# Patient Record
Sex: FEMALE | Race: OTHER | NOT HISPANIC OR LATINO | Employment: OTHER | URBAN - METROPOLITAN AREA
[De-identification: names, ages, dates, MRNs, and addresses within clinical notes are randomized per-mention and may not be internally consistent; named-entity substitution may affect disease eponyms.]

---

## 2017-01-05 ENCOUNTER — CLINICAL SUPPORT (OUTPATIENT)
Dept: REHABILITATION | Facility: HOSPITAL | Age: 67
End: 2017-01-05
Attending: ORTHOPAEDIC SURGERY
Payer: MEDICARE

## 2017-01-05 DIAGNOSIS — M54.2 NECK PAIN ON LEFT SIDE: ICD-10-CM

## 2017-01-05 PROCEDURE — 97112 NEUROMUSCULAR REEDUCATION: CPT | Mod: PO

## 2017-01-05 PROCEDURE — 97140 MANUAL THERAPY 1/> REGIONS: CPT | Mod: PO

## 2017-01-05 NOTE — PROGRESS NOTES
Name: Reuben Almanza  Clinic Number: [id number]  01/05/2017.     Diagnosis: neck pain   Physician: Keaton Harris MD  Treatment Orders: PT Eval and Treat    Past Medical History   Diagnosis Date    Multiple thyroid nodules     Osteoporosis     Reflux     Senile nuclear sclerosis - Both Eyes 10/1/2013     Current Outpatient Prescriptions   Medication Sig    alendronate (FOSAMAX) 70 MG tablet TAKE 1 TABLET BY MOUTH EVERY 7 DAYS    multivitamin (THERAGRAN) per tablet Take 1 tablet by mouth Daily.    ranitidine (ZANTAC) 150 MG tablet Bid prn     No current facility-administered medications for this visit.      Review of patient's allergies indicates:   Allergen Reactions    Ciprofloxacin      palpitations    Flagyl [metronidazole hcl]      Cause urinary frequency     Precautions: standard      Subjective:    Previous PT: none  Work history: clerical, mostly sitting, working at a computer, full time   Recreational activities/exercise program: gardening, walking 30'/day     Reuben reports doing better but has sxs with neck movements.     Pain is rated on a 0-10 scale with 0 being no pain and 10 being pain requiring emergency room visit.    Diagnostic Tests: Radiographs    Patient Goals for PT: reduce pain, get understanding of dx.       Objective:      Visit # 6  Time In: 1400  Time Out: 1445  Treatment time: 30  Date of eval/re-eval: 12/2/2016       Upper c/s rotation deficits R/L       [1] Manual therapy x  15'   C/s distraction   L uni PA with rotation       [1] Neuromuscular re-ed x 15'  Self L uni PA with rotation     Wall slides with reach/serratus activation 3x8          Written HEP Provided: no  Patient education: HEP; avoid sway back and scap DR with ADLs  Reuben demo good understanding of the education provided. Patient demo good return demo of skill of exercises.    Problem List: muscle length impairments, TP, pain, decreased c/s and UE ROM.       Assessment:    Physical therapy diagnosis: scapular  insufficient UR, c/s rotation syndrome  Rehab potential: good  Reuben tolerated tx without increase in sxs. Lower c/s rotation deficts noted. Able to improve with scap repositioning and c/s manual therapy to fix closing restriction on    Reuben will benefit from skilled PT services to address these impairments and progress towards the below listed functional goals.  Reuben is in agreement with the goals that will be addressed in the treatment plan.Reuben demonstrates no additional cultural, spiritual or educational needs and currently has no barriers to learning.      Medical necessity: see Problem List      Functional Goals  Time frame     1.   The pt will demonstrate full c/s rotation without increase in sxs for improved functional mobility.   4-6 weeks     2.   The pt will demonstrate full t/s rotation for improved functional mobility.   4-6 weeks     3.   The pt will neutral alignment of b scapular in normal standing.   4-6 weeks     4.   The pt will be independent in performance and progression of HEP.     2-3 visits            Plan:      Proceed/Continue with POC.     Pt will be treated by physical therapy 1-2x/week during the certification period. Treatment will consist of therapeutic exercise, manual therapy, neuromuscular re-education, heat and cold modalities, electrical stimulation for pain relief and/or muscle activation, and any other types of treatment hat may be deemed medically necessary. Reuben may at times be seen by a PTA as part of the Rehab Team.       Physical Therapist: ZANE Keys, PT, DPT, CSCS    I certify the need for these services furnished under this plan of treatment and while under my care.       ___________________________________  Physician/Referring Practitioner        _________________  Date of Signature

## 2017-01-12 ENCOUNTER — CLINICAL SUPPORT (OUTPATIENT)
Dept: REHABILITATION | Facility: HOSPITAL | Age: 67
End: 2017-01-12
Attending: ORTHOPAEDIC SURGERY
Payer: MEDICARE

## 2017-01-12 DIAGNOSIS — M54.2 NECK PAIN ON LEFT SIDE: ICD-10-CM

## 2017-01-12 PROCEDURE — 97112 NEUROMUSCULAR REEDUCATION: CPT | Mod: PO

## 2017-01-12 PROCEDURE — G8978 MOBILITY CURRENT STATUS: HCPCS | Mod: CJ,PO

## 2017-01-12 PROCEDURE — G8979 MOBILITY GOAL STATUS: HCPCS | Mod: CJ,PO

## 2017-01-12 PROCEDURE — 97140 MANUAL THERAPY 1/> REGIONS: CPT | Mod: PO

## 2017-01-12 NOTE — PROGRESS NOTES
Name: Reuben Almanza  Clinic Number: [id number]  01/12/2017.     Diagnosis: neck pain   Physician: Keaton Harris MD  Treatment Orders: PT Eval and Treat    Past Medical History   Diagnosis Date    Multiple thyroid nodules     Osteoporosis     Reflux     Senile nuclear sclerosis - Both Eyes 10/1/2013     Current Outpatient Prescriptions   Medication Sig    alendronate (FOSAMAX) 70 MG tablet TAKE 1 TABLET BY MOUTH EVERY 7 DAYS    multivitamin (THERAGRAN) per tablet Take 1 tablet by mouth Daily.    ranitidine (ZANTAC) 150 MG tablet Bid prn     No current facility-administered medications for this visit.      Review of patient's allergies indicates:   Allergen Reactions    Ciprofloxacin      palpitations    Flagyl [metronidazole hcl]      Cause urinary frequency     Precautions: standard      Subjective:    Previous PT: none  Work history: clerical, mostly sitting, working at a computer, full time   Recreational activities/exercise program: gardening, walking 30'/day     Reuebn rates self at 90%. All pain sxs have disappears, only complaints are stiffness on L side with R rotation.     Pain is rated on a 0-10 scale with 0 being no pain and 10 being pain requiring emergency room visit.    Diagnostic Tests: Radiographs    Patient Goals for PT: reduce pain, get understanding of dx.       Objective:      Visit # 7  Time In: 1400  Time Out: 1445  Treatment time: 45  Date of eval/re-eval: 1/12/2017       AROM rotation R/L 50E / 60E; post tx R rot 60E  R closing restrictions upper and lower c/s       [2] Manual therapy x  30'  C/s distraction   Suboccipital release   Contract/relax R rotation upper c/s   Capital flexion/distraction   R upper c/s down glides      [1] Neuromuscular re-ed x 15'  Supine chin tucks on exhale 15x   Standing W's with chin tucks multiple sets of 5 with mirror for feedback to decrease R SCM activation     Wall slides with reach/serratus activation 3x8          Written HEP Provided: no  Patient  education: HEP; stop self snag; standing W's with chin tucks 2-3x/day, 10-15 reps  Reuben demo good understanding of the education provided. Patient demo good return demo of skill of exercises.    Problem List: muscle length impairments, TP, pain, decreased c/s and UE ROM.       Assessment:    Physical therapy diagnosis: scapular insufficient UR, c/s rotation syndrome  Rehab potential: good  Reuben tolerated tx without increase in sxs. Doing well overall. Improved c/s rotation with manual therapy techniques. Restricts seem to be reducing, just needs c/s and scap strengthening for improved postural control.  has Reuben will benefit from skilled PT services to address these impairments and progress towards the below listed functional goals.  Reuben is in agreement with the goals that will be addressed in the treatment plan.Reuben demonstrates no additional cultural, spiritual or educational needs and currently has no barriers to learning.      Medical necessity: see Problem List      Functional Goals  Time frame     1.   The pt will demonstrate full c/s rotation without increase in sxs for improved functional mobility. Ongoing; dec pain, stiffness present.   4-6 weeks     2.   The pt will demonstrate full t/s rotation for improved functional mobility. met  4-6 weeks     3.   The pt will neutral alignment of b scapular in normal standing. Deleted   4-6 weeks     4.   The pt will be independent in performance and progression of HEP. Ongoing - new exercise given    2-3 visits            Plan:      Proceed/Continue with POC.     Pt will be treated by physical therapy 1-2x/week during the certification period. Treatment will consist of therapeutic exercise, manual therapy, neuromuscular re-education, heat and cold modalities, electrical stimulation for pain relief and/or muscle activation, and any other types of treatment hat may be deemed medically necessary. Reuben may at times be seen by a PTA as part of the Rehab Team.        Physical Therapist: ZANE Keys, PT, DPT, CSCS    I certify the need for these services furnished under this plan of treatment and while under my care.       ___________________________________  Physician/Referring Practitioner        _________________  Date of Signature

## 2017-01-19 ENCOUNTER — CLINICAL SUPPORT (OUTPATIENT)
Dept: REHABILITATION | Facility: HOSPITAL | Age: 67
End: 2017-01-19
Attending: ORTHOPAEDIC SURGERY
Payer: MEDICARE

## 2017-01-19 DIAGNOSIS — M54.2 NECK PAIN ON LEFT SIDE: ICD-10-CM

## 2017-01-19 PROCEDURE — G8980 MOBILITY D/C STATUS: HCPCS | Mod: CJ,PO

## 2017-01-19 PROCEDURE — 97110 THERAPEUTIC EXERCISES: CPT | Mod: PO

## 2017-01-19 PROCEDURE — G8979 MOBILITY GOAL STATUS: HCPCS | Mod: CJ,PO

## 2017-01-19 PROCEDURE — 97140 MANUAL THERAPY 1/> REGIONS: CPT | Mod: PO

## 2017-01-19 NOTE — PROGRESS NOTES
Name: Reuben Almanza  Clinic Number: [id number]  01/19/2017.     Diagnosis: neck pain   Physician: Keaton Harris MD  Treatment Orders: PT Eval and Treat    Past Medical History   Diagnosis Date    Multiple thyroid nodules     Osteoporosis     Reflux     Senile nuclear sclerosis - Both Eyes 10/1/2013     Current Outpatient Prescriptions   Medication Sig    alendronate (FOSAMAX) 70 MG tablet TAKE 1 TABLET BY MOUTH EVERY 7 DAYS    multivitamin (THERAGRAN) per tablet Take 1 tablet by mouth Daily.    ranitidine (ZANTAC) 150 MG tablet Bid prn     No current facility-administered medications for this visit.      Review of patient's allergies indicates:   Allergen Reactions    Ciprofloxacin      palpitations    Flagyl [metronidazole hcl]      Cause urinary frequency     Precautions: standard      Subjective:    Previous PT: none  Work history: clerical, mostly sitting, working at a computer, full time   Recreational activities/exercise program: gardening, walking 30'/day     Reuben rates self at 90%. All pain sxs have disappears, only complaints are stiffness on L side with R rotation.     Pain is rated on a 0-10 scale with 0 being no pain and 10 being pain requiring emergency room visit.    Diagnostic Tests: Radiographs    Patient Goals for PT: reduce pain, get understanding of dx.       Objective:      Visit # 7  Time In: 1400  Time Out: 1445  Treatment time: 45  Date of eval/re-eval: 1/12/2017       AROM rotation R/L 50E / 60E; post tx R rot 60E  R closing restrictions upper and lower c/s       [1] Manual therapy x  15'  C/s distraction   Upper c/s flexion 4 x 6   Upper c/s flexion hold with R SB     [1] Neuromuscular re-ed x 15'  Supine chin tucks on exhale 10x  Seated chin tucks with R sidebend 10x      Wall slides with reach/serratus activation 3x8          Written HEP Provided: no  Patient education: HEP; stop self snag; standing W's with chin tucks 2-3x/day, 10-15 reps  Reuben torres good understanding of the  education provided. Patient demo good return demo of skill of exercises.    Problem List: muscle length impairments, TP, pain, decreased c/s and UE ROM.       Assessment:    Physical therapy diagnosis: scapular insufficient UR, c/s rotation syndrome  Rehab potential: good  Reuben tolerated tx without increase in sxs. Continue to work on L side opening restriction. Gave HEP to work on for 2 weeks.Has met functional goals.   Reuben will benefit from skilled PT services to address these impairments and progress towards the below listed functional goals.  Reuben is in agreement with the goals that will be addressed in the treatment plan.Reuben demonstrates no additional cultural, spiritual or educational needs and currently has no barriers to learning.      Medical necessity: see Problem List      Functional Goals  Time frame     1.   The pt will demonstrate full c/s rotation without increase in sxs for improved functional mobility. MET; dec pain, stiffness present.   4-6 weeks     2.   The pt will demonstrate full t/s rotation for improved functional mobility. met  4-6 weeks     3.   The pt will neutral alignment of b scapular in normal standing. Deleted   4-6 weeks     4.   The pt will be independent in performance and progression of HEP. Ongoing - new exercise given    2-3 visits            Plan:      DC; f/u in 2 weeks if necessary.     Pt will be treated by physical therapy 1-2x/week during the certification period. Treatment will consist of therapeutic exercise, manual therapy, neuromuscular re-education, heat and cold modalities, electrical stimulation for pain relief and/or muscle activation, and any other types of treatment hat may be deemed medically necessary. Reuben may at times be seen by a PTA as part of the Rehab Team.       Physical Therapist: ZANE Keys, PT, DPT, CSCS    I certify the need for these services furnished under this plan of treatment and while under my care.        ___________________________________  Physician/Referring Practitioner        _________________  Date of Signature

## 2017-01-20 ENCOUNTER — TELEPHONE (OUTPATIENT)
Dept: ORTHOPEDICS | Facility: CLINIC | Age: 67
End: 2017-01-20

## 2017-01-20 DIAGNOSIS — R52 PAIN: Primary | ICD-10-CM

## 2017-01-25 ENCOUNTER — TELEPHONE (OUTPATIENT)
Dept: ORTHOPEDICS | Facility: CLINIC | Age: 67
End: 2017-01-25

## 2017-01-26 ENCOUNTER — OFFICE VISIT (OUTPATIENT)
Dept: ORTHOPEDICS | Facility: CLINIC | Age: 67
End: 2017-01-26
Payer: MEDICARE

## 2017-01-26 ENCOUNTER — HOSPITAL ENCOUNTER (OUTPATIENT)
Dept: RADIOLOGY | Facility: OTHER | Age: 67
Discharge: HOME | End: 2017-01-26
Attending: ORTHOPAEDIC SURGERY
Payer: MEDICARE

## 2017-01-26 VITALS
HEIGHT: 64 IN | DIASTOLIC BLOOD PRESSURE: 68 MMHG | HEART RATE: 72 BPM | SYSTOLIC BLOOD PRESSURE: 104 MMHG | WEIGHT: 101.44 LBS | BODY MASS INDEX: 17.32 KG/M2 | RESPIRATION RATE: 18 BRPM

## 2017-01-26 DIAGNOSIS — R52 PAIN: ICD-10-CM

## 2017-01-26 DIAGNOSIS — M79.643 PAIN OF HAND, UNSPECIFIED LATERALITY: Primary | ICD-10-CM

## 2017-01-26 DIAGNOSIS — R52 PAIN: Primary | ICD-10-CM

## 2017-01-26 PROCEDURE — 99999 PR PBB SHADOW E&M-EST. PATIENT-LVL III: CPT | Mod: PBBFAC,,, | Performed by: ORTHOPAEDIC SURGERY

## 2017-01-26 PROCEDURE — 1159F MED LIST DOCD IN RCRD: CPT | Mod: S$GLB,,, | Performed by: ORTHOPAEDIC SURGERY

## 2017-01-26 PROCEDURE — 1126F AMNT PAIN NOTED NONE PRSNT: CPT | Mod: S$GLB,,, | Performed by: ORTHOPAEDIC SURGERY

## 2017-01-26 PROCEDURE — 99204 OFFICE O/P NEW MOD 45 MIN: CPT | Mod: S$GLB,,, | Performed by: ORTHOPAEDIC SURGERY

## 2017-01-26 PROCEDURE — 1157F ADVNC CARE PLAN IN RCRD: CPT | Mod: S$GLB,,, | Performed by: ORTHOPAEDIC SURGERY

## 2017-01-26 PROCEDURE — 73130 X-RAY EXAM OF HAND: CPT | Mod: 26,LT,, | Performed by: RADIOLOGY

## 2017-01-26 PROCEDURE — 1160F RVW MEDS BY RX/DR IN RCRD: CPT | Mod: S$GLB,,, | Performed by: ORTHOPAEDIC SURGERY

## 2017-01-26 NOTE — PROGRESS NOTES
Chief Complaint: left hand    History of Present Illness:  Marshall Almanza is a very pleasant 66 y.o. female who presents with left thumb pain and triggering x several; months.  She had a left thumb trigger finger injection in 10/2016 with good relief of her pain but not of her triggering sxs.  She also reports a 1 week hx of index finger numbness, worse at night, relieved by topical icy-hot gel.  REports minimal but occasional right hand ulnar 2 digits numbness, no weakness.    Review of Systems:    Noncontributory except for above      Past Medical History   Diagnosis Date    Multiple thyroid nodules     Osteoporosis     Reflux     Senile nuclear sclerosis - Both Eyes 10/1/2013       Past Surgical History:   No past surgical history on file.    Social History:  negative tobacco abuse    Allergies:  Review of patient's allergies indicates:   Allergen Reactions    Ciprofloxacin      palpitations    Flagyl [metronidazole hcl]      Cause urinary frequency       Medications:  Current Outpatient Prescriptions   Medication Sig Dispense Refill    alendronate (FOSAMAX) 70 MG tablet TAKE 1 TABLET BY MOUTH EVERY 7 DAYS 12 tablet 3    multivitamin (THERAGRAN) per tablet Take 1 tablet by mouth Daily.      ranitidine (ZANTAC) 150 MG tablet Bid prn 180 tablet 3     No current facility-administered medications for this visit.        Physical Exam:   General:  Well developed and well nourished age appropriate female in no acute distress  Cardiovascular: regular rate and rhythm, 2+ radial pulse BL  Respiratory:  no wheezing    Musculoskeletal: + ttp over thumb A1, painful passive ROM  + phalen's compression at wrist, - tinnel's  Median, AIN, Radial, PIN and ulnar intact with 5/5 motor BL  - tinnel's left  - tinnel's at left cubital tunnel   SILT BL hands throughout  Neuro: as above    Imaging:  Imaging revealed: no fracture, no dislocation    Diagnosis:  66 year old woman with LEFT  trigger thumb s/p 1 injection with no  relief also with signs and sxs of LEFT CTS.  Adamantly does not want surgery at this time.    Plan:   1. EMG BL UE  2. OT   3. F/u after for possible CTR and left trigger thumb release

## 2017-01-26 NOTE — LETTER
January 30, 2017      Michelle Bravo MD  [address] [facility]  [address]  [place] [address]           [facility]  [address]  [address]  [place] [address]  Phone: [phone]          Patient: Marshall Almanza   MR Number: [id number]   YOB: 1950   Date of Visit: 1/26/2017       Dear Dr. Michelle Bravo:    Thank you for referring Marshall Almanza to me for evaluation. Attached you will find relevant portions of my assessment and plan of care.    If you have questions, please do not hesitate to call me. I look forward to following Marshall Almanza along with you.    Sincerely,    Ladonna Cespedes MD    Enclosure  CC:  No Recipients    If you would like to receive this communication electronically, please contact [email or web address] or [phone] to request more information on [employer] Link access.    For providers and/or their staff who would like to refer a patient to [facility], please contact us through our one-stop-shop provider referral line, Clinic , at [phone].    If you feel you have received this communication in error or would no longer like to receive these types of communications, please e-mail [email or web address]

## 2017-01-30 NOTE — PROGRESS NOTES
I have personally taken the history and examined this patient. I agree with the resident's note as stated above. Plan for EMG/ NCS. When pt f/u will discuss again trigger finger surgery.

## 2017-02-15 ENCOUNTER — CLINICAL SUPPORT (OUTPATIENT)
Dept: REHABILITATION | Facility: HOSPITAL | Age: 67
End: 2017-02-15
Attending: ORTHOPAEDIC SURGERY
Payer: MEDICARE

## 2017-02-15 DIAGNOSIS — M79.645 FINGER PAIN, LEFT: ICD-10-CM

## 2017-02-15 PROCEDURE — 97165 OT EVAL LOW COMPLEX 30 MIN: CPT | Mod: PO

## 2017-02-15 PROCEDURE — 97018 PARAFFIN BATH THERAPY: CPT | Mod: PO

## 2017-02-15 PROCEDURE — G8990 OTHER PT/OT CURRENT STATUS: HCPCS | Mod: CJ,PO

## 2017-02-15 PROCEDURE — G8991 OTHER PT/OT GOAL STATUS: HCPCS | Mod: CI,PO

## 2017-02-15 NOTE — PLAN OF CARE
"Occupational Therapy Hand/ Wrist  Evaluation    Patient: Marshall Almanza  Date of Evaluation: 2/15/2017  MRN: [id number]    Diagnosis:   Encounter Diagnosis   Name Primary?    Finger pain, left      Physician: Tim Barajas MD  Treatment Orders: Eval and treat     Past Medical History   Diagnosis Date    Multiple thyroid nodules     Osteoporosis     Reflux     Senile nuclear sclerosis - Both Eyes 10/1/2013     Current Outpatient Prescriptions   Medication Sig    alendronate (FOSAMAX) 70 MG tablet TAKE 1 TABLET BY MOUTH EVERY 7 DAYS    multivitamin (THERAGRAN) per tablet Take 1 tablet by mouth Daily.    ranitidine (ZANTAC) 150 MG tablet Bid prn     No current facility-administered medications for this visit.      Review of patient's allergies indicates:   Allergen Reactions    Ciprofloxacin      palpitations    Flagyl [metronidazole hcl]      Cause urinary frequency     Precautions: N/A         Age: 66 y.o.  Sex: female  Hand dominance: Right      Occupation:  Administration  Working presently:  yes       Date of onset: trigger thumb since 2016; CTS- about a month   Involved area:  Left hand   Mechanism of Injury:   insidious onset    Environmental Concerns/Fall Risk: None  Language: None  Cultural/Spiritual: None  Barriers to Learning: None   Abuse/Neglect/Nutritional: None  Medications:  See med card   Allergies:  See allergy card   X-Rays/Tests:  See Epic    Subjective:  Pt reports , " I have numbness at times. It happens randomly..     Pain :  At rest: 0/10  With work/ Activity: 5/10  Sleepin/10  Location of Pain : volar thumb     Patients goals for therapy are:   To  be able to use hand again     Objective:     Sensation:  Numbness in index finger  Scar / Wound: N/A  Edema:  N/A        Range of Motion: AROM extension/flexion                    MP: wnl  PIP: wnl  DIP:wnl                                    AROM Thumb exten/flex  MP:41  IP:45      AROM:  Thumb opposition: thumb to index finger   " "                                                         Strength: (TRINI Dynamometer in lbs.), Position II  (R): 15  (L): 8     Pinch Strength: did not test as she is triggering in clinic with thumb IP flexion measurements        Treatment included OT evaluation, the following exercises (HEP) were instructed and pt was able to demonstrate them prior to the end of the session. HEP are as follows: fabricated orthosis for trigger thumb , Patient received paraffin bath to left hand  hand(s) for 8 minutes to increase blood flow and reduce stiffness and tlked about purchasing for home use. Supplied dycem for jars,   Gave info about paraffin machine , supplied CTS nerve glides for left hand and for right hand yellow putty with exercises.           Assessment:   Problem List :       Decreased ROM,  Scar formation  Decreased , opening jars  Decreased muscle strength,   Decreased functional hand use,  Increased pain,   Edema      History Examination Decision Making Complexity Score   Occupational Profile:   Pt's full occupational profile reviewed and provided, including OD(surgical notes), including report of previous therapies.    Medical and Therapy History:     Please see "Plan" section for reference of therapy goals.    Pt with Hx/olow    Brief/expanded review:  low             Performance Deficits     Physical    Please see under "problem list" and the assessment portion of this eval note    (Biomechanical)  Body systems  -musculoskeletal: ROM,strength,FM and GM deficits,        .    Rating: low Treatment to include :  paraffin, fluidotherapy, manual therapy/joint mobilizations,scar massage,   modalities for pain management, iontophoresis with dexamethasone, US 3mhz, therapeutic exercises/activities,        strengthening,       Clinical decision  Making of low complexity, low modifications for required to complete eval      Rating:low low in combination of the 3 categories          G CODE TOOL: FOTO    Category: " Self Care      Current Score  =  40 %  impaired  Goal at Discharge Score =  20% impaired    Score interpretation is as follows:     TEST SCORE  Modifier  Impairment Limitation Restriction    0%  CH  0 % impaired, limited or restricted    1-19%  CI  @ least 1% but less than 20% impaired, limited or restricted    20-39%  CJ  @ least 20%<40% impaired, limited or restricted    40-59%  CK  @ least 40%<60% impaired, limited or restricted    60-79%  CL  @ least 60% <80% impaired, limited or restricted    80-99%  CM  @ least 80%<100% impaired limited or restricted    100%  CN  100% impaired, limited or restricted         Goals to be met :  1) Patient to be IND with HEP and modalities for pain management by 6-8 weeks.  2)  Pt. will increase ROM 3-5 degrees to increase ,maipulating small objectsfor ADLs by 6-8 weeks.  3)   Pt. will increase  strength 3-5 lbs. to open jars by 6-8 weeks.  4) Pt will score FOTO score of 20% less than current score and obtain goal score.       Plan:   Patient to be treated by Occupational Therapy    1-2    times per week for  60 days   during the certification period from   2-15-17  to 4-15-17   to achieve the established goals.  Treatment to include :  paraffin, fluidotherapy, manual therapy/joint mobilizations,  modalities for pain management, iontophoresis with dexamethasone, US 3mhz, therapeutic exercises/activities,   strengthening,  as well as any other treatments deemed necessary based on the patient's needs or progress.                 I certify the need for these services furnished under this plan of treatment and while under my care    ____________________________________                         __________________  Physician/Referring Practitioner                                               Date of Signature

## 2017-02-15 NOTE — PROGRESS NOTES
"Occupational Therapy Hand/ Wrist  Evaluation    Patient: Marshall Almanza  Date of Evaluation: 2/15/2017  MRN: [id number]    Diagnosis:   Encounter Diagnosis   Name Primary?    Finger pain, left      Physician: Tim Barajas MD  Treatment Orders: Eval and treat     Past Medical History   Diagnosis Date    Multiple thyroid nodules     Osteoporosis     Reflux     Senile nuclear sclerosis - Both Eyes 10/1/2013     Current Outpatient Prescriptions   Medication Sig    alendronate (FOSAMAX) 70 MG tablet TAKE 1 TABLET BY MOUTH EVERY 7 DAYS    multivitamin (THERAGRAN) per tablet Take 1 tablet by mouth Daily.    ranitidine (ZANTAC) 150 MG tablet Bid prn     No current facility-administered medications for this visit.      Review of patient's allergies indicates:   Allergen Reactions    Ciprofloxacin      palpitations    Flagyl [metronidazole hcl]      Cause urinary frequency     Precautions: N/A         Age: 66 y.o.  Sex: female  Hand dominance: Right      Occupation:  Administration  Working presently:  yes       Date of onset: trigger thumb since 2016; CTS- about a month   Involved area:  Left hand   Mechanism of Injury:   insidious onset    Environmental Concerns/Fall Risk: None  Language: None  Cultural/Spiritual: None  Barriers to Learning: None   Abuse/Neglect/Nutritional: None  Medications:  See med card   Allergies:  See allergy card   X-Rays/Tests:  See Epic    Subjective:  Pt reports , " I have numbness at times. It happens randomly..     Pain :  At rest: 0/10  With work/ Activity: 5/10  Sleepin/10  Location of Pain : volar thumb     Patients goals for therapy are:   To  be able to use hand again     Objective:     Sensation:  Numbness in index finger  Scar / Wound: N/A  Edema:  N/A        Range of Motion: AROM extension/flexion                    MP: wnl  PIP: wnl  DIP:wnl                                    AROM Thumb exten/flex  MP:41  IP:45      AROM:  Thumb opposition: thumb to index finger   " "                                                         Strength: (TRINI Dynamometer in lbs.), Position II  (R): 15  (L): 8     Pinch Strength: did not test as she is triggering in clinic with thumb IP flexion measurements        Treatment included OT evaluation, the following exercises (HEP) were instructed and pt was able to demonstrate them prior to the end of the session. HEP are as follows: fabricated orthosis for trigger thumb , Patient received paraffin bath to left hand  hand(s) for 8 minutes to increase blood flow and reduce stiffness and tlked about purchasing for home use. Supplied dycem for jars,   Gave info about paraffin machine , supplied CTS nerve glides for left hand and for right hand yellow putty with exercises.           Assessment:   Problem List :       Decreased ROM,  Scar formation  Decreased , opening jars  Decreased muscle strength,   Decreased functional hand use,  Increased pain,   Edema      History Examination Decision Making Complexity Score   Occupational Profile:   Pt's full occupational profile reviewed and provided, including OD(surgical notes), including report of previous therapies.    Medical and Therapy History:     Please see "Plan" section for reference of therapy goals.    Pt with Hx/olow    Brief/expanded review:  low             Performance Deficits     Physical    Please see under "problem list" and the assessment portion of this eval note    (Biomechanical)  Body systems  -musculoskeletal: ROM,strength,FM and GM deficits,        .    Rating: low Treatment to include :  paraffin, fluidotherapy, manual therapy/joint mobilizations,scar massage,   modalities for pain management, iontophoresis with dexamethasone, US 3mhz, therapeutic exercises/activities,        strengthening,       Clinical decision  Making of low complexity, low modifications for required to complete eval      Rating:low low in combination of the 3 categories          G CODE TOOL: FOTO    Category: " Self Care      Current Score  =  40 %  impaired  Goal at Discharge Score =  30% impaired    Score interpretation is as follows:     TEST SCORE  Modifier  Impairment Limitation Restriction    0%  CH  0 % impaired, limited or restricted    1-19%  CI  @ least 1% but less than 20% impaired, limited or restricted    20-39%  CJ  @ least 20%<40% impaired, limited or restricted    40-59%  CK  @ least 40%<60% impaired, limited or restricted    60-79%  CL  @ least 60% <80% impaired, limited or restricted    80-99%  CM  @ least 80%<100% impaired limited or restricted    100%  CN  100% impaired, limited or restricted         Goals to be met :  1) Patient to be IND with HEP and modalities for pain management by 6-8 weeks.  2)  Pt. will increase ROM 3-5 degrees to increase ,maipulating small objectsfor ADLs by 6-8 weeks.  3)   Pt. will increase  strength 3-5 lbs. to open jars by 6-8 weeks.  4) Pt will score FOTO score of 20% less than current score and obtain goal score.       Plan:   Patient to be treated by Occupational Therapy    1-2    times per week for  60 days   during the certification period from   2-15-17  to 4-15-17   to achieve the established goals.  Treatment to include :  paraffin, fluidotherapy, manual therapy/joint mobilizations,  modalities for pain management, iontophoresis with dexamethasone, US 3mhz, therapeutic exercises/activities,   strengthening,  as well as any other treatments deemed necessary based on the patient's needs or progress.                 I certify the need for these services furnished under this plan of treatment and while under my care    ____________________________________                         __________________  Physician/Referring Practitioner                                               Date of Signature

## 2017-03-01 ENCOUNTER — CLINICAL SUPPORT (OUTPATIENT)
Dept: REHABILITATION | Facility: HOSPITAL | Age: 67
End: 2017-03-01
Attending: ORTHOPAEDIC SURGERY
Payer: MEDICARE

## 2017-03-01 DIAGNOSIS — M79.645 FINGER PAIN, LEFT: ICD-10-CM

## 2017-03-01 PROCEDURE — 97022 WHIRLPOOL THERAPY: CPT | Mod: PO,59

## 2017-03-01 PROCEDURE — 97035 APP MDLTY 1+ULTRASOUND EA 15: CPT | Mod: PO

## 2017-03-01 PROCEDURE — 97018 PARAFFIN BATH THERAPY: CPT | Mod: PO

## 2017-03-01 NOTE — PROGRESS NOTES
"Occupational Therapy Progress Note    Patient: Marshall Almanza  OT Diagnosis: Trigger Finger  And OA of fingers  Date of treatment: 03/01/2017    Total Treatment time: 60  Group Time: 0    Time in:1  Time out:2    Subjective: Pt reported, " The exercises have been doing good for my hand."     Pain upon arrival:  0/10  Pain post session: 0/10    Objective:   Pt participated in OT on this date for  Patient received paraffin bath to leftt hand(s) for 8 minutes to increase blood flow, circulation, pain management and for tissue elasticity prior to therex. Patient received ultrasound to  Left hand area to increase blood flow, circulation, tissue elasticity, pain management and for wound/scar management for 8 minutes @ 3.3 Mhz, Intensity 0.5 w/cm2 at 20% duty cycle. Patient received fluidotherapy to 20 hand(s) for left hand to increase blood flow for gripping x 2 minutes, thumb extension x 1 minutes, yellow power web x 2 minutes in extension only, renee ABD of thumb x 30 reps, red clothespin x 2 minutes for 3 jaw,  Then with right hand for fludiotherapy gripping , then power web x 3 minutes        Treatment:  Therex, paraffin, fludiotherapy, US      Assessment:     The patient demonstrated proper understanding  of each exercise.Pt required verbal and tactile cues for all exercises today.  Pt  will continue to benefit from skilled OT intervention. Pt continues to be limited in functional and leisurely pursuits. Pain limits patients participation in ADL's. Pt is not able to carryout necessary vocational tasks. Patient continues to requires cues and skilled supervision to complete HEP       New/Revised Goals:  continue with POC  Plan:  Continue 1-2x week x 6-8  weeks during the certification period from 2-15-17  to 4-15-17  in pursuit of OT goals.      "

## 2017-04-03 ENCOUNTER — PROCEDURE VISIT (OUTPATIENT)
Dept: NEUROLOGY | Facility: CLINIC | Age: 67
End: 2017-04-03
Payer: MEDICARE

## 2017-04-03 DIAGNOSIS — M79.643 PAIN OF HAND, UNSPECIFIED LATERALITY: ICD-10-CM

## 2017-04-03 PROCEDURE — 95886 MUSC TEST DONE W/N TEST COMP: CPT | Mod: S$GLB,,, | Performed by: PSYCHIATRY & NEUROLOGY

## 2017-04-03 PROCEDURE — 95910 NRV CNDJ TEST 7-8 STUDIES: CPT | Mod: S$GLB,,, | Performed by: PSYCHIATRY & NEUROLOGY

## 2017-04-10 ENCOUNTER — TELEPHONE (OUTPATIENT)
Dept: ORTHOPEDICS | Facility: CLINIC | Age: 67
End: 2017-04-10

## 2017-04-10 ENCOUNTER — HOSPITAL ENCOUNTER (OUTPATIENT)
Dept: RADIOLOGY | Facility: HOSPITAL | Age: 67
Discharge: HOME | End: 2017-04-10
Attending: ORTHOPAEDIC SURGERY
Payer: MEDICARE

## 2017-04-10 DIAGNOSIS — R52 PAIN: ICD-10-CM

## 2017-04-10 PROCEDURE — 73130 X-RAY EXAM OF HAND: CPT | Mod: TC,PO,LT

## 2017-04-10 PROCEDURE — 73130 X-RAY EXAM OF HAND: CPT | Mod: 26,LT,, | Performed by: RADIOLOGY

## 2017-04-10 NOTE — TELEPHONE ENCOUNTER
Marshall Almanza reminded of appointment on 4/11/17 with Dr. MI Cespedes w/time and location. Notified of need for xray before OV w/date, time, and location of appts.  Xray scheduled per pt request today at [facility].

## 2017-04-18 ENCOUNTER — OFFICE VISIT (OUTPATIENT)
Dept: ORTHOPEDICS | Facility: CLINIC | Age: 67
End: 2017-04-18
Payer: MEDICARE

## 2017-04-18 VITALS
HEART RATE: 76 BPM | WEIGHT: 101.44 LBS | BODY MASS INDEX: 17.32 KG/M2 | DIASTOLIC BLOOD PRESSURE: 67 MMHG | SYSTOLIC BLOOD PRESSURE: 100 MMHG | HEIGHT: 64 IN

## 2017-04-18 DIAGNOSIS — G56.01 CARPAL TUNNEL SYNDROME OF RIGHT WRIST: Primary | ICD-10-CM

## 2017-04-18 DIAGNOSIS — M19.049 HAND ARTHRITIS: ICD-10-CM

## 2017-04-18 PROCEDURE — 99213 OFFICE O/P EST LOW 20 MIN: CPT | Mod: S$GLB,,, | Performed by: ORTHOPAEDIC SURGERY

## 2017-04-18 PROCEDURE — 1126F AMNT PAIN NOTED NONE PRSNT: CPT | Mod: S$GLB,,, | Performed by: ORTHOPAEDIC SURGERY

## 2017-04-18 PROCEDURE — 99999 PR PBB SHADOW E&M-EST. PATIENT-LVL III: CPT | Mod: PBBFAC,,, | Performed by: ORTHOPAEDIC SURGERY

## 2017-04-18 PROCEDURE — 1159F MED LIST DOCD IN RCRD: CPT | Mod: S$GLB,,, | Performed by: ORTHOPAEDIC SURGERY

## 2017-04-18 PROCEDURE — 1160F RVW MEDS BY RX/DR IN RCRD: CPT | Mod: S$GLB,,, | Performed by: ORTHOPAEDIC SURGERY

## 2017-04-18 NOTE — MR AVS SNAPSHOT
"    [facility]  [address], [address]  [facility] [address]  Phone: [phone]                  Marshall Almanza   2017 8:00 AM   Office Visit    Description:  Female : 1950   Provider:  Ladonna Cespedes MD   Department:  [facility]           Reason for Visit     Left Hand - Numbness           Diagnoses this Visit        Comments    Carpal tunnel syndrome of right wrist    -  Primary     Hand arthritis                To Do List           Goals (5 Years of Data)     None      [facility]     [facility] Nurse Care Line -  Assistance  Unless otherwise directed by your provider, please contact [facility], our nurse care line that is available for  assistance.     Registered nurses in the [facility] Center provide: appointment scheduling, clinical advisement, health education, and other advisory services.  Call: [phone] (toll free)               Medications           Message regarding Medications     Verify the changes and/or additions to your medication regime listed below are the same as discussed with your clinician today.  If any of these changes or additions are incorrect, please notify your healthcare provider.             Verify that the below list of medications is an accurate representation of the medications you are currently taking.  If none reported, the list may be blank. If incorrect, please contact your healthcare provider. Carry this list with you in case of emergency.           Current Medications     alendronate (FOSAMAX) 70 MG tablet TAKE 1 TABLET BY MOUTH EVERY 7 DAYS    multivitamin (THERAGRAN) per tablet Take 1 tablet by mouth Daily.    ranitidine (ZANTAC) 150 MG tablet Bid prn           Clinical Reference Information           Your Vitals Were     BP Pulse Height Weight BMI    100/67 (BP Location: Left arm) 76 5' 4" (1.626 m) 46 kg (101 lb 6.6 oz) 17.41 kg/m2      Blood Pressure          Most Recent Value    BP  " 100/67      Allergies as of 4/18/2017     Ciprofloxacin    Flagyl [Metronidazole Hcl]      Immunizations Administered on Date of Encounter - 4/18/2017     None      Orders Placed During Today's Visit      Normal Orders This Visit    Ambulatory Referral to Physical/Occupational Therapy       Language Assistance Services     ATTENTION: Language assistance services are available, free of charge. Please call [phone].      ATENCIÓN: Si habla español, tiene a bill disposición servicios gratuitos de asistencia lingüística. Llame al [phone].     KASSIDY Ý: N?u b?n nói Ti?ng Vi?t, có các d?ch v? h? tr? ngôn ng? mi?n phí dành cho b?n. G?i s? [phone].         [facility] complies with applicable Federal civil rights laws and does not discriminate on the basis of race, color, national origin, age, disability, or sex.

## 2017-05-09 ENCOUNTER — CLINICAL SUPPORT (OUTPATIENT)
Dept: REHABILITATION | Facility: HOSPITAL | Age: 67
End: 2017-05-09
Attending: ORTHOPAEDIC SURGERY
Payer: MEDICARE

## 2017-05-09 DIAGNOSIS — M79.645 FINGER PAIN, LEFT: ICD-10-CM

## 2017-05-09 DIAGNOSIS — G56.01 CARPAL TUNNEL SYNDROME OF RIGHT WRIST: ICD-10-CM

## 2017-05-09 PROBLEM — G56.00 CARPAL TUNNEL SYNDROME: Status: ACTIVE | Noted: 2017-05-09

## 2017-05-09 PROCEDURE — G8990 OTHER PT/OT CURRENT STATUS: HCPCS | Mod: CJ,PO

## 2017-05-09 PROCEDURE — 97165 OT EVAL LOW COMPLEX 30 MIN: CPT | Mod: PO

## 2017-05-09 PROCEDURE — G8991 OTHER PT/OT GOAL STATUS: HCPCS | Mod: CI,PO

## 2017-05-09 PROCEDURE — 97018 PARAFFIN BATH THERAPY: CPT | Mod: PO

## 2017-05-09 NOTE — PLAN OF CARE
Occupational Therapy Hand/ Wrist  Evaluation    Patient: Marshall Almanza  Date of Evaluation: 5/9/2017  MRN: [id number]    Diagnosis:   Encounter Diagnoses   Name Primary?    Finger pain, left     Carpal tunnel syndrome of right wrist      Physician: Ladonna Cespedes, *  Treatment Orders: Eval and treat     Past Medical History:   Diagnosis Date    Multiple thyroid nodules     Osteoporosis     Reflux     Senile nuclear sclerosis - Both Eyes 10/1/2013     Current Outpatient Prescriptions   Medication Sig    alendronate (FOSAMAX) 70 MG tablet TAKE 1 TABLET BY MOUTH EVERY 7 DAYS    multivitamin (THERAGRAN) per tablet Take 1 tablet by mouth Daily.    ranitidine (ZANTAC) 150 MG tablet Bid prn     No current facility-administered medications for this visit.      Review of patient's allergies indicates:   Allergen Reactions    Ciprofloxacin      palpitations    Flagyl [metronidazole hcl]      Cause urinary frequency     Precautions: Osteoporosis--fracture risk with falls     Age: 67 y.o.  Sex: female  Hand dominance: Right    See Eval    Subjective:  Pt reports   Pt is wearing braces at night.    Pain :  At rest:  0/10  With work/ Activity: 3/10  Sleeping: 3/10  Location of Pain  No pain     Patients goals for therapy are:   To  be able to use hand again     Objective:     Sensation:  No numbness or tingling; [place] ronni is 2.83  Scar / Wound: N/A   Edema:  N/A         Range of Motion: AROM extension/flexion                    MP: wnl  PIP: wnl  DIP:wnl                                     Strength: (TRINI Dynamometer in lbs.), Position II  (R): 45  (L): 54     Pinch Strength: (Pinch Gauge in psis),       (R) /  (L)  LAT:       9 / 5  3PT:      4 / 4    Treatment included OT evaluation, the following exercises (HEP) were instructed and pt was able to demonstrate them prior to the end of the session. HEP are as follows: median nerve glides 15 cycles 4x/day; MP/PIP/DIP isolated blocking flexion 15  "reps ea for 4x/day; fisted flex/ext 15 reps 4x/day; tendon glides 15 reps 4x/day; contrast baths as needed; wear wrist immobilizer during sleep; can wear wrist immobilizer during strenuous activities.  Patient received paraffin bath to bilateral hand(s) for 8 minutes to increase blood flow, circulation, pain management and for tissue elasticity prior to therex. Patient received paraffin bath to left  hand(s) for 8 minutes to increase blood flow, circulation, pain management and for tissue elasticity prior to therex.     Assessment:   Problem List :   Decreased  and pinch strength,   Decreased muscle strength,   Decreased functional hand use    History Examination Decision Making Complexity Score   Occupational Profile:   Pt's full occupational profile reviewed and provided, including OD(surgical notes), including report of previous therapies.    Medical and Therapy History:     Please see "Plan" section for reference of therapy goals.    Pt with Hx/o multiple thyroid nodules, osteoporosis, reflux, senile nuclear sclerosis bilateral, left cmc pain/arthritis, right carpal tunnel syndrome    Brief review:   right carpal tunnel syndrome           Performance Deficits     Physical    Please see under "problem list" and the assessment portion of this eval note    (Biomechanical)  Body systems  -musculoskeletal: ROM,strength,FM and GM deficits,    Integumentary: intact  .    Rating: low Treatment to include :  paraffin, fluidotherapy, manual therapy/joint mobilizations,scar massage,   modalities for pain management, iontophoresis with dexamethasone, US 3mhz, therapeutic exercises/activities,        strengthening,       Clinical decision  Making of low complexity, no modifications for required to complete eval      Rating:low Low in combination of the 3 categories      G CODE TOOL: FOTO    Category: Self Care      Current Score  =  31%  impaired  Goal at Discharge Score =  15% impaired    Score interpretation is as " follows:   TEST SCORE  Modifier  Impairment Limitation Restriction    0%  CH  0 % impaired, limited or restricted    1-19%  CI  @ least 1% but less than 20% impaired, limited or restricted    20-39%  CJ  @ least 20%<40% impaired, limited or restricted    40-59%  CK  @ least 40%<60% impaired, limited or restricted    60-79%  CL  @ least 60% <80% impaired, limited or restricted    80-99%  CM  @ least 80%<100% impaired limited or restricted    100%  CN  100% impaired, limited or restricted         Goals to be met :  1) Patient to be IND with HEP and modalities for pain management by 6-8 weeks.  2)   Pt. will increase  strength 3-5 lbs. to grasp cup by 6-8 weeks.  3)   Pt. will increase pinch 1-3 psis for buttoning by 6-8 weeks.   4) Pt will score FOTO score of 16% less than current score and obtain goal score.     Plan:   Patient to be treated by Occupational Therapy    1-2    times per week for  60 days   during the certification period from   5-09-17  to 7-09-17     to achieve the established goals.  Treatment to include :  paraffin, fluidotherapy, manual therapy/joint mobilizations,  modalities for pain management, iontophoresis with dexamethasone, US 3mhz, therapeutic exercises/activities,        strengthening,    as well as any other treatments deemed necessary based on the patient's needs or progress.                   I certify the need for these services furnished under this plan of treatment and while under my care    ____________________________________                         __________________  Physician/Referring Practitioner                                               Date of Signature

## 2017-05-09 NOTE — PROGRESS NOTES
Occupational Therapy Hand/ Wrist  Evaluation    Patient: Marshall Almanza  Date of Evaluation: 5/9/2017  MRN: [id number]    Diagnosis:   Encounter Diagnoses   Name Primary?    Finger pain, left     Carpal tunnel syndrome of right wrist      Physician: Ladonna Cespedes, *  Treatment Orders: Eval and treat     Past Medical History:   Diagnosis Date    Multiple thyroid nodules     Osteoporosis     Reflux     Senile nuclear sclerosis - Both Eyes 10/1/2013     Current Outpatient Prescriptions   Medication Sig    alendronate (FOSAMAX) 70 MG tablet TAKE 1 TABLET BY MOUTH EVERY 7 DAYS    multivitamin (THERAGRAN) per tablet Take 1 tablet by mouth Daily.    ranitidine (ZANTAC) 150 MG tablet Bid prn     No current facility-administered medications for this visit.      Review of patient's allergies indicates:   Allergen Reactions    Ciprofloxacin      palpitations    Flagyl [metronidazole hcl]      Cause urinary frequency     Precautions: Osteoporosis--fracture risk with falls     Age: 67 y.o.  Sex: female  Hand dominance: Right    See Eval    Subjective:  Pt reports   Pt is wearing braces at night.    Pain :  At rest:  0/10  With work/ Activity: 3/10  Sleeping: 3/10  Location of Pain  No pain     Patients goals for therapy are:   To  be able to use hand again     Objective:     Sensation:  No numbness or tingling; [place] ronni is 2.83  Scar / Wound: N/A   Edema:  N/A         Range of Motion: AROM extension/flexion                    MP: wnl  PIP: wnl  DIP:wnl                                     Strength: (TRINI Dynamometer in lbs.), Position II  (R): 45  (L): 54     Pinch Strength: (Pinch Gauge in psis),       (R) /  (L)  LAT:       9 / 5  3PT:      4 / 4    Treatment included OT evaluation, the following exercises (HEP) were instructed and pt was able to demonstrate them prior to the end of the session. HEP are as follows: median nerve glides 15 cycles 4x/day; MP/PIP/DIP isolated blocking flexion 15  "reps ea for 4x/day; fisted flex/ext 15 reps 4x/day; tendon glides 15 reps 4x/day; contrast baths as needed; wear wrist immobilizer during sleep; can wear wrist immobilizer during strenuous activities.  Patient received paraffin bath to bilateral hand(s) for 8 minutes to increase blood flow, circulation, pain management and for tissue elasticity prior to therex.     Assessment:   Problem List :   Decreased  and pinch strength,   Decreased muscle strength,   Decreased functional hand use    History Examination Decision Making Complexity Score   Occupational Profile:   Pt's full occupational profile reviewed and provided, including OD(surgical notes), including report of previous therapies.    Medical and Therapy History:     Please see "Plan" section for reference of therapy goals.    Pt with Hx/o multiple thyroid nodules, osteoporosis, reflux, senile nuclear sclerosis bilateral, left cmc pain/arthritis, right carpal tunnel syndrome    Brief review:   right carpal tunnel syndrome           Performance Deficits     Physical    Please see under "problem list" and the assessment portion of this eval note    (Biomechanical)  Body systems  -musculoskeletal: ROM,strength,FM and GM deficits,    Integumentary: intact  .    Rating: low Treatment to include :  paraffin, fluidotherapy, manual therapy/joint mobilizations,scar massage,   modalities for pain management, iontophoresis with dexamethasone, US 3mhz, therapeutic exercises/activities,        strengthening,       Clinical decision  Making of low complexity, no modifications for required to complete eval      Rating:low Low in combination of the 3 categories      G CODE TOOL: FOTO    Category: Self Care      Current Score  =  31%  impaired  Goal at Discharge Score =  15% impaired    Score interpretation is as follows:   TEST SCORE  Modifier  Impairment Limitation Restriction    0%  CH  0 % impaired, limited or restricted    1-19%  CI  @ least 1% but less than 20% " impaired, limited or restricted    20-39%  CJ  @ least 20%<40% impaired, limited or restricted    40-59%  CK  @ least 40%<60% impaired, limited or restricted    60-79%  CL  @ least 60% <80% impaired, limited or restricted    80-99%  CM  @ least 80%<100% impaired limited or restricted    100%  CN  100% impaired, limited or restricted         Goals to be met :  1) Patient to be IND with HEP and modalities for pain management by 6-8 weeks.  2)   Pt. will increase  strength 3-5 lbs. to grasp cup by 6-8 weeks.  3)   Pt. will increase pinch 1-3 psis for buttoning by 6-8 weeks.   4) Pt will score FOTO score of 16% less than current score and obtain goal score.     Plan:   Patient to be treated by Occupational Therapy    1-2    times per week for  60 days   during the certification period from   5-09-17  to 7-09-17     to achieve the established goals.  Treatment to include :  paraffin, fluidotherapy, manual therapy/joint mobilizations,  modalities for pain management, iontophoresis with dexamethasone, US 3mhz, therapeutic exercises/activities,        strengthening,    as well as any other treatments deemed necessary based on the patient's needs or progress.                   I certify the need for these services furnished under this plan of treatment and while under my care    ____________________________________                         __________________  Physician/Referring Practitioner                                               Date of Signature

## 2017-05-22 ENCOUNTER — OFFICE VISIT (OUTPATIENT)
Dept: FAMILY MEDICINE | Facility: CLINIC | Age: 67
End: 2017-05-22
Payer: MEDICARE

## 2017-05-22 VITALS
WEIGHT: 97.88 LBS | HEIGHT: 64 IN | BODY MASS INDEX: 16.71 KG/M2 | SYSTOLIC BLOOD PRESSURE: 119 MMHG | DIASTOLIC BLOOD PRESSURE: 70 MMHG | HEART RATE: 84 BPM | TEMPERATURE: 98 F

## 2017-05-22 DIAGNOSIS — J04.0 LARYNGITIS: Primary | ICD-10-CM

## 2017-05-22 DIAGNOSIS — E04.2 MULTIPLE THYROID NODULES: ICD-10-CM

## 2017-05-22 PROCEDURE — 99999 PR PBB SHADOW E&M-EST. PATIENT-LVL III: CPT | Mod: PBBFAC,,, | Performed by: FAMILY MEDICINE

## 2017-05-22 PROCEDURE — 99213 OFFICE O/P EST LOW 20 MIN: CPT | Mod: S$GLB,,, | Performed by: FAMILY MEDICINE

## 2017-05-22 PROCEDURE — 1125F AMNT PAIN NOTED PAIN PRSNT: CPT | Mod: S$GLB,,, | Performed by: FAMILY MEDICINE

## 2017-05-22 PROCEDURE — 1159F MED LIST DOCD IN RCRD: CPT | Mod: S$GLB,,, | Performed by: FAMILY MEDICINE

## 2017-05-22 PROCEDURE — 1160F RVW MEDS BY RX/DR IN RCRD: CPT | Mod: S$GLB,,, | Performed by: FAMILY MEDICINE

## 2017-05-22 PROCEDURE — 1157F ADVNC CARE PLAN IN RCRD: CPT | Mod: S$GLB,,, | Performed by: FAMILY MEDICINE

## 2017-05-22 NOTE — PROGRESS NOTES
Subjective:       Patient ID: Marshall Almanza is a 67 y.o. female.    Chief Complaint: Hoarse; Sore Throat; Cough; Generalized Body Aches; Chest Congestion; and Eye Pain (LEFT)    HPI  Cold and aches for several days, then yesterday with lots of mucous that she cant spit out and she lost her voice yesterday, no fever, also coughing for a few days but it wont come up, no chest pain or SOB,   She is very tired. Her left eye is irritated,it was not draining at all. She tried tylenol cold,. She got nervous because she lost her voice.   Review of Systems  per HPI  Objective:      Physical Exam   Constitutional: She appears well-developed and well-nourished.   HENT:   Head: Normocephalic and atraumatic.   Right Ear: External ear normal.   Left Ear: External ear normal.   Mild erythema of pharynx, Turbinate edema and congestion   Eyes: Conjunctivae and EOM are normal. Pupils are equal, round, and reactive to light.   Neck: Normal range of motion. Neck supple. No thyromegaly present.   Cardiovascular: Normal rate, regular rhythm, normal heart sounds and intact distal pulses.    Pulmonary/Chest: Effort normal and breath sounds normal.   Lymphadenopathy:     She has no cervical adenopathy.   Psychiatric: She has a normal mood and affect. Her behavior is normal. Judgment and thought content normal.   Vitals reviewed.      Assessment:         Marshall was seen today for hoarse, sore throat, cough, generalized body aches, chest congestion and eye pain.    Diagnoses and all orders for this visit:    Laryngitis  Rest and fluids and grggle with warm salty water and tylenol and mucinex dm   And rtc if not better in a week.    Multiple thyroid nodules

## 2017-05-30 ENCOUNTER — CLINICAL SUPPORT (OUTPATIENT)
Dept: REHABILITATION | Facility: HOSPITAL | Age: 67
End: 2017-05-30
Attending: ORTHOPAEDIC SURGERY
Payer: MEDICARE

## 2017-05-30 DIAGNOSIS — G56.01 CARPAL TUNNEL SYNDROME OF RIGHT WRIST: Primary | ICD-10-CM

## 2017-05-30 PROCEDURE — 97018 PARAFFIN BATH THERAPY: CPT | Mod: PO

## 2017-05-30 PROCEDURE — 97035 APP MDLTY 1+ULTRASOUND EA 15: CPT | Mod: PO

## 2017-05-30 PROCEDURE — 97110 THERAPEUTIC EXERCISES: CPT | Mod: PO

## 2017-05-30 NOTE — PROGRESS NOTES
Occupational Therapy Progress Note    Patient: Marshall Almanza  OT Diagnosis: right hand pain  Date of treatment: 05/30/2017    Total Treatment time: 60  Group Time: 0    Time in:3  Time out:4    Subjective: Pt reported, right thumb pain increased the past few days. I have this big bump on the base of my thumb that just came up. Numbness and tingling persists in left index (known left C6-c7 radiculopathy). Pt also reported later in session that she did a lot of yard work the last few days as well.    Pain upon arrival:  3/10  Pain post session: 3/10    Objective:   Pt participated in OT on this date for: Patient received paraffin bath to bilateral hand(s) x 10 minutes to increase blood flow, circulation, pain management and for tissue elasticity prior to therex. Patient received ultrasound  for pain control and decreased inflammation @ 100 duty cycle, 3.3 Mhz, applied to right wrist dorsum and volar carpal tunnel, intensity = 0.6 w/cm2 for 8 minutes. Patient received fluidotherapy to right hand(s) for 20 minutes to increase blood flow, circulation, desensitization, sensory re-education and for pain and performed the following therex in fluido: thumb opposition slides, IP flex, radial abd, palmar abd, thumb ext, lateral pinch with soft sponge all x 2 min ea. Pt. Advised to purchase over-the-counter prefab neoprene CMC splint for joint protection during strenuous activity.    Treatment: para x 10 min, us x 8, therex/fluido x 20 min    Assessment:  Pt. Demonstrated increased pain in thumb IP/MP/CMC. Pt  will continue to benefit from skilled OT intervention. Pt continues to be limited in functional and leisurely pursuits. Pain limits patients participation in ADL's. Pt is not able to carryout necessary vocational tasks. Patient continues to requires cues and skilled supervision to complete HEP         Plan:  Continue 1-2x week x 6-8  weeks during the certification period from 05/09/2017  to 07/09/2017  in pursuit of OT  goals.

## 2017-06-06 ENCOUNTER — DOCUMENTATION ONLY (OUTPATIENT)
Dept: REHABILITATION | Facility: HOSPITAL | Age: 67
End: 2017-06-06

## 2017-06-06 ENCOUNTER — CLINICAL SUPPORT (OUTPATIENT)
Dept: REHABILITATION | Facility: HOSPITAL | Age: 67
End: 2017-06-06
Attending: ORTHOPAEDIC SURGERY
Payer: MEDICARE

## 2017-06-06 DIAGNOSIS — G56.01 CARPAL TUNNEL SYNDROME OF RIGHT WRIST: ICD-10-CM

## 2017-06-06 PROCEDURE — 97110 THERAPEUTIC EXERCISES: CPT | Mod: PO

## 2017-06-06 PROCEDURE — 97035 APP MDLTY 1+ULTRASOUND EA 15: CPT | Mod: PO

## 2017-06-06 NOTE — PROGRESS NOTES
Occupational Therapy Progress Note    Patient: Marshall Almanza  OT Diagnosis: right hand pain  Date of treatment: 06/06/2017    Total Treatment time: 60  Group Time: 0    Time in:3  Time out:4    Subjective:  This right thumb looks bigger to me      Pain upon arrival:  3/10  Pain post session: 3/10    Objective:   Pt participated in OT on this date for: Patient received paraffin bath to bilateral hand(s) x 10 minutes to increase blood flow, circulation, pain management and for tissue elasticity prior to therex. Patient received ultrasound  for pain control and decreased inflammation @ 100 duty cycle, 3.3 Mhz, applied to right wrist dorsum and volar carpal tunnel, intensity = 0.6 w/cm2 for 8 minutes. Patient received fluidotherapy to right hand(s) for 20 minutes to increase blood flow, circulation, desensitization, sensory re-education and for pain and performed the following therex in fluido: thumb opposition slides, IP flex, radial abd, palmar abd, thumb ext,with soft sponge all x 2 min ea. Supplied hand out with joint protection and handed her a magazine for assistive devices     Treatment: para x 10 min, us x 8, therex/fluido x 20 min    Assessment:   Pain is less  But she does sustained pinching with gardening and thi s may be causing some of her thumb pain. She said she will try to modify after we went over having rest periods and stop when having pain. Pt  will continue to benefit from skilled OT intervention. Pt continues to be limited in functional and leisurely pursuits. Pain limits patients participation in ADL's. Pt is not able to carryout necessary vocational tasks. Patient continues to requires cues and skilled supervision to complete HEP         Plan:  Continue 1-2x week x 6-8  weeks during the certification period from 05/09/2017  to 07/09/2017  in pursuit of OT goals.

## 2017-06-12 ENCOUNTER — OFFICE VISIT (OUTPATIENT)
Dept: FAMILY MEDICINE | Facility: CLINIC | Age: 67
End: 2017-06-12
Payer: MEDICARE

## 2017-06-12 ENCOUNTER — TELEPHONE (OUTPATIENT)
Dept: FAMILY MEDICINE | Facility: CLINIC | Age: 67
End: 2017-06-12

## 2017-06-12 ENCOUNTER — NURSE TRIAGE (OUTPATIENT)
Dept: ADMINISTRATIVE | Facility: CLINIC | Age: 67
End: 2017-06-12

## 2017-06-12 ENCOUNTER — HOSPITAL ENCOUNTER (OUTPATIENT)
Dept: RADIOLOGY | Facility: HOSPITAL | Age: 67
Discharge: HOME | End: 2017-06-12
Attending: FAMILY MEDICINE
Payer: MEDICARE

## 2017-06-12 VITALS
HEIGHT: 64 IN | SYSTOLIC BLOOD PRESSURE: 114 MMHG | WEIGHT: 99.88 LBS | TEMPERATURE: 98 F | DIASTOLIC BLOOD PRESSURE: 72 MMHG | BODY MASS INDEX: 17.05 KG/M2 | HEART RATE: 76 BPM

## 2017-06-12 DIAGNOSIS — T18.9XXA FOREIGN BODY ALIMENTARY TRACT, INITIAL ENCOUNTER: Primary | ICD-10-CM

## 2017-06-12 DIAGNOSIS — R60.9 SWELLING: ICD-10-CM

## 2017-06-12 DIAGNOSIS — M54.10 RADICULOPATHY OF ARM: ICD-10-CM

## 2017-06-12 DIAGNOSIS — T18.9XXA FOREIGN BODY ALIMENTARY TRACT, INITIAL ENCOUNTER: ICD-10-CM

## 2017-06-12 DIAGNOSIS — S27.818A ABRASION OF ESOPHAGUS, INITIAL ENCOUNTER: ICD-10-CM

## 2017-06-12 PROCEDURE — 99999 PR PBB SHADOW E&M-EST. PATIENT-LVL IV: CPT | Mod: PBBFAC,,, | Performed by: FAMILY MEDICINE

## 2017-06-12 PROCEDURE — 1159F MED LIST DOCD IN RCRD: CPT | Mod: S$GLB,,, | Performed by: FAMILY MEDICINE

## 2017-06-12 PROCEDURE — 99214 OFFICE O/P EST MOD 30 MIN: CPT | Mod: 25,S$GLB,, | Performed by: FAMILY MEDICINE

## 2017-06-12 PROCEDURE — 70360 X-RAY EXAM OF NECK: CPT | Mod: TC,PO

## 2017-06-12 PROCEDURE — 70360 X-RAY EXAM OF NECK: CPT | Mod: 26,,, | Performed by: RADIOLOGY

## 2017-06-12 PROCEDURE — 96372 THER/PROPH/DIAG INJ SC/IM: CPT | Mod: S$GLB,,, | Performed by: FAMILY MEDICINE

## 2017-06-12 PROCEDURE — 1125F AMNT PAIN NOTED PAIN PRSNT: CPT | Mod: S$GLB,,, | Performed by: FAMILY MEDICINE

## 2017-06-12 RX ORDER — SUCRALFATE 1 G/1
1 TABLET ORAL
Qty: 28 TABLET | Refills: 0 | Status: SHIPPED | OUTPATIENT
Start: 2017-06-12 | End: 2018-01-02

## 2017-06-12 RX ORDER — DEXAMETHASONE SODIUM PHOSPHATE 4 MG/ML
2 INJECTION, SOLUTION INTRA-ARTICULAR; INTRALESIONAL; INTRAMUSCULAR; INTRAVENOUS; SOFT TISSUE ONCE
Status: COMPLETED | OUTPATIENT
Start: 2017-06-12 | End: 2017-06-12

## 2017-06-12 RX ADMIN — DEXAMETHASONE SODIUM PHOSPHATE 2 MG: 4 INJECTION, SOLUTION INTRA-ARTICULAR; INTRALESIONAL; INTRAMUSCULAR; INTRAVENOUS; SOFT TISSUE at 04:06

## 2017-06-12 NOTE — TELEPHONE ENCOUNTER
Spoke with Dr. Rey concerning patient states ok for patient to be seen today per her but to advise patient that she would need further testing EGD to make sure that nothing is lodged in her throat. Attempted to contact patient voicemail left to return our call.

## 2017-06-12 NOTE — TELEPHONE ENCOUNTER
"    Reason for Disposition   [1] Symptoms of pill stuck in throat or esophagus (e.g., pain in throat or chest, FB sensation) AND [2] no relief after using CARE ADVICE    Answer Assessment - Initial Assessment Questions  1. SYMPTOM: "Are you having difficulty swallowing liquids, solids, or both?"      no  2. ONSET: "When did the swallowing problems begin?"       Saturday night  3. CAUSE: "What do you think is causing the problem?"       Swallowed a fishbone  4. CHRONIC/RECURRENT: "Is this a new problem for you?"  If no, ask: "How long have you had this problem?" (e.g., days, weeks, months)       -  5. OTHER SYMPTOMS: "Do you have any other symptoms?" (e.g., difficulty breathing, sore throat, swollen tongue, chest pain)      no  6. PREGNANCY: "Is there any chance you are pregnant?" "When was your last menstrual period?"      -  Patient states she may have swallowed a fishbone on Saturday night. She states she has an intermittent sensation that something is still in her throat.    Protocols used: ST SWALLOWING DIFFICULTY-A-      "

## 2017-06-12 NOTE — TELEPHONE ENCOUNTER
Spoke with patient states after eating fish, she started feeling like something was stuck in her throat.  No pain, or discomfort, just the sensation of something stuck in her throat when she swallows.

## 2017-06-12 NOTE — PROGRESS NOTES
Decadron 2mg/0.5ml given to LT upper outer quad qluteus, per MD orders, patient tolerated well. Advised patient to wait 15min after shot is given for any adverse reaction.

## 2017-06-12 NOTE — TELEPHONE ENCOUNTER
"Per Dr. Rey contact patient concerning scheduled appt today "possible swallowed a fishbone."  attempted to contact patient voicemail left to return our call concerning appt details.   "

## 2017-06-13 NOTE — PROGRESS NOTES
Subjective:       Patient ID: Marshall Almanza is a 67 y.o. female.    Chief Complaint: Swallowed Foreign Body (fish bone, throat pain) and Hoarse   patient is been suffering from laryngitis for over 3 weeks which is not completely cleared  Patient is been using Mucinex which is causing her mouth to be dry and not really helping the hoarseness.  Patient is in today because Friday night while eating fish she swallowed a bone which she was able to cough up,  But now she has a foreign body sensation in her throat and slight pain with eating and swallowing sometimes.  Patient is afraid that some piece of the bone may remain in her throat.  Patient was also told to see her primary care  octor for referral to physical therapy for C6-7 radiculopathy in the left neck area causing upper arm pain.  HPIReview of Systems   Constitutional: Negative.    HENT: Positive for sore throat and voice change.         Foreign body sensation in the throat irritation   Respiratory: Negative.    Cardiovascular: Negative.    Gastrointestinal: Negative.    Endocrine: Negative.    Genitourinary: Negative.    Musculoskeletal: Negative.    Skin: Negative.    Allergic/Immunologic: Negative.    Neurological: Negative.    Hematological: Negative.    Psychiatric/Behavioral: Negative.        Objective:      Physical Exam   Constitutional: She is oriented to person, place, and time. She appears well-developed and well-nourished. No distress.   HENT:   Head: Normocephalic and atraumatic.   Right Ear: Hearing, tympanic membrane, external ear and ear canal normal.   Left Ear: Hearing, tympanic membrane, external ear and ear canal normal.   Nose: Nose normal. Right sinus exhibits no maxillary sinus tenderness and no frontal sinus tenderness. Left sinus exhibits no maxillary sinus tenderness and no frontal sinus tenderness.   Mouth/Throat: Uvula is midline. Posterior oropharyngeal erythema present.   Eyes: Conjunctivae and EOM are normal. Pupils are equal,  round, and reactive to light.   Neck: Normal range of motion. Neck supple. No JVD present. No thyromegaly present.   Cardiovascular: Normal rate, regular rhythm, normal heart sounds and intact distal pulses.    Pulmonary/Chest: Effort normal and breath sounds normal. No respiratory distress.   Abdominal: Soft. Bowel sounds are normal. She exhibits no distension and no mass. There is no tenderness. There is no rebound and no guarding. No hernia.   Neurological: She is alert and oriented to person, place, and time. She has normal reflexes. She displays normal reflexes. No cranial nerve deficit. Coordination normal.   Skin: Skin is warm and dry. No rash noted. She is not diaphoretic. No erythema. No pallor.   Psychiatric: She has a normal mood and affect. Her behavior is normal. Judgment and thought content normal.       Assessment:       1. Foreign body alimentary tract, initial encounter    2. Swelling    3. Radiculopathy of arm    4. Abrasion of esophagus, initial encounter        Plan:      soft tissue x-ray of the neck was obtained to rule out foreign body.  Results were discussed with the radiologist who felt that there was no visible foreign body on the x-ray.    refer to the med card dated 6/12/2017  sucralfate (CARAFATE) 1 gram tablet 1 g, Before meals & nightly         ranitidine (ZANTAC) 150 MG tablet         multivitamin (THERAGRAN) per tablet 1 tablet, Daily        dexamethasone injection 2 mg (Completed) 2 mg, Once        alendronate (FOSAMAX) 70 MG tablet        Procedures Ordered This Visit    X-Ray Neck Soft Tissue         Ambulatory consult to Physical Therapy

## 2017-06-14 ENCOUNTER — CLINICAL SUPPORT (OUTPATIENT)
Dept: REHABILITATION | Facility: HOSPITAL | Age: 67
End: 2017-06-14
Attending: ORTHOPAEDIC SURGERY
Payer: MEDICARE

## 2017-06-14 DIAGNOSIS — G56.01 CARPAL TUNNEL SYNDROME OF RIGHT WRIST: ICD-10-CM

## 2017-06-14 PROCEDURE — 97022 WHIRLPOOL THERAPY: CPT | Mod: PO

## 2017-06-14 PROCEDURE — 97035 APP MDLTY 1+ULTRASOUND EA 15: CPT | Mod: PO

## 2017-06-14 PROCEDURE — 97110 THERAPEUTIC EXERCISES: CPT | Mod: PO

## 2017-06-14 NOTE — PROGRESS NOTES
Occupational Therapy Progress Note    Patient: Marshall Almanza  OT Diagnosis: right hand pain  Date of treatment: 06/14/2017    Total Treatment time: 60  Group Time: 30    Time in:3  Time out:4    Subjective:  I am doing so much better than last time. I think the bump on my thumb is gone. I still have numbness in 3rd digit. I have been doing my exercises. I purchased a thumb splint for working in the garden. The joint protection information has really helped me have less pain. I feel so much better.    Pain upon arrival:  1/10  Pain post session: 0/10    Objective:   Pt participated in OT on this date for: Patient received paraffin bath to right hand(s) x 10 minutes to increase blood flow, circulation, pain management and for tissue elasticity prior to therex. Patient received ultrasound  for pain control and decreased inflammation @ 100 duty cycle, 3.3 Mhz, applied to right wrist/thumb dorsum, intensity = 0.5 w/cm2 for 8 minutes. Patient received fluidotherapy to right hand(s) for 12 minutes to increase blood flow, circulation, desensitization, sensory re-education and for pain and performed the following therex in fluido: thumb opposition slides, IP flex, radial abd, palmar abd, thumb ext, radial abd prolonged stretch all for 2 min ea. Pt performed following therex: lateral pinch red clothes pin x 2 min, digiextensor x 2 min, glider with red and yellow band x 2 min, yellow digiflex x 2 min.     Treatment: para x 10 min, us x 8, fluido x 12 min, therex x 8 min    Assessment:  Pt will be reassessed next session. Pt demonstrated improved AROM thumb/hand/wrist planes with decreased pain and increased strength with decreased pain. Pt is progressing well toward meeting OT goals and will be re-assessed at next visit. Pt  will continue to benefit from skilled OT intervention. Pt continues to be limited in functional and leisurely pursuits. Pain limits patients participation in ADL's. Pt is not able to carryout necessary  vocational tasks. Patient continues to requires cues and skilled supervision to complete HEP       Plan:  Continue 1-2x week x 6-8  weeks during the certification period from 05/09/2017  to 07/09/2017  in pursuit of OT goals.

## 2017-06-20 ENCOUNTER — CLINICAL SUPPORT (OUTPATIENT)
Dept: REHABILITATION | Facility: HOSPITAL | Age: 67
End: 2017-06-20
Attending: ORTHOPAEDIC SURGERY
Payer: MEDICARE

## 2017-06-20 DIAGNOSIS — G56.01 CARPAL TUNNEL SYNDROME OF RIGHT WRIST: ICD-10-CM

## 2017-06-20 PROCEDURE — 97018 PARAFFIN BATH THERAPY: CPT | Mod: PO

## 2017-06-20 PROCEDURE — 97022 WHIRLPOOL THERAPY: CPT | Mod: PO

## 2017-06-20 PROCEDURE — 97110 THERAPEUTIC EXERCISES: CPT | Mod: PO

## 2017-06-20 NOTE — PROGRESS NOTES
"Occupational Therapy Progress Note    Patient: Marshall Almanza  OT Diagnosis: right hand pain  Date of treatment: 06/20/2017    Total Treatment time: 60  Group Time: 30    Time in:2  Time out:3    Subjective:  My thumb is the only thing that really hurts right now."     Pain upon arrival:  4/10  Pain post session: 5/10    Objective:   Pt participated in OT on this date for: Patient received paraffin bath to right hand(s) x 10 minutes to increase blood flow, circulation, pain management and for tissue elasticity prior to therex. Patient received ultrasound  for pain control and decreased inflammation @ 100 duty cycle, 3.3 Mhz, applied to right wrist/thumb dorsum, intensity = 0.5 w/cm2 for 8 minutes. Patient received fluidotherapy to right hand(s) for 12 minutes to increase blood flow, circulation, desensitization, sensory re-education and for pain and performed the following therex in fluido: thumb opposition slides, IP flex, radial abd, palmar abd, thumb ext, radial abd prolonged stretch all for 2 min ea. Pt performed following therex: lateral pinch red clothes pin x 2 min, digiextensor x 2 min, glider with red and yellow band x 2 min, yellow digiflex x 2 min.     Treatment: para x 10 min, us x 8, fluido x 12 min, therex x 8 min    Assessment:  Pt with AROM and reports increase thumb pain- so will reassess next session to have true measurements. Pt  will continue to benefit from skilled OT intervention. Pt continues to be limited in functional and leisurely pursuits. Pain limits patients participation in ADL's. Pt is not able to carryout necessary vocational tasks. Patient continues to requires cues and skilled supervision to complete HEP       Plan:  Continue 1-2x week x 6-8  weeks during the certification period from 05/09/2017  to 07/09/2017  in pursuit of OT goals.      "

## 2017-06-27 ENCOUNTER — CLINICAL SUPPORT (OUTPATIENT)
Dept: REHABILITATION | Facility: HOSPITAL | Age: 67
End: 2017-06-27
Attending: ORTHOPAEDIC SURGERY
Payer: MEDICARE

## 2017-06-27 DIAGNOSIS — G56.01 CARPAL TUNNEL SYNDROME OF RIGHT WRIST: ICD-10-CM

## 2017-06-27 PROCEDURE — 97035 APP MDLTY 1+ULTRASOUND EA 15: CPT | Mod: PO

## 2017-06-27 PROCEDURE — 97018 PARAFFIN BATH THERAPY: CPT | Mod: PO

## 2017-06-27 PROCEDURE — 97022 WHIRLPOOL THERAPY: CPT | Mod: 59,PO

## 2017-06-27 PROCEDURE — 97110 THERAPEUTIC EXERCISES: CPT | Mod: PO

## 2017-06-27 NOTE — PROGRESS NOTES
"Occupational Therapy Progress Note    Patient: Marshall Almanza  OT Diagnosis: right hand pain  Date of treatment: 06/27/2017    Total Treatment time: 60  Group Time: 0    Time in:2  Time out:3    Subjective:  My thumb is the only thing that really hurts right now."     Pain upon arrival:  4/10  Pain post session: 5/10    Objective:   Pt participated in OT on this date for: Patient received paraffin bath to both hands x 10 minutes to increase blood flow, circulation, pain management and for tissue elasticity prior to therex. Patient received ultrasound  for pain control and decreased inflammation @ 100 duty cycle, 3.3 Mhz, applied to right wrist/thumb dorsum, intensity = 0.5 w/cm2 for 8 minutes. Patient received fluidotherapy to right hand(s) for 10 minutes to increase blood flow, circulation, desensitization, sensory re-education and for pain and performed the following therex in fluido: thumb opposition slides, IP flex, radial abd, palmar abd, thumb ext, all for 2 min ea. Pt performed following therex: lateral pinch red clothes pin x 2 min, digiextensor x 2 min, glider with yellow band x 2 min, yellow digiflex x 2 min. Gave pt pink putty to upgrade HEP.    Treatment: para x 10 min, us x 8, fluido x 10 min, therex x 8 min    Assessment:  Pt performed well with all thumb, , and pinch exercises. Supplied bipofreeze and red putty to add with yellow putty for strengthening. Pt with AROM and reports increase thumb pain- so will reassess next session to have true measurements. Pt  will continue to benefit from skilled OT intervention. Pt continues to be limited in functional and leisurely pursuits. Pain limits patients participation in ADL's. Pt is not able to carryout necessary vocational tasks. Patient continues to requires cues and skilled supervision to complete HEP       Plan:  Continue 1-2x week x 6-8  weeks during the certification period from 05/09/2017  to 07/09/2017  in pursuit of OT goals.      "

## 2017-07-07 ENCOUNTER — OFFICE VISIT (OUTPATIENT)
Dept: FAMILY MEDICINE | Facility: CLINIC | Age: 67
End: 2017-07-07
Payer: MEDICARE

## 2017-07-07 VITALS
WEIGHT: 101.44 LBS | BODY MASS INDEX: 17.32 KG/M2 | SYSTOLIC BLOOD PRESSURE: 116 MMHG | DIASTOLIC BLOOD PRESSURE: 71 MMHG | HEIGHT: 64 IN | HEART RATE: 88 BPM | TEMPERATURE: 98 F

## 2017-07-07 DIAGNOSIS — E88.9 METABOLIC DISORDER: ICD-10-CM

## 2017-07-07 DIAGNOSIS — R49.0 HOARSENESS: Primary | ICD-10-CM

## 2017-07-07 DIAGNOSIS — R49.0 HOARSE: Primary | ICD-10-CM

## 2017-07-07 DIAGNOSIS — M54.10 RADICULOPATHY, UNSPECIFIED SPINAL REGION: ICD-10-CM

## 2017-07-07 DIAGNOSIS — M54.2 NECK PAIN: ICD-10-CM

## 2017-07-07 PROCEDURE — 99214 OFFICE O/P EST MOD 30 MIN: CPT | Mod: S$GLB,,, | Performed by: FAMILY MEDICINE

## 2017-07-07 PROCEDURE — 1159F MED LIST DOCD IN RCRD: CPT | Mod: S$GLB,,, | Performed by: FAMILY MEDICINE

## 2017-07-07 PROCEDURE — 1126F AMNT PAIN NOTED NONE PRSNT: CPT | Mod: S$GLB,,, | Performed by: FAMILY MEDICINE

## 2017-07-07 PROCEDURE — 99499 UNLISTED E&M SERVICE: CPT | Mod: S$GLB,,, | Performed by: FAMILY MEDICINE

## 2017-07-07 PROCEDURE — 99999 PR PBB SHADOW E&M-EST. PATIENT-LVL IV: CPT | Mod: PBBFAC,,, | Performed by: FAMILY MEDICINE

## 2017-07-07 RX ORDER — METHYLPREDNISOLONE 4 MG/1
TABLET ORAL
Qty: 1 PACKAGE | Refills: 0 | Status: SHIPPED | OUTPATIENT
Start: 2017-07-07 | End: 2017-07-28

## 2017-07-08 PROBLEM — E88.9 METABOLIC DISORDER: Status: ACTIVE | Noted: 2017-07-08

## 2017-07-08 NOTE — PROGRESS NOTES
Subjective:       Patient ID: Marshall Almanza is a 67 y.o. female.    Chief Complaint: Hoarse   patient was seen a few weeks ago for a presumed foreign body stuck in the esophagus  Pain with swallowing x-rays at that time were obtained did not show any evidence of foreign body  Patient claims she no longer has foreign body sensation when she swallows however this continues to be hoarse  Patient denies fever,chills or cold SYMPTOMS at this time  HPI see above  Review of Systems   Constitutional: Negative.    HENT: Positive for voice change.    Eyes: Negative.    Respiratory: Negative.    Cardiovascular: Negative.    Gastrointestinal: Negative.    Endocrine: Negative.    Genitourinary: Negative.    Musculoskeletal: Negative.  Negative for gait problem.   Skin: Negative.    Allergic/Immunologic: Negative.    Hematological: Negative.    Psychiatric/Behavioral: Negative.        Objective:      Physical Exam   Constitutional: She is oriented to person, place, and time. She appears well-developed and well-nourished. No distress.   HENT:   Head: Normocephalic and atraumatic.   Right Ear: External ear normal.   Left Ear: External ear normal.   Nose: Nose normal.   Mouth/Throat: Oropharynx is clear and moist.   Eyes: Conjunctivae and EOM are normal. Pupils are equal, round, and reactive to light. Left eye exhibits no discharge. No scleral icterus.   Neck: Normal range of motion. Neck supple. No JVD present. No tracheal deviation present. No thyromegaly present.   Cardiovascular: Normal rate, regular rhythm, normal heart sounds and intact distal pulses.    No murmur heard.  Pulmonary/Chest: Effort normal and breath sounds normal. No respiratory distress. She has no wheezes. She has no rales. She exhibits no tenderness.   Abdominal: Soft. Bowel sounds are normal. She exhibits no distension and no mass. There is no tenderness. There is no rebound and no guarding.   Musculoskeletal: She exhibits tenderness.        Cervical back: She  exhibits decreased range of motion and tenderness.        Left upper arm: She exhibits tenderness. She exhibits no swelling, no edema and no deformity.   Lymphadenopathy:     She has no cervical adenopathy.   Neurological: She is alert and oriented to person, place, and time. She has normal reflexes.   Skin: Skin is warm and dry. No rash noted. She is not diaphoretic. No erythema. No pallor.   Psychiatric: She has a normal mood and affect. Her behavior is normal. Judgment and thought content normal.       Assessment:       1. Hoarse    2. Neck pain    3. Radiculopathy, unspecified spinal region        Plan:        Ambulatory referral to ENT         Ambulatory consult to Physical Therapy          Refer to the med card dated 7/7/2017  methylPREDNISolone (MEDROL DOSEPACK) 4 mg tablet

## 2017-07-20 ENCOUNTER — CLINICAL SUPPORT (OUTPATIENT)
Dept: REHABILITATION | Facility: HOSPITAL | Age: 67
End: 2017-07-20
Attending: FAMILY MEDICINE
Payer: MEDICARE

## 2017-07-20 DIAGNOSIS — M54.10 RADICULOPATHY, UNSPECIFIED SPINAL REGION: ICD-10-CM

## 2017-07-20 DIAGNOSIS — M54.2 NECK PAIN: ICD-10-CM

## 2017-07-20 DIAGNOSIS — M54.10 RADICULOPATHY OF ARM: ICD-10-CM

## 2017-07-20 PROCEDURE — G8978 MOBILITY CURRENT STATUS: HCPCS | Mod: CI,PO

## 2017-07-20 PROCEDURE — 97161 PT EVAL LOW COMPLEX 20 MIN: CPT | Mod: PO

## 2017-07-20 PROCEDURE — G8979 MOBILITY GOAL STATUS: HCPCS | Mod: CI,PO

## 2017-07-20 PROCEDURE — 97110 THERAPEUTIC EXERCISES: CPT | Mod: PO

## 2017-07-20 PROCEDURE — 97140 MANUAL THERAPY 1/> REGIONS: CPT | Mod: PO

## 2017-07-20 NOTE — PROGRESS NOTES
Name: Marshall Almanza  Clinic Number: [id number]  07/20/2017.     Diagnosis: c/s radic  Physician: Vernell Bravo*  Treatment Orders: PT Eval and Treat    Past Medical History:   Diagnosis Date    Multiple thyroid nodules     Osteoporosis     Reflux     Senile nuclear sclerosis - Both Eyes 10/1/2013     Current Outpatient Prescriptions   Medication Sig    alendronate (FOSAMAX) 70 MG tablet TAKE 1 TABLET BY MOUTH EVERY 7 DAYS    methylPREDNISolone (MEDROL DOSEPACK) 4 mg tablet use as directed    multivitamin (THERAGRAN) per tablet Take 1 tablet by mouth Daily.    ranitidine (ZANTAC) 150 MG tablet Bid prn    sucralfate (CARAFATE) 1 gram tablet Take 1 tablet (1 g total) by mouth 4 (four) times daily before meals and nightly.     No current facility-administered medications for this visit.      Review of patient's allergies indicates:   Allergen Reactions    Ciprofloxacin      palpitations    Flagyl [metronidazole hcl]      Cause urinary frequency     Precautions: universal       Subjective:    Previous PT: Yes; neck, CTS  Work history: numbness   Recreational activities/exercise program:     Marshall is a 67 y.o. female referred to PT returns to PT for numbness in in 2nd/index finger. sxs are not all the time, had EMG study done and was told she has C6/7 nerve root irritation. Sometimes she has pain on her upper trap. Doesn't have a specific trigger that c/o the numbness. sxs in trap will happen if she sits still for a prolonged period of time, like when she drove to [place]. No notice of reduce c/s ROM or pain with AROM of the c/s. Denies gait instability, handwriting changes or problems getting dressed.     Pain is rated on a 0-10 scale with 0 being no pain and 10 being pain requiring emergency room visit.    Diagnostic Tests: Radiographs    Patient Goals for PT: reduce n/t and neck pain       Objective:    FOTO neck Survey 17% limitations  Current -WX Goal -VC    Category: Mobility     G-Code  Modifiers  CH  0% Impaired, limited, or restricted    CI  19% - 1%  Impaired, limited, or restricted    CJ  20% - 39% Impaired, limited, or restricted    CK  40% - 59% Impaired, limited, or restricted    CL  60% - 79% Impaired, limited, or restricted    CM  80% - 99% Impaired, limited, or restricted    CN  100% Impaired, limited, or restricted        Visit # 1  Time In: 1400  Time Out: 1500  Treatment time: 60  Date of eval/re-eval: 7/20/2017    DTR R/L: Biceps C5/6 NL / NL Patella L4 NL / NL Achilles S2 NL / NL   NL 1+, 2+, 3+; ABS 0; HYP 4+; JORDI 5+    Light touch R/L: Ant shoulder C5 NL / NL Thumb C6 NL / NL Middle finger C7 NL / NL Pinky C8 NL / NL Med bicep T1 NL / NL    NL intact; DIM diminished     Alignment/appearance:   Thoracic spine:  --> nl --> inc   Lumbar spine:  --> nl --> inc  Pelvic tilt:  --> nl --> ant    Pelvic rotation:  nl  Scapular position: n/a     Strength R/L wfl throughout UE except   Tricep C7 5 / 3+        Findings R/L    Selective Functional Movement Assessment (SFMA)  Coding:  FN = functional non-painful FP = functional painful     DP = dysfunctional painful  DN = dysfunctional non-painful    Cervical flexion DP  Cervical extension DN  Cervical rotation FP / FP sxs on L both sides     Movement tests R/L     PROM ER wfl     PROM IR wfl    ULTT median n bias - / +  Spurling's - / - pain in arm, did increase sxs in upper trap     PT Evaluation Complete? YES  Discussed Plan of Care with patient: YES    [1] Manual therapy for improved ROM and/or pain relief x 15'   MWM R rotation with c/s L uni PA   Contract/relax upper trap/levator L     [2] Therapeutic exercise for strengthening and/or improving fitness x 30 '  Median n glides 2 x 8   Seated c/s retraction 10x   Self SNAG L rotation, R hand on ~ C6/7 2 x 10       Written HEP Provided: yes  Patient education: HEP  Marshall demo good understanding of the education provided. Patient demo good return demo of skill of exercises.    Problem  List: muscle length impairments, decreased motor control and mobility, impaired ADLs, activity and participation restrictions.     Personal factors: n/a    CPT Code reference        Hx  Exam  Presentation   Code     Low     0   1-2    Stable    52510     Mod     1-2   3    Evolving    60257     High     3+   4+     Unstable    17907       Assessment:    Physical therapy diagnosis: cervical rotation syndrome  Rehab potential: good    Marshall presents with the above listed impairments.  C/s radiculopathy with C6/7 involvement. Proximal sxs per ULTT. Rotation restrictions around same level.    Marshall will benefit from skilled PT services to address these impairments and progress towards the below listed functional goals.  Marshall is in agreement with the goals that will be addressed in the treatment plan.Marshall demonstrates no additional cultural, spiritual or educational needs and currently has no barriers to learning.      Medical necessity: see problem list -  indicates low / mod / high complexity based on clinical presentation that is stable / evolving / unstable.       Functional Goals  Time frame     1.   The pt will demonstrate pain free c/s rotation for improved functional mobility.   6-8 weeks     2.   The pt will report 50% less occurrences of numbness of L index finger for improved functional mobility.   4-6 weeks     3.    The pt will improve sitting tolerance with 50% reduction in sxs for improved sitting tolerance.   6 weeks     4.   The pt will be independent in performance and progression of HEP.     2-3 visits            Plan:      Proceed/Continue with POC.     Pt will be treated by physical therapy 1-2x/week during the certification period. Treatment will consist of therapeutic exercise, manual therapy, neuromuscular re-education, heat and cold modalities, electrical stimulation for pain relief and/or muscle activation, and any other types of treatment hat may be deemed medically necessary.  Marshall may at times be seen by a PTA as part of the Rehab Team.       Physical Therapist: ZANE Keys, DPT, OCS, CSCS    I certify the need for these services furnished under this plan of treatment and while under my care.       ___________________________________  Physician/Referring Practitioner        _________________  Date of Signature

## 2017-08-01 ENCOUNTER — TELEPHONE (OUTPATIENT)
Dept: OTOLARYNGOLOGY | Facility: CLINIC | Age: 67
End: 2017-08-01

## 2017-08-03 ENCOUNTER — CLINICAL SUPPORT (OUTPATIENT)
Dept: REHABILITATION | Facility: HOSPITAL | Age: 67
End: 2017-08-03
Attending: ORTHOPAEDIC SURGERY
Payer: MEDICARE

## 2017-08-03 DIAGNOSIS — G56.01 CARPAL TUNNEL SYNDROME OF RIGHT WRIST: ICD-10-CM

## 2017-08-03 PROCEDURE — 97140 MANUAL THERAPY 1/> REGIONS: CPT | Mod: PO

## 2017-08-03 PROCEDURE — 97110 THERAPEUTIC EXERCISES: CPT | Mod: PO

## 2017-08-03 NOTE — PROGRESS NOTES
"Name: Marshall Almanza  Clinic Number: [id number]  08/03/2017.     Diagnosis: c/s radic  Physician: Vernell Bravo*  Treatment Orders: PT Eval and Treat    Past Medical History:   Diagnosis Date    Multiple thyroid nodules     Osteoporosis     Reflux     Senile nuclear sclerosis - Both Eyes 10/1/2013     Current Outpatient Prescriptions   Medication Sig    alendronate (FOSAMAX) 70 MG tablet TAKE 1 TABLET BY MOUTH EVERY 7 DAYS    multivitamin (THERAGRAN) per tablet Take 1 tablet by mouth Daily.    ranitidine (ZANTAC) 150 MG tablet Bid prn    sucralfate (CARAFATE) 1 gram tablet Take 1 tablet (1 g total) by mouth 4 (four) times daily before meals and nightly.     No current facility-administered medications for this visit.      Review of patient's allergies indicates:   Allergen Reactions    Ciprofloxacin      palpitations    Flagyl [metronidazole hcl]      Cause urinary frequency     Precautions: universal       Subjective:    Previous PT: Yes; neck, CTS  Work history: numbness   Recreational activities/exercise program:     Marshall reports hand pain is "almost never." Self snag seems to help the most.       Pain is rated on a 0-10 scale with 0 being no pain and 10 being pain requiring emergency room visit.    Diagnostic Tests: Radiographs    Patient Goals for PT: reduce n/t and neck pain       Objective:    FOTO neck Survey 17% limitations  Current -KH Goal -HJ    Category: Mobility     G-Code Modifiers  CH  0% Impaired, limited, or restricted    CI  19% - 1%  Impaired, limited, or restricted    CJ  20% - 39% Impaired, limited, or restricted    CK  40% - 59% Impaired, limited, or restricted    CL  60% - 79% Impaired, limited, or restricted    CM  80% - 99% Impaired, limited, or restricted    CN  100% Impaired, limited, or restricted        Visit # 2  Time In: 1400  Time Out: 1430  Treatment time: 30  Date of eval/re-eval: 7/20/2017       PT Evaluation Complete? YES  Discussed Plan of Care with " patient: YES    [1] Manual therapy for improved ROM and/or pain relief x 15'   MWM R rotation with c/s L/R uni PA 2 x 10 R/L   Upper flexion with lower distraction       [2] Therapeutic exercise for strengthening and/or improving fitness x 30 '  Seated W's 2 x 10   Seated c/s pro/retraction 2 x 10     Written HEP Provided: yes  Patient education: HEP  Marshall demo good understanding of the education provided. Patient demo good return demo of skill of exercises.    Problem List: muscle length impairments, decreased motor control and mobility, impaired ADLs, activity and participation restrictions.     Personal factors: n/a    CPT Code reference        Hx  Exam  Presentation   Code     Low     0   1-2    Stable    18130     Mod     1-2   3    Evolving    34954     High     3+   4+     Unstable    50683       Assessment:    Physical therapy diagnosis: cervical rotation syndrome  Rehab potential: good    Marshall tolerated tx without increase in sxs. ROM and sxs reducing. Slightly stronger return of C7, did fatigue with repeated testing. Biggest complaint of n/t in hand is better.  Instructed to ensure use t/s rotation as primary instead of c/s rotation.   Marshall will benefit from skilled PT services to address these impairments and progress towards the below listed functional goals.  Marshall is in agreement with the goals that will be addressed in the treatment plan.Marshall demonstrates no additional cultural, spiritual or educational needs and currently has no barriers to learning.      Medical necessity: see problem list -  indicates low / mod / high complexity based on clinical presentation that is stable / evolving / unstable.       Functional Goals  Time frame     1.   The pt will demonstrate pain free c/s rotation for improved functional mobility.   6-8 weeks     2.   The pt will report 50% less occurrences of numbness of L index finger for improved functional mobility.   4-6 weeks     3.    The pt will  improve sitting tolerance with 50% reduction in sxs for improved sitting tolerance.   6 weeks     4.   The pt will be independent in performance and progression of HEP.     2-3 visits            Plan:      Proceed/Continue with POC.     Pt will be treated by physical therapy 1-2x/week during the certification period. Treatment will consist of therapeutic exercise, manual therapy, neuromuscular re-education, heat and cold modalities, electrical stimulation for pain relief and/or muscle activation, and any other types of treatment hat may be deemed medically necessary. Marshall may at times be seen by a PTA as part of the Rehab Team.       Physical Therapist: ZANE Keys, DPT, OCS, CSCS    I certify the need for these services furnished under this plan of treatment and while under my care.       ___________________________________  Physician/Referring Practitioner        _________________  Date of Signature

## 2017-08-08 ENCOUNTER — OFFICE VISIT (OUTPATIENT)
Dept: OTOLARYNGOLOGY | Facility: CLINIC | Age: 67
End: 2017-08-08
Payer: MEDICARE

## 2017-08-08 VITALS
HEART RATE: 85 BPM | WEIGHT: 101.19 LBS | DIASTOLIC BLOOD PRESSURE: 56 MMHG | SYSTOLIC BLOOD PRESSURE: 116 MMHG | TEMPERATURE: 98 F | BODY MASS INDEX: 17.37 KG/M2

## 2017-08-08 DIAGNOSIS — R49.0 HOARSENESS: Primary | ICD-10-CM

## 2017-08-08 PROCEDURE — 31575 DIAGNOSTIC LARYNGOSCOPY: CPT | Mod: S$GLB,,, | Performed by: OTOLARYNGOLOGY

## 2017-08-08 PROCEDURE — 1126F AMNT PAIN NOTED NONE PRSNT: CPT | Mod: S$GLB,,, | Performed by: OTOLARYNGOLOGY

## 2017-08-08 PROCEDURE — 99999 PR PBB SHADOW E&M-EST. PATIENT-LVL III: CPT | Mod: PBBFAC,,, | Performed by: OTOLARYNGOLOGY

## 2017-08-08 PROCEDURE — 99203 OFFICE O/P NEW LOW 30 MIN: CPT | Mod: 25,S$GLB,, | Performed by: OTOLARYNGOLOGY

## 2017-08-08 PROCEDURE — 1159F MED LIST DOCD IN RCRD: CPT | Mod: S$GLB,,, | Performed by: OTOLARYNGOLOGY

## 2017-08-08 PROCEDURE — 3008F BODY MASS INDEX DOCD: CPT | Mod: S$GLB,,, | Performed by: OTOLARYNGOLOGY

## 2017-08-08 NOTE — PROGRESS NOTES
Chief Complaint   Patient presents with    consult/ hoarseness         67 y.o. female presents with hoarseness that has been persistent since she developed laryngitis a few weeks ago. Her voice has been steadily improving, but is not back to baseline. 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 No throat pain or changes in swallow. No history of GERD.      Review of Systems     Constitutional: Negative for fatigue and unexpected weight change.   HENT: per HPI.  Eyes: Negative for visual disturbance.   Respiratory: Negative for shortness of breath, hemoptysis  Cardiovascular: Negative for chest pain and palpitations.   Genitourinary: Negative for dysuria and difficulty urinating.   Musculoskeletal: Negative for decreased ROM, back pain.   Skin: Negative for rash, sunburn, itching.   Neurological: Negative for dizziness and seizures.   Hematological: Negative for adenopathy. Does not bruise/bleed easily.   Psychiatric/Behavioral: Negative for agitation. The patient is not nervous/anxious.   Endocrine: Negative for rapid weight loss/weight gain, heat/cold intolerance.     Past Medical History:   Diagnosis Date    Multiple thyroid nodules     Osteoporosis     Reflux     Senile nuclear sclerosis - Both Eyes 10/1/2013       History reviewed. No pertinent surgical history.    family history includes Cancer in her father; Cataracts in her mother.    Pt  reports that she has never smoked. She has never used smokeless tobacco. She reports that she does not drink alcohol or use drugs.    Review of patient's allergies indicates:   Allergen Reactions    Ciprofloxacin      palpitations    Flagyl [metronidazole hcl]      Cause urinary frequency        Physical Exam    Vitals:    08/08/17 1410   BP: (!) 116/56   Pulse: 85   Temp: 98 °F (36.7 °C)     Body mass index is 17.37 kg/m².    Physical Exam   Constitutional: She is oriented to person, place, and time. She appears well-developed and  well-nourished. No distress.   HENT:   Head: Normocephalic and atraumatic.   Right Ear: Hearing, tympanic membrane, external ear and ear canal normal. Tympanic membrane mobility is normal. No middle ear effusion. No decreased hearing is noted.   Left Ear: Hearing, tympanic membrane, external ear and ear canal normal. Tympanic membrane mobility is normal.  No middle ear effusion. No decreased hearing is noted.   Nose: Nose normal.   Mouth/Throat: Uvula is midline, oropharynx is clear and moist and mucous membranes are normal.   Eyes: Conjunctivae, EOM and lids are normal. Pupils are equal, round, and reactive to light. Right eye exhibits no discharge. Left eye exhibits no discharge.   Neck: Trachea normal, normal range of motion and phonation normal. Neck supple. No tracheal tenderness present. No tracheal deviation, no edema and no erythema present. No thyroid mass and no thyromegaly present.   Cardiovascular: Normal heart sounds.    Pulmonary/Chest: Breath sounds normal. No stridor.   Abdominal: Soft.   Lymphadenopathy:     She has no cervical adenopathy.   Neurological: She is alert and oriented to person, place, and time.   Skin: Skin is warm and dry. No rash noted. She is not diaphoretic. No erythema. No pallor.   Psychiatric: She has a normal mood and affect.   Nursing note and vitals reviewed.    Procedure: Flexible laryngoscopy  In order to fully examine the upper aerodigestive tract, including the larynx, in a patient with a hyperactive gag reflex, flexible endoscopy is required.  After explaining the procedure and obtaining verbal consent, a timeout was performed with the patient's participation according to the universal protocol. Both nasal cavities were anesthetized with 4% Xylocaine spray mixed with Dominick-Synephrine. The flexible laryngoscope was inserted into the nasal cavity and advanced to visualize the nasal cavity, nasopharynx, the posterior oropharynx, hypopharynx, and the endolarynx with the above  findings noted. The scope was removed and the procedure terminated. The patient tolerated this procedure well without apparent complication.     Nasopharynx - the torus is clear. There are no lesions of the posterior wall.   Oropharynx - no lesions of the tongue base. There is no obvious fullness or asymmetry.  Hypopharynx - there are no lesions of the pyriform sinuses or postcricoid region    Larynx - there are no lesions of the supraglottic or glottic larynx. Vocal fold mobility is normal with complete closure.     Assessment     1. Hoarseness          Plan  In summary, Ms. Almanza is a 67 year old female with persistent hoarseness after episode of laryngitis. No masses or lesions seen on laryngoscopy. Good VC movement and closure. Her voice should continue to improve with time. Recommend rest as tolerated. Reassurance given. All questions were answered. Return to clinic if symptoms fail to improve or worsen.

## 2017-08-08 NOTE — LETTER
August 11, 2017      Michelle Bravo MD  [address] [facility]  [address]  [facility] [address]           [facility]  [address]  [facility] [address]  Phone: [phone]  Fax: [phone]          Patient: Marshall Almanza   MR Number: [id number]   YOB: 1950   Date of Visit: 8/8/2017       Dear Dr. Michelle Bravo:    Thank you for referring Marshall Almanza to me for evaluation. Attached you will find relevant portions of my assessment and plan of care.    If you have questions, please do not hesitate to call me. I look forward to following Marshall Almanza along with you.    Sincerely,        Enclosure  CC:  No Recipients    If you would like to receive this communication electronically, please contact [email or web address] or [phone] to request more information on [employer] Link access.    For providers and/or their staff who would like to refer a patient to [facility], please contact us through our one-stop-shop provider referral line, [facility], at [phone].    If you feel you have received this communication in error or would no longer like to receive these types of communications, please e-mail [email or web address]

## 2017-09-13 ENCOUNTER — TELEPHONE (OUTPATIENT)
Dept: FAMILY MEDICINE | Facility: CLINIC | Age: 67
End: 2017-09-13

## 2017-09-13 DIAGNOSIS — E78.5 HYPERLIPIDEMIA, UNSPECIFIED HYPERLIPIDEMIA TYPE: ICD-10-CM

## 2017-09-13 DIAGNOSIS — Z00.00 ROUTINE GENERAL MEDICAL EXAMINATION AT A HEALTH CARE FACILITY: Primary | ICD-10-CM

## 2017-09-13 NOTE — TELEPHONE ENCOUNTER
----- Message from Akosua Miller sent at 9/13/2017 10:47 AM CDT -----  Contact: pt   Doctor appointment and lab have been scheduled.  Please link lab orders to the lab appointment.  Date of doctor appointment:  1/2  Physical or EP:  epp  Date of lab appointment:  12/26  Comments:

## 2017-11-07 ENCOUNTER — OFFICE VISIT (OUTPATIENT)
Dept: OTOLARYNGOLOGY | Facility: CLINIC | Age: 67
End: 2017-11-07
Payer: MEDICARE

## 2017-11-07 VITALS
HEART RATE: 84 BPM | BODY MASS INDEX: 17.48 KG/M2 | WEIGHT: 101.88 LBS | DIASTOLIC BLOOD PRESSURE: 53 MMHG | TEMPERATURE: 98 F | SYSTOLIC BLOOD PRESSURE: 107 MMHG

## 2017-11-07 DIAGNOSIS — K21.9 LARYNGOPHARYNGEAL REFLUX: Primary | ICD-10-CM

## 2017-11-07 DIAGNOSIS — K21.9 GASTROESOPHAGEAL REFLUX DISEASE, ESOPHAGITIS PRESENCE NOT SPECIFIED: ICD-10-CM

## 2017-11-07 PROCEDURE — 99999 PR PBB SHADOW E&M-EST. PATIENT-LVL III: CPT | Mod: PBBFAC,,, | Performed by: OTOLARYNGOLOGY

## 2017-11-07 PROCEDURE — 31575 DIAGNOSTIC LARYNGOSCOPY: CPT | Mod: S$GLB,,, | Performed by: OTOLARYNGOLOGY

## 2017-11-07 PROCEDURE — 99213 OFFICE O/P EST LOW 20 MIN: CPT | Mod: 25,S$GLB,, | Performed by: OTOLARYNGOLOGY

## 2017-11-07 PROCEDURE — 99499 UNLISTED E&M SERVICE: CPT | Mod: S$GLB,,, | Performed by: OTOLARYNGOLOGY

## 2017-11-07 NOTE — PROGRESS NOTES
Chief Complaint   Patient presents with    hoarseness and feels like something is in throat         67 y.o. female presents with hoarseness that has been persistent since her last visit. She also continues to have globus sensation. No throat pain or changes in swallow. She has taken Ranitidine in the past for GERD, but not on currently.    Review of Systems     Constitutional: Negative for fatigue and unexpected weight change.   HENT: per HPI.  Eyes: Negative for visual disturbance.   Respiratory: Negative for shortness of breath, hemoptysis  Cardiovascular: Negative for chest pain and palpitations.   Genitourinary: Negative for dysuria and difficulty urinating.   Musculoskeletal: Negative for decreased ROM, back pain.   Skin: Negative for rash, sunburn, itching.   Neurological: Negative for dizziness and seizures.   Hematological: Negative for adenopathy. Does not bruise/bleed easily.   Psychiatric/Behavioral: Negative for agitation. The patient is not nervous/anxious.   Endocrine: Negative for rapid weight loss/weight gain, heat/cold intolerance.     Past Medical History:   Diagnosis Date    Multiple thyroid nodules     Osteoporosis     Reflux     Senile nuclear sclerosis - Both Eyes 10/1/2013       History reviewed. No pertinent surgical history.    family history includes Cancer in her father; Cataracts in her mother.    Pt  reports that she has never smoked. She has never used smokeless tobacco. She reports that she does not drink alcohol or use drugs.    Review of patient's allergies indicates:   Allergen Reactions    Ciprofloxacin      palpitations    Flagyl [metronidazole hcl]      Cause urinary frequency        Physical Exam    Vitals:    11/07/17 1353   BP: (!) 107/53   Pulse: 84   Temp: 98.4 °F (36.9 °C)     Body mass index is 17.48 kg/m².    Physical Exam   Constitutional: She is oriented to person, place, and time. She appears well-developed and well-nourished. No distress.   HENT:   Head:  Normocephalic and atraumatic.   Right Ear: Hearing, tympanic membrane, external ear and ear canal normal. Tympanic membrane mobility is normal. No middle ear effusion. No decreased hearing is noted.   Left Ear: Hearing, tympanic membrane, external ear and ear canal normal. Tympanic membrane mobility is normal.  No middle ear effusion. No decreased hearing is noted.   Nose: Nose normal.   Mouth/Throat: Uvula is midline, oropharynx is clear and moist and mucous membranes are normal.   Eyes: Conjunctivae, EOM and lids are normal. Pupils are equal, round, and reactive to light. Right eye exhibits no discharge. Left eye exhibits no discharge.   Neck: Trachea normal, normal range of motion and phonation normal. Neck supple. No tracheal tenderness present. No tracheal deviation, no edema and no erythema present. No thyroid mass and no thyromegaly present.   Cardiovascular: Normal heart sounds.    Pulmonary/Chest: Breath sounds normal. No stridor.   Abdominal: Soft.   Lymphadenopathy:     She has no cervical adenopathy.   Neurological: She is alert and oriented to person, place, and time.   Skin: Skin is warm and dry. No rash noted. She is not diaphoretic. No erythema. No pallor.   Psychiatric: She has a normal mood and affect.   Nursing note and vitals reviewed.    Procedure: Flexible laryngoscopy  In order to fully examine the upper aerodigestive tract, including the larynx, in a patient with a hyperactive gag reflex, flexible endoscopy is required.  After explaining the procedure and obtaining verbal consent, a timeout was performed with the patient's participation according to the universal protocol. Both nasal cavities were anesthetized with 4% Xylocaine spray mixed with Dominick-Synephrine. The flexible laryngoscope was inserted into the nasal cavity and advanced to visualize the nasal cavity, nasopharynx, the posterior oropharynx, hypopharynx, and the endolarynx with the above findings noted. The scope was removed and the  procedure terminated. The patient tolerated this procedure well without apparent complication.     NP: No lesions of torus or posterior wall  OP: No lesions of posterior wall or BOT. BOT is soft to palpation  Larynx: No lesions of glottic or supraglottic larynx. Normal vocal fold mobility. There is posterior commissure erythema and edema.  HP: No lesions of pyriform sinuses or postcricoid region    Assessment     1. Laryngopharyngeal reflux    2. Gastroesophageal reflux disease, esophagitis presence not specified          Plan  In summary, Ms. Almanza is a 67 year old female with persistent hoarseness and globus sensation. Findings on laryngoscopy consistent with laryngopharyngeal reflux. Discussed treatment with medication and lifestyle modifications. Handout given and Ranitidine renewed. All questions were answered. Return to clinic if symptoms fail to improve or worsen.

## 2017-11-07 NOTE — PATIENT INSTRUCTIONS
ACID REFLUX   What is acid reflux?    When we eat, food passes from the throat and into the stomach through a tube called the esophagus. At the bottom of the esophagus is a ring of muscles that acts as a valve between the esophagus and stomach, called the lower esophageal sphincter. Smoking, alcohol, and certain types of food may weaken the sphincter, so it may stop closing properly. The contents in the stomach then may leak back, or reflux, into the esophagus. This problem is called gastroesophageal reflux disease (GERD). Symptoms of GERD include heartburn, belching, regurgitation of stomach contents, and swallowing difficulties.    Sometimes, the stomach acid travels up through the esophagus and spills into the larynx or pharynx (voice box). This is called laryngopharyngeal reflux (LPR) and is irritating to the vocal folds and surrounding tissues. Often, patients with LPR do not experience heartburn as a symptom. More commonly, symptoms of LPR include hoarseness, excessive mucous resulting in frequent throat clearing, post-nasal drip, coughing, throat soreness or burning, choking episodes, difficulty swallowing, and sensation of a lump in the throat.     How is acid reflux treated?   Treatment for acid reflux can involve any combination of medication, lifestyle modifications, and surgery.   · Medications. Your doctor may prescribe a proton pump inhibitor (PPI) or an H2 blocker. If you are prescribed a PPI, take in on an empty stomach in the morning 30 minutes prior to eating breakfast. Keep in mind that it may take 4-6 weeks before symptoms begin to resolve, so do not stop medications without consulting your doctor.   · Lifestyle and dietary modifications. Eat smaller meals at a slower pace. Avoid over-eating. If you are overweight, try to lose weight. Do not lie down or exercise directly after eating; eat your last meal of the day at least 2-3 hours prior to going to sleep. Avoid tight-fitting clothes. If you  are a smoker, reduce or quit smoking. Elevate your head of bed 4-6 inches by putting phone books under the legs at the head of your bed or buy a wedge pillow, but do not use more than two regular pillows as this causes the body to curl and compresses your stomach.     Food group Foods to avoid to reduce reflux   Beverages  Whole milk, 2% milk, chocolate milk/hot chocolate, alcohol, coffee (regular and decaf), caffeinated tea, mint tea, carbonated beverages, citrus juice    Breads/grains Commercial sweet rolls, doughnuts, croissants, and other high-fat pastries    Fruits and vegetables Fried or cream-style vegetables, tomatoes, tomato-based products, citrus fruits, hot peppers    Soups and seasonings Cream, cheese, tomato-based soups, vinegar    Meats and proteins Fatty or fried meat/fish, solomon, sausage, pepperoni, lunch meat, fried eggs    Fats and oil Lard, solomon drippings, salt pork, meat drippings, gravies, highly seasoned salad dressings, nuts    Sweets/desserts Anything made with or from chocolate, peppermint, spearmint, whole milk, or cream; high-fat pastries, gum, hard candy

## 2017-12-21 ENCOUNTER — LAB VISIT (OUTPATIENT)
Dept: LAB | Facility: HOSPITAL | Age: 67
End: 2017-12-21
Attending: FAMILY MEDICINE
Payer: MEDICARE

## 2017-12-21 DIAGNOSIS — E78.5 HYPERLIPIDEMIA, UNSPECIFIED HYPERLIPIDEMIA TYPE: ICD-10-CM

## 2017-12-21 DIAGNOSIS — Z00.00 ROUTINE GENERAL MEDICAL EXAMINATION AT A HEALTH CARE FACILITY: ICD-10-CM

## 2017-12-21 LAB
ALBUMIN SERPL BCP-MCNC: 4.1 G/DL
ALP SERPL-CCNC: 57 U/L
ALT SERPL W/O P-5'-P-CCNC: 43 U/L
ANION GAP SERPL CALC-SCNC: 8 MMOL/L
AST SERPL-CCNC: 45 U/L
BASOPHILS # BLD AUTO: 0.03 K/UL
BASOPHILS NFR BLD: 0.7 %
BILIRUB SERPL-MCNC: 0.7 MG/DL
BUN SERPL-MCNC: 17 MG/DL
CALCIUM SERPL-MCNC: 9.9 MG/DL
CHLORIDE SERPL-SCNC: 100 MMOL/L
CHOLEST SERPL-MCNC: 262 MG/DL
CHOLEST/HDLC SERPL: 2.7 {RATIO}
CO2 SERPL-SCNC: 30 MMOL/L
CREAT SERPL-MCNC: 0.8 MG/DL
DIFFERENTIAL METHOD: NORMAL
EOSINOPHIL # BLD AUTO: 0 K/UL
EOSINOPHIL NFR BLD: 0.2 %
ERYTHROCYTE [DISTWIDTH] IN BLOOD BY AUTOMATED COUNT: 13.9 %
EST. GFR  (AFRICAN AMERICAN): >60 ML/MIN/1.73 M^2
EST. GFR  (NON AFRICAN AMERICAN): >60 ML/MIN/1.73 M^2
GLUCOSE SERPL-MCNC: 99 MG/DL
HCT VFR BLD AUTO: 43.5 %
HDLC SERPL-MCNC: 98 MG/DL
HDLC SERPL: 37.4 %
HGB BLD-MCNC: 14.1 G/DL
IMM GRANULOCYTES # BLD AUTO: 0.01 K/UL
IMM GRANULOCYTES NFR BLD AUTO: 0.2 %
LDLC SERPL CALC-MCNC: 148.4 MG/DL
LYMPHOCYTES # BLD AUTO: 1.1 K/UL
LYMPHOCYTES NFR BLD: 25.3 %
MCH RBC QN AUTO: 30.2 PG
MCHC RBC AUTO-ENTMCNC: 32.4 G/DL
MCV RBC AUTO: 93 FL
MONOCYTES # BLD AUTO: 0.3 K/UL
MONOCYTES NFR BLD: 6.3 %
NEUTROPHILS # BLD AUTO: 2.8 K/UL
NEUTROPHILS NFR BLD: 67.3 %
NONHDLC SERPL-MCNC: 164 MG/DL
NRBC BLD-RTO: 0 /100 WBC
PLATELET # BLD AUTO: 236 K/UL
PMV BLD AUTO: 10.1 FL
POTASSIUM SERPL-SCNC: 4.4 MMOL/L
PROT SERPL-MCNC: 7.9 G/DL
RBC # BLD AUTO: 4.67 M/UL
SODIUM SERPL-SCNC: 138 MMOL/L
TRIGL SERPL-MCNC: 78 MG/DL
TSH SERPL DL<=0.005 MIU/L-ACNC: 1.49 UIU/ML
WBC # BLD AUTO: 4.15 K/UL

## 2017-12-21 PROCEDURE — 80053 COMPREHEN METABOLIC PANEL: CPT

## 2017-12-21 PROCEDURE — 80061 LIPID PANEL: CPT

## 2017-12-21 PROCEDURE — 36415 COLL VENOUS BLD VENIPUNCTURE: CPT | Mod: PO

## 2017-12-21 PROCEDURE — 85025 COMPLETE CBC W/AUTO DIFF WBC: CPT

## 2017-12-21 PROCEDURE — 84443 ASSAY THYROID STIM HORMONE: CPT

## 2018-01-02 ENCOUNTER — OFFICE VISIT (OUTPATIENT)
Dept: FAMILY MEDICINE | Facility: CLINIC | Age: 68
End: 2018-01-02
Payer: MEDICARE

## 2018-01-02 VITALS
DIASTOLIC BLOOD PRESSURE: 70 MMHG | TEMPERATURE: 98 F | HEART RATE: 72 BPM | BODY MASS INDEX: 17 KG/M2 | RESPIRATION RATE: 16 BRPM | SYSTOLIC BLOOD PRESSURE: 112 MMHG | HEIGHT: 65 IN | WEIGHT: 102.06 LBS

## 2018-01-02 DIAGNOSIS — E04.1 THYROID NODULE: ICD-10-CM

## 2018-01-02 DIAGNOSIS — K21.00 REFLUX ESOPHAGITIS: ICD-10-CM

## 2018-01-02 DIAGNOSIS — Z12.39 SCREENING BREAST EXAMINATION: Primary | ICD-10-CM

## 2018-01-02 DIAGNOSIS — M81.0 OSTEOPOROSIS, UNSPECIFIED OSTEOPOROSIS TYPE, UNSPECIFIED PATHOLOGICAL FRACTURE PRESENCE: ICD-10-CM

## 2018-01-02 PROCEDURE — 99499 UNLISTED E&M SERVICE: CPT | Mod: S$GLB,,, | Performed by: FAMILY MEDICINE

## 2018-01-02 PROCEDURE — 99999 PR PBB SHADOW E&M-EST. PATIENT-LVL IV: CPT | Mod: PBBFAC,,, | Performed by: FAMILY MEDICINE

## 2018-01-02 PROCEDURE — 99213 OFFICE O/P EST LOW 20 MIN: CPT | Mod: S$GLB,,, | Performed by: FAMILY MEDICINE

## 2018-01-02 RX ORDER — ESOMEPRAZOLE MAGNESIUM 40 MG/1
40 CAPSULE, DELAYED RELEASE ORAL DAILY
COMMUNITY
End: 2018-01-02 | Stop reason: SDUPTHER

## 2018-01-02 RX ORDER — ESOMEPRAZOLE MAGNESIUM 40 MG/1
40 CAPSULE, DELAYED RELEASE ORAL DAILY
Qty: 90 CAPSULE | Refills: 3 | Status: SHIPPED | OUTPATIENT
Start: 2018-01-02 | End: 2018-12-04 | Stop reason: ALTCHOICE

## 2018-01-03 NOTE — PROGRESS NOTES
Marshall Almanza is a 67 y.o. female   Routine physical  Source of history: Patient  Past Medical History:   Diagnosis Date    Multiple thyroid nodules     Osteoporosis     Reflux     Senile nuclear sclerosis - Both Eyes 10/1/2013     Patient  reports that she has never smoked. She has never used smokeless tobacco. She reports that she does not drink alcohol or use drugs.  Family History   Problem Relation Age of Onset    Cataracts Mother     Cancer Father     Amblyopia Neg Hx     Blindness Neg Hx     Diabetes Neg Hx     Glaucoma Neg Hx     Hypertension Neg Hx     Macular degeneration Neg Hx     Retinal detachment Neg Hx     Strabismus Neg Hx     Stroke Neg Hx     Thyroid disease Neg Hx      ROS:  GENERAL: No fever, chills, fatigability or weight loss.  SKIN: No rashes, itching or changes in color or texture of skin.  HEAD: No headaches or recent head trauma.  EYES: Visual acuity fine. No photophobia, ocular pain or diplopia.  EARS: Denies ear pain, discharge or vertigo.  NOSE: No loss of smell, no epistaxis or postnasal drip.  MOUTH & THROAT: No hoarseness or change in voice. No excessive gum bleeding.  NODES: Denies swollen glands.  CHEST: Denies BARBOSA, cyanosis, wheezing, cough and sputum production.  CARDIOVASCULAR: Denies chest pain, PND, orthopnea or reduced exercise tolerance.  ABDOMEN: Appetite fine. No weight loss. Denies diarrhea, abdominal pain, hematemesis or blood in stool.  URINARY: No flank pain, dysuria or hematuria.  PERIPHERAL VASCULAR: No claudication or cyanosis.  MUSCULOSKELETAL: No joint stiffness or swelling. Denies back pain.  NEUROLOGIC: No history of seizures, paralysis, alteration of gait or coordination.    OBJECTIVE:  APPEARANCE: Normal appearance  Vitals:    01/02/18 1402   BP: 112/70   Pulse: 72   Resp: 16   Temp: 98.2 °F (36.8 °C)     SKIN: Normal skin turgor, no lesions.  HEENT: Both external auditory canals clear. Both tympanic membranes intact. PERRL. EOMI. Disk margins  sharp. No tonsillar enlargement. No pharyngeal erythema or exudate. No stridor.  NECK: No bruits. No cervical spine tenderness. No cervical lymphadenopathy. No thyromegaly.  NODES: No cervical, axillary or inguinal lymph node enlargement.  CHEST: Breath sounds clear bilaterally. Lungs clear to auscultation & percussion. Good air movement. No rales. No retractions. No rhonchi. No stridor. No wheezes.  CARDIOVASCULAR: Normal S1, S2. No murmurs. No edema.  BREASTS: no masses palpated in either breast or axillary area, symmetry noted.  ABDOMEN: Bowel sounds normal. No palpable aortic enlargement. No CVA tenderness. No pulsatile mass. No rebound tenderness.  PERIPHERAL VASCULAR: Femoral pulses present and symmetrical. No edema.  MUSCULOSKELETAL: Degenerative changes of both ankles, foot, knee, wrist and hand.  BACK: No CVA tenderness. There is no spasm, tenderness or radiculopathy noted with palpation and there is full range of motion.   NEUROLOGIC:   Cranial Nerves: II-XII grossly intact.  Motor: 5/5 strength major flexors/extensors. No tremor.  DTR's: Knees, Ankles 2+ and equal bilaterally; downgoing toes.  Sensory: Intact to light touch distally.  Gait & Posture: Normal gait and fine motion. No cerebellar signs.  MENTAL STATUS: Alert. Oriented x 3. Language skills normal. Memory intact. No suicidal ideation. Normal affect. Normal cognitive functions. Well kept appearance.    ASSESSMENT/PLAN:   Marshall was seen today for annual exam.    Diagnoses and all orders for this visit:    Screening breast examination  -     Mammo Digital Screening Bilateral With CAD; Future    Osteoporosis, unspecified osteoporosis type, unspecified pathological fracture presence  -     DXA Bone Density Spine And Hip; Future    Thyroid nodule  -     Cancel: US Thyroid; Future  -     US Soft Tissue Head Neck Thyroid; Future    Reflux esophagitis  -     esomeprazole (NEXIUM) 40 MG capsule; Take 1 capsule (40 mg total) by mouth once daily.      patient's labs were reviewed the patient has elevated cholesterol and cardiac risk score is relatively low.  Recommend patient follow-up in one year with labs will contact patient results of any pending tests at this time.

## 2018-01-04 ENCOUNTER — HOSPITAL ENCOUNTER (OUTPATIENT)
Dept: RADIOLOGY | Facility: HOSPITAL | Age: 68
Discharge: HOME | End: 2018-01-04
Attending: FAMILY MEDICINE
Payer: MEDICARE

## 2018-01-04 DIAGNOSIS — Z12.39 SCREENING BREAST EXAMINATION: ICD-10-CM

## 2018-01-04 PROCEDURE — 77067 SCR MAMMO BI INCL CAD: CPT | Mod: TC,PO

## 2018-01-04 PROCEDURE — 77067 SCR MAMMO BI INCL CAD: CPT | Mod: 26,,, | Performed by: RADIOLOGY

## 2018-01-04 PROCEDURE — 77063 BREAST TOMOSYNTHESIS BI: CPT | Mod: 26,,, | Performed by: RADIOLOGY

## 2018-01-08 ENCOUNTER — HOSPITAL ENCOUNTER (OUTPATIENT)
Dept: RADIOLOGY | Facility: HOSPITAL | Age: 68
Discharge: HOME | End: 2018-01-08
Attending: FAMILY MEDICINE
Payer: MEDICARE

## 2018-01-08 DIAGNOSIS — R92.8 ABNORMAL MAMMOGRAM: ICD-10-CM

## 2018-01-08 PROCEDURE — 77065 DX MAMMO INCL CAD UNI: CPT | Mod: TC,PO

## 2018-01-08 PROCEDURE — 76642 ULTRASOUND BREAST LIMITED: CPT | Mod: TC,PO,RT

## 2018-01-08 PROCEDURE — 77061 BREAST TOMOSYNTHESIS UNI: CPT | Mod: 26,,, | Performed by: RADIOLOGY

## 2018-01-08 PROCEDURE — 77065 DX MAMMO INCL CAD UNI: CPT | Mod: 26,,, | Performed by: RADIOLOGY

## 2018-01-08 PROCEDURE — 77061 BREAST TOMOSYNTHESIS UNI: CPT | Mod: TC,PO

## 2018-01-08 PROCEDURE — 76642 ULTRASOUND BREAST LIMITED: CPT | Mod: 26,RT,, | Performed by: RADIOLOGY

## 2018-01-23 ENCOUNTER — TELEPHONE (OUTPATIENT)
Dept: FAMILY MEDICINE | Facility: CLINIC | Age: 68
End: 2018-01-23

## 2018-01-23 RX ORDER — SCOLOPAMINE TRANSDERMAL SYSTEM 1 MG/1
1 PATCH, EXTENDED RELEASE TRANSDERMAL
Qty: 4 PATCH | Refills: 0 | Status: SHIPPED | OUTPATIENT
Start: 2018-01-23 | End: 2018-05-22

## 2018-01-23 NOTE — TELEPHONE ENCOUNTER
----- Message from Katherine Hooker sent at 1/23/2018 10:36 AM CST -----  Contact: Patient [phone]  Symptoms:   Motion sickness patch    Pharmacy: [facility]/pharmacy #[id number] - [place] - [phone]    Please call and advise.    Thank You

## 2018-03-19 ENCOUNTER — OFFICE VISIT (OUTPATIENT)
Dept: FAMILY MEDICINE | Facility: CLINIC | Age: 68
End: 2018-03-19
Payer: MEDICARE

## 2018-03-19 ENCOUNTER — HOSPITAL ENCOUNTER (OUTPATIENT)
Dept: RADIOLOGY | Facility: HOSPITAL | Age: 68
Discharge: HOME | End: 2018-03-19
Attending: FAMILY MEDICINE
Payer: MEDICARE

## 2018-03-19 VITALS
SYSTOLIC BLOOD PRESSURE: 101 MMHG | DIASTOLIC BLOOD PRESSURE: 50 MMHG | BODY MASS INDEX: 16.75 KG/M2 | WEIGHT: 98.13 LBS | HEIGHT: 64 IN | HEART RATE: 76 BPM | TEMPERATURE: 98 F

## 2018-03-19 DIAGNOSIS — S99.922A FOOT TRAUMA, LEFT, INITIAL ENCOUNTER: Primary | ICD-10-CM

## 2018-03-19 DIAGNOSIS — S99.922A FOOT TRAUMA, LEFT, INITIAL ENCOUNTER: ICD-10-CM

## 2018-03-19 PROCEDURE — 99213 OFFICE O/P EST LOW 20 MIN: CPT | Mod: S$GLB,,, | Performed by: FAMILY MEDICINE

## 2018-03-19 PROCEDURE — 99999 PR PBB SHADOW E&M-EST. PATIENT-LVL III: CPT | Mod: PBBFAC,,, | Performed by: FAMILY MEDICINE

## 2018-03-19 PROCEDURE — 73630 X-RAY EXAM OF FOOT: CPT | Mod: TC,FY,PO,LT

## 2018-03-19 PROCEDURE — 73630 X-RAY EXAM OF FOOT: CPT | Mod: 26,LT,, | Performed by: RADIOLOGY

## 2018-03-19 NOTE — PROGRESS NOTES
Subjective:       Patient ID: Marshall Almanza is a 68 y.o. female.    Chief Complaint: Toe Injury (left foot injury 3/16)   patient tripped and fell over her left foot Friday causing an injury to her left fourth toe  Patient is concerned that the fourth toe may be broken as is tender to palpation and hurts with walking  HPI the above  Review of Systems   Constitutional: Negative.    HENT: Negative.  Negative for mouth sores.    Eyes: Negative.    Respiratory: Negative.    Cardiovascular: Negative.    Gastrointestinal: Negative.    Endocrine: Negative.    Genitourinary: Negative.    Musculoskeletal: Positive for arthralgias and joint swelling.   Skin: Positive for color change.   Allergic/Immunologic: Negative.    Neurological: Negative.    Hematological: Negative.    Psychiatric/Behavioral: Negative.        Objective:      Physical Exam   Constitutional: She appears well-developed and well-nourished.   HENT:   Head: Normocephalic and atraumatic.   Right Ear: External ear normal.   Left Ear: External ear normal.   Nose: Nose normal.   Mouth/Throat: Oropharynx is clear and moist.   Eyes: Conjunctivae and EOM are normal. Pupils are equal, round, and reactive to light.   Neck: Normal range of motion. Neck supple.   Pulmonary/Chest: Effort normal.   Musculoskeletal: She exhibits edema, tenderness and deformity.        Right foot: Normal.        Left foot: There is decreased range of motion, tenderness, swelling and deformity.        Feet:        Assessment:       1. Foot trauma, left, initial encounter     2.     Fourth toe sprain  Plan:         ray did not show any sign of fracture, the toe was blayne taped to the third toe  Recommend rice protocol until the area feels better

## 2018-03-19 NOTE — PATIENT INSTRUCTIONS
Toe Sprain  A sprain is a stretching or tearing of the ligaments that hold a joint together. There are no broken bones. Sprains generally take from 3-6 weeks to heal. A toe sprain may be treated by taping the injured toe to the next toe. This is called buddy taping. This protects the injured toe and holds it in position. Mild sprains may not need any additional support. If the toenail has been hurt badly, it may fall off in 1-2 weeks. A new one will usually start to grow back within a month.     Home care  · Keep your leg elevated when sitting or lying down. This is very important during the first 48 hours to reduce swelling. Stay off the injured foot as much as possible until you can walk on it without pain. If needed, you may use crutches during the first week for this purpose. Crutches can be rented at many pharmacies or surgical/orthopedic supply stores.  · You may be given a cast shoe to wear to prevent movement in your toe. If not, you can use a sandal or any shoe that does not put pressure on the injured toe until the swelling and pain go away. If using a sandal, be careful not to hit your foot against anything, since another injury could make the sprain worse.  · Apply an ice pack over the injured area for 15 to 20 minutes every 3 to 6 hours. You should do this for the first 24 to 48 hours. You can make an ice pack by filling a plastic bag that seals at the top with ice cubes and then wrapping it with a thin towel. Continue to use ice packs for relief of pain and swelling as needed. As the ice melts, be careful to avoid getting your wrap, splint, or cast wet. As the ice melts, be careful to avoid getting any wrap, splint, tape, or cast wet. After 48 hours, apply heat from a warm shower or bath for 20 minutes several times daily. Alternating ice and heat may also be helpful.  · If buddy tape was applied and it becomes wet or dirty, change it. You may replace it with paper, plastic or cloth tape. Cloth tape  and paper tapes must be kept dry. Apply gauze or cotton padding between the toes, especially near webbed area. This will help prevent the skin from getting moist and breaking down. Keep the blayne tape in place for at least 4 weeks, or as advised by your healthcare provider.  · You may use over-the-counter pain medicine to control pain, unless another pain medicine was prescribed. If you have chronic liver or kidney disease or ever had a stomach ulcer or GI bleeding, talk with your healthcare provider before using these medicines.  · You may return to sports after healing, when you can run without pain.  Follow-up care  Follow up with your with your healthcare provider as advised. Sometimes fractures dont show up on the first X-ray. Bruises and sprains can sometimes hurt as much as a fracture. These injuries can take time to heal completely. If your symptoms dont improve or they get worse, talk with your healthcare provider. You may need a repeat X-ray. If X-rays were taken, you will be told of any new findings that may affect your care.  When to seek medical advice  Call your healthcare provider right away if any of these occur:  · Redness, warmth, or fluid drainage from your toe  · Pain or swelling increases  · Toes become cold, blue, numb, or tingly  Date Last Reviewed: 11/20/2015  © 9724-9510 [employer]. [address] [place] [address], [place] [address]. All rights reserved. This information is not intended as a substitute for professional medical care. Always follow your healthcare professional's instructions.      Cloth surgical tape

## 2018-04-10 DIAGNOSIS — M81.0 OSTEOPOROSIS: ICD-10-CM

## 2018-04-10 RX ORDER — ALENDRONATE SODIUM 70 MG/1
TABLET ORAL
Qty: 12 TABLET | Refills: 3 | Status: SHIPPED | OUTPATIENT
Start: 2018-04-10 | End: 2019-03-25 | Stop reason: SDUPTHER

## 2018-05-22 ENCOUNTER — HOSPITAL ENCOUNTER (OUTPATIENT)
Dept: RADIOLOGY | Facility: OTHER | Age: 68
Discharge: HOME | End: 2018-05-22
Attending: ORTHOPAEDIC SURGERY
Payer: MEDICARE

## 2018-05-22 ENCOUNTER — OFFICE VISIT (OUTPATIENT)
Dept: ORTHOPEDICS | Facility: CLINIC | Age: 68
End: 2018-05-22
Payer: MEDICARE

## 2018-05-22 VITALS
SYSTOLIC BLOOD PRESSURE: 96 MMHG | HEIGHT: 64 IN | BODY MASS INDEX: 16.75 KG/M2 | HEART RATE: 76 BPM | DIASTOLIC BLOOD PRESSURE: 63 MMHG | WEIGHT: 98.13 LBS

## 2018-05-22 DIAGNOSIS — N30.01 ACUTE CYSTITIS WITH HEMATURIA: ICD-10-CM

## 2018-05-22 DIAGNOSIS — M25.532 LEFT WRIST PAIN: Primary | ICD-10-CM

## 2018-05-22 DIAGNOSIS — M19.049 CMC ARTHRITIS: Primary | ICD-10-CM

## 2018-05-22 DIAGNOSIS — M25.532 LEFT WRIST PAIN: ICD-10-CM

## 2018-05-22 PROCEDURE — 99214 OFFICE O/P EST MOD 30 MIN: CPT | Mod: S$GLB,,, | Performed by: ORTHOPAEDIC SURGERY

## 2018-05-22 PROCEDURE — 73110 X-RAY EXAM OF WRIST: CPT | Mod: TC,FY,LT

## 2018-05-22 PROCEDURE — 99999 PR PBB SHADOW E&M-EST. PATIENT-LVL III: CPT | Mod: PBBFAC,,, | Performed by: ORTHOPAEDIC SURGERY

## 2018-05-22 PROCEDURE — 73110 X-RAY EXAM OF WRIST: CPT | Mod: 26,LT,, | Performed by: RADIOLOGY

## 2018-05-22 NOTE — PROGRESS NOTES
HPI:  Marshall Almanza is a 68 y.o. female is here today complaining of left basilar thumb pain.  She has had the symptoms for a long time about a year but over the last month her pain has worsened.  She does not have a history of trauma she does not recall any inciting event other than she may have been using his saw and felt a little sore afterwards.  She does not have any pain anywhere else and she denies numbness and tingling.  The pain does get worse with activities such as gardening and she does have stiffness.    ROS:  Patient denies constitutional symptoms, cardiac symptoms, respiratory symptoms, GI symptoms.  The remainder of the musculoskeletal ROS is included in the HPI.    PE:    AA&O x 4.  NAD  HEENT:  NCAT, sclera nonicteric  Lungs:  Respirations are equal and unlabored.  CV:  2+ bilateral upper and lower extremity pulses.  Skin:  Intact throughout.    MS - Gen:  No acute distress  CV:  Peripherally well-perfused.  Pulses 2+ bilaterally.  Lungs:  Normal respiratory effort.  Abdomen:  Soft, non-tender, non-distended  Head/Neck:  Normocephalic.  Atraumatic. No TTP, AROM and PROM intact without pain  Neuro:  CN intact without deficit, SILT throughout B/L Upper & Lower Extremities  Pelvis: No TTP, Stable to direct anterior pressure over ASIS.    MSK:  LUE:  - There is tenderness to palpation at the base of the left thumb, no erythema edema  - AROM and PROM of the shoulder elbow wrist MCP PIP and DIP is intact  - AIN/PIN/Radial/Median/Ulnar Nerves assessed in isolation without deficit  - SILT throughout  - Radial & Ulnar arteries palpated 2+  - Capillary Refill <3s      Rads:  Eaton and Littler stage II left thumb CMC arthritis    A/P:  Marshall Almanza is a 68 y.o. female with left thumb CMC arthritis  We will plan for conservative management at this time with bracing Pennsaid cream and therapy  Return to clinic in 1 month if not improved at that time we'll try steroid injection

## 2018-05-22 NOTE — PROGRESS NOTES
I have personally taken the history and examined this patient. I agree with the resident's note as stated above. Pt has CMC OA. + shoulder sign, unable to abduct thumb out of palm. + grind test. Xrays reviewed. Plan- paraffin, compound cream. Ot, bracing. Pt can RTC for steroid injection if she cont to have discomfort after OT . We did discuss surgery as well.

## 2018-05-23 ENCOUNTER — PES CALL (OUTPATIENT)
Dept: ADMINISTRATIVE | Facility: CLINIC | Age: 68
End: 2018-05-23

## 2018-05-31 ENCOUNTER — OFFICE VISIT (OUTPATIENT)
Dept: FAMILY MEDICINE | Facility: CLINIC | Age: 68
End: 2018-05-31
Payer: MEDICARE

## 2018-05-31 VITALS
TEMPERATURE: 98 F | HEIGHT: 65 IN | DIASTOLIC BLOOD PRESSURE: 68 MMHG | WEIGHT: 97 LBS | HEART RATE: 82 BPM | BODY MASS INDEX: 16.16 KG/M2 | SYSTOLIC BLOOD PRESSURE: 110 MMHG

## 2018-05-31 DIAGNOSIS — N64.52 DISCHARGE FROM BOTH NIPPLES: Primary | ICD-10-CM

## 2018-05-31 DIAGNOSIS — N89.8 VAGINAL DISCHARGE: ICD-10-CM

## 2018-05-31 DIAGNOSIS — N39.0 URINARY TRACT INFECTION WITHOUT HEMATURIA, SITE UNSPECIFIED: ICD-10-CM

## 2018-05-31 LAB
BILIRUB SERPL-MCNC: NORMAL MG/DL
BLOOD URINE, POC: NORMAL
CANDIDA RRNA VAG QL PROBE: NEGATIVE
COLOR, POC UA: YELLOW
G VAGINALIS RRNA GENITAL QL PROBE: NEGATIVE
GLUCOSE UR QL STRIP: NORMAL
KETONES UR QL STRIP: NORMAL
LEUKOCYTE ESTERASE URINE, POC: NORMAL
NITRITE, POC UA: NORMAL
PH, POC UA: 6
PROTEIN, POC: NORMAL
SPECIFIC GRAVITY, POC UA: 1.01
T VAGINALIS RRNA GENITAL QL PROBE: NEGATIVE
UROBILINOGEN, POC UA: NORMAL

## 2018-05-31 PROCEDURE — 87510 GARDNER VAG DNA DIR PROBE: CPT

## 2018-05-31 PROCEDURE — 87088 URINE BACTERIA CULTURE: CPT

## 2018-05-31 PROCEDURE — 99999 PR PBB SHADOW E&M-EST. PATIENT-LVL III: CPT | Mod: PBBFAC,,, | Performed by: FAMILY MEDICINE

## 2018-05-31 PROCEDURE — 87086 URINE CULTURE/COLONY COUNT: CPT

## 2018-05-31 PROCEDURE — 99214 OFFICE O/P EST MOD 30 MIN: CPT | Mod: 25,S$GLB,, | Performed by: FAMILY MEDICINE

## 2018-05-31 PROCEDURE — 81002 URINALYSIS NONAUTO W/O SCOPE: CPT | Mod: S$GLB,,, | Performed by: FAMILY MEDICINE

## 2018-05-31 PROCEDURE — 87480 CANDIDA DNA DIR PROBE: CPT

## 2018-05-31 PROCEDURE — 87186 SC STD MICRODIL/AGAR DIL: CPT

## 2018-05-31 PROCEDURE — 87077 CULTURE AEROBIC IDENTIFY: CPT

## 2018-05-31 RX ORDER — SULFAMETHOXAZOLE AND TRIMETHOPRIM 400; 80 MG/1; MG/1
1 TABLET ORAL 2 TIMES DAILY
Qty: 20 TABLET | Refills: 0 | Status: SHIPPED | OUTPATIENT
Start: 2018-05-31 | End: 2018-08-27

## 2018-05-31 NOTE — PATIENT INSTRUCTIONS

## 2018-06-02 LAB — BACTERIA UR CULT: NORMAL

## 2018-06-04 NOTE — PROGRESS NOTES
Subjective:       Patient ID: Marshall Almanza is a 68 y.o. female.    Chief Complaint: Vaginal Discharge (yellow)   patient noticed a yellowish discharge on her panties a couple days ago patient denies any pain or foul smell.  Patient denies being sexually active, denies possibility of an STD   HPIThe above   Review of Systems   Constitutional: Negative.    HENT: Negative.    Eyes: Negative.    Respiratory: Negative.    Cardiovascular: Negative.    Gastrointestinal: Negative.    Endocrine: Negative.    Genitourinary: Positive for difficulty urinating, dysuria and vaginal discharge.   Musculoskeletal: Negative.    Skin: Negative.    Allergic/Immunologic: Negative.    Neurological: Negative.    Hematological: Negative.    Psychiatric/Behavioral: Negative.        Objective:      Physical Exam   Constitutional: She appears well-developed and well-nourished. No distress.   HENT:   Head: Normocephalic and atraumatic.   Right Ear: External ear normal.   Left Ear: External ear normal.   Nose: Nose normal.   Mouth/Throat: Oropharynx is clear and moist.   Eyes: Conjunctivae and EOM are normal. Pupils are equal, round, and reactive to light.   Neck: Neck supple. No JVD present.   Cardiovascular: Normal rate, regular rhythm, normal heart sounds and intact distal pulses.    Pulmonary/Chest: Effort normal and breath sounds normal.   Abdominal: Soft. Bowel sounds are normal.   Genitourinary: Pelvic exam was performed with patient supine. There is no lesion or injury on the right labia. There is no lesion or injury on the left labia. Right adnexum displays no mass, no tenderness and no fullness. Left adnexum displays no mass, no tenderness and no fullness. No tenderness in the vagina. No foreign body in the vagina. No signs of injury around the vagina. Vaginal discharge found.   Nursing note and vitals reviewed.      Assessment:     #1 vaginal discharge  #2 possible urinary tract infection  #3 postmenopausal female      Plan:        POCT URINE DIPSTICK WITHOUT MICROSCOPE          Urine culture         VAGINOSIS SCREEN BY DNA PROBE          We'll contact patient with results of testing when available    Refer to the med card dated 5/31/2018   alendronate (FOSAMAX) 70 MG tablet         diclofenac sodium (PENNSAID) 2 % SoPk 2 Pump, 2 times daily        esomeprazole (NEXIUM) 40 MG capsule 40 mg, Daily        multivitamin (THERAGRAN) per tablet 1 tablet, Daily        ranitidine (ZANTAC) 150 MG tablet         sulfamethoxazole-trimethoprim 400-80mg (BACTRIM,SEPTRA) 400-80 mg per tablet 1 tablet, 2 times daily

## 2018-06-05 RX ORDER — CEPHALEXIN 250 MG/1
250 CAPSULE ORAL 4 TIMES DAILY
Qty: 28 CAPSULE | Refills: 0 | Status: SHIPPED | OUTPATIENT
Start: 2018-06-05 | End: 2018-06-12

## 2018-06-08 ENCOUNTER — CLINICAL SUPPORT (OUTPATIENT)
Dept: REHABILITATION | Facility: HOSPITAL | Age: 68
End: 2018-06-08
Payer: MEDICARE

## 2018-06-08 DIAGNOSIS — M79.645 THUMB PAIN, LEFT: ICD-10-CM

## 2018-06-08 DIAGNOSIS — M79.645 CHRONIC PAIN OF LEFT THUMB: Primary | ICD-10-CM

## 2018-06-08 DIAGNOSIS — G89.29 CHRONIC PAIN OF LEFT THUMB: Primary | ICD-10-CM

## 2018-06-08 PROCEDURE — G8988 SELF CARE GOAL STATUS: HCPCS | Mod: CJ,PO

## 2018-06-08 PROCEDURE — 97165 OT EVAL LOW COMPLEX 30 MIN: CPT | Mod: PO

## 2018-06-08 PROCEDURE — G8987 SELF CARE CURRENT STATUS: HCPCS | Mod: CK,PO

## 2018-06-08 PROCEDURE — 97035 APP MDLTY 1+ULTRASOUND EA 15: CPT | Mod: PO

## 2018-06-08 NOTE — PROGRESS NOTES
[facility] Occupational Therapy  Initial Evaluation     Evaluation Date: 2018  Patient: Marshall Almanza  YOB: 1950  Chart Number: [id number]  Referring Physician: Polo Griffin MD  Medical Diagnosis: CMC arthritis of L hand  Therapy Diagnosis: L wrist pain      Plan of Care Certification Date: 18-18  Authorization Period: 2018  Date of Return to MD: 18    Visit #:  20  Time In: 8:00  Time Out: 9:00  Total Billable Time: 60 min    Precautions:  Standard    Subjective     Involved Side: L hand  Dominant Side: Right  Date of Onset: years  Mechanism of Injury: insidious onset   History of Current Condition: been having for years and has received therapy in the past for this condition    Surgical Procedure No fracture or dislocation. Mild degenerative changes noted at the base of thumb joint.  There is cystic change at the proximal aspect of the lunate, unchanged from 2017. Soft tissues are unremarkable.re: N/A  Imagin18;       Patient's Goals for Therapy:  To increase motion to return to normal ADLs and IADLs , decrease pain    Pain:  Functional Pain Scale Rating 0-10:   8/10 with use  1/10 without use   1/10 with sleep     Location: CMC of L hand  Description: stretching pain  Easing Factors: positional changes    Functional Limitations/Social History:    Previous functional status includes: Independent with all ADLs.     Current FunctionalStatus   Home/Living environment: lives alone      Limitation of Functional Status as follows:   ADLs/IADLs:     - Feeding: difficulty picking up cups or other objects when eating    - Bathing: difficuty picking up objects and trying to reach behind back    - Dressing: difficulty reaching behind back and when buttoning    - Grooming: difficulty when using L hand    - Driving: difficulty making left turn     Leisure: Gardening    Occupation:  retired  Working presently: retired      Past Medical  History/Physical Systems Review:   Past Medical History:   Diagnosis Date    Multiple thyroid nodules     Osteoporosis     Reflux     Senile nuclear sclerosis - Both Eyes 10/1/2013     Marshall Almanza  has no past surgical history on file.    Marshall has a current medication list which includes the following prescription(s): alendronate, cephalexin, diclofenac sodium, esomeprazole, multivitamin, ranitidine, and sulfamethoxazole-trimethoprim 400-80mg.    Review of patient's allergies indicates:   Allergen Reactions    Ciprofloxacin      palpitations    Flagyl [metronidazole hcl]      Cause urinary frequency          Objective     Mental status: alert    Observation:   Skin dry from Voltaren gel    Sensation: pt reports no numbness or tingling      Wound Assessment:   No wound  Size: N/A  Location: N/A      Edema: Circumferential measurements: no edema noted          Range of Motion:   Left Active   6/8/2018                   THUMB                          MP Flex 31 (pain with extension)                         IP Flex 70 (pain with extension)                         Weiss ABD                          Radial ABD                          Opposition Volar MP of RF pain free       Wrist ROM: Left  Active   6/8/2018   Flexion    Extension Pain at end range   Radial Deviation    Ulnar Deviation    Supination    Pronation      Special Tests:   Left   6/8/2018   Thumb CMC Grind Test    Finkelstein's Test Positive    Phalen's Test    Tinel's Test     Grady's Test    Extrinsic Tightness Test    Intrinsic Tightness Test    ORL Test    Froment's Sign    Romina's Sign     Olivarez Test  Piano Key Test    ECU Synergy Test    TFCC Load Test        Manual Muscle Testing:   Manual Muscle Testing:       Radial Nerve Innervated:  EPL, APB 3/5          Strength: (TRINI Dynamometer in lbs.) Average 3 trials, Position II:     6/8/2018 6/8/2018   Rung 2 Left Right   TRINI # 2 22# 42#       Pinch Strength (Measured in psi)      6/8/2018 6/8/2018    Left Right   Key Pinch 2 psi 9 psi   3pt Pinch 1.5 psi 7 psi   2pt Pinch 0 psi 4 psi       Outcome Measure: FOTO    Category: Self Care      Current Score  = 51% impaired, CK = at least 40% but < 60% impaired, limited or restricted  Goal at Discharge Score =  32% impaired CJ = at least 20% but < 40% impaired, limited or restricted    Score interpretation is as follows:   TEST SCORE  Modifier  Impairment Limitation Restriction    0%  CH  0 % impaired, limited or restricted    1-19%  CI  @ least 1% but less than 20% impaired, limited or restricted    20-39%  CJ  @ least 20%<40% impaired, limited or restricted    40-59%  CK  @ least 40%<60% impaired, limited or restricted    60-79%  CL  @ least 60% <80% impaired, limited or restricted    80-99%  CM  @ least 80%<100% impaired limited or restricted    100%  CN  100% impaired, limited or restricted             Treatment     Treatment Time In (separate from total time) :  Treatment Time Out (separate from total time :    Issued HEP and educated on modality use for pain management (see patient instructions). Pt verbally understood the instructions as they were given and demonstrated proper form and technique during therapy. Pt was advised to perform these exercises free of pain, and to stop performing them if pain occurs. Patient received ultrasound to dorsal L wrist area to increase blood flow, circulation, tissue elasticity, pain management and for wound/scar management for 8 minutes @ 3.3 Mhz, Intensity 0.6 w/cm2 at  duty cycle.     [facility]    Patient/Family Education: role of OT, goals for OT, scheduling/cancellations - pt verbalized understanding. Discussed insurance limitations with patient.        Assessment       Anticipated barriers to occupational therapy: none  Pt has no cultural, educational or language barriers to learning provided.    Profile and History Assessment of Occupational Performance Level of Clinical Decision Making Complexity  Score   Occupational Profile:   Marshall Almanza is a 68 y.o. female who lives alone and is currently retired. Marshall Almanza has difficulty with  feeding, bathing, grooming and dressing  driving/transportation management and housework/household chores  affecting his/her daily functional abilities. His/her main goal for therapy is to increase motion to return to normal ADLs/IADLs and to decrease pain.     Comorbidities:   none    Medical and Therapy History Review:   Expanded               Performance Deficits    Physical:  Joint Mobility  Joint Stability  Muscle Power/Strength   Strength  Pinch Strength  Gross Motor Coordination  Fine Motor Coordination  Pain    Cognitive:  No Deficits    Psychosocial:    No Deficits     Clinical Decision Making:  low    Assessment Process:  Problem-Focused Assessments    Modification/Need for Assistance:  Not Necessary    Intervention Selection:  Limited Treatment Options       low  Based on PMHX, co morbidities , data from assessments and functional level of assistance required with task and clinical presentation directly impacting function.     Assessment  This 68 y.o. female referred to Outpatient Occupational Therapy with diagnosis of   CMC arthritis of L hand presents with limitations as described in problem list. Patient can benefit from Occupational Therapy services for Ultrasound, Theraputic exercises, home exercise program provied with written instructions and strengthening. The following goals were discussed with the patient and she is in agreement with them as to be addressed in the treatment plan.     Problem List:   Decreased function of Left UE, Decreased ROM, Increased pain and Decreased strength      Goals:     Goals to be met in 6-8 weeks:    1) Patient to be IND with HEP and modalities for pain managment.  2)Pt will increase  strength 10-15 lbs. to improve functional grasp for ADLs/work/leisure activities.   3) Patient will decrease complaints of pain to 4out  of 10 (with use) to increase functional hand use for ADL/work/leisure activities.   4) Pt will increase pinch strength in all 3 limited positions by 1-3 psi to assist with manipulation and fine motor task.  5) Pt's FOTO score to improve from 51% impaired to less than 40% impaired by 6-8 wks.        Plan     Plan  Marshall to be treated by Occupational Therapy  1-2  times per week for 6-8 weeks to achieve the established goals.   Treatment to include Ultrasoud @ 3mHz and Strengthening Theraband Ex as well as any other treatments deemed necessary based on the patient's needs or progress          Patsy Varghese, OTR/L, CHT

## 2018-06-08 NOTE — PATIENT INSTRUCTIONS
Wrist: Radial / Ulnar Deviation        Left forearm on table and held still, child moves hand:  Radial Deviation: Toward thumb side.  Ulnar Deviation: Direction of pinkie.  Hold ____ seconds. Repeat ____ times. Do ____ sessions per day.  Activity: Use movements to cover pictures or flat objects.*    Copyright © I. All rights reserved.   Extension: Stretch - Flexors        Position Reynolds: Stabilize left forearm. Straighten fingers. Motion - Reynolds lifts hand back, bending at wrist. - Fingers remain straight. - Keep pressure mainly on palm.  Hold ___ seconds. Repeat ___ times. Repeat with other arm. Do ___ sessions per day. Variation: Perform with thumb out to side and fingers apart.    Copyright © [facility]. All rights reserved.   AROM: Thumb Abduction / Adduction          Copyright © I. All rights reserved.   AROM: Thumb Flexion / Extension        Actively bend right thumb across palm as far as possible. Hold _3__ seconds. Relax. Then pull thumb back into hitchhike position.  Repeat __15__ times per set.  Do __5__ sessions per day.    Copyright © I. All rights reserved.   IP Flexion (Active Blocked)        Brace thumb below tip joint. Bend joint as far as possible.  Repeat __15__ times. Do __5__ sessions per day.

## 2018-06-14 ENCOUNTER — CLINICAL SUPPORT (OUTPATIENT)
Dept: REHABILITATION | Facility: HOSPITAL | Age: 68
End: 2018-06-14
Payer: MEDICARE

## 2018-06-14 DIAGNOSIS — M79.645 THUMB PAIN, LEFT: ICD-10-CM

## 2018-06-14 PROCEDURE — 97022 WHIRLPOOL THERAPY: CPT | Mod: PO

## 2018-06-14 PROCEDURE — 97110 THERAPEUTIC EXERCISES: CPT | Mod: PO

## 2018-06-14 PROCEDURE — 97140 MANUAL THERAPY 1/> REGIONS: CPT | Mod: PO

## 2018-06-14 NOTE — PROGRESS NOTES
Occupational Therapy Daily Treatment Note     Name: Marshall Almanza  Clinic Number: [id number]    Therapy Diagnosis:   Encounter Diagnosis   Name Primary?    Thumb pain, left      Physician: Polo Griffin MD    Physician Orders: eval and treat  Medical Diagnosis: CMC arthritis of L hand  Evaluation Date: 6/8/18  Insurance Authorization period Expiration: 12/31/18  Plan of Care Certification Period: 7/23/18  Date of Return to MD: 6/19/18     Visit #: 2 of 20  Time In: 11:00  Time Out: 12:00  Total Billable Time: 45 min     Precautions:  Standard    Subjective     Pt reports: she was compliant with home exercise program given last session. She reports continued pain at CMC and near 1st dorsal compartment    Response to previous treatment: Pt states she experienced temporary relief of symptoms following paraffin bath at last tx and is going to start doing that at home to manage the pain symptoms  Pain: 6/10  Location: left thumb pain at CMC and left 1st dorsal compartment     Objective     Marshall received the following supervised modalities after being cleared for contradictions for 20 minutes:   -Patient received paraffin bath to L hand for 10 minutes to increase blood flow, circulation, pain management and for tissue elasticity prior to therex.   -Patient received ice massage to L 1st dorsal compartment for 10 minutes following therapeutic exercise    Marshall received the following direct contact modalities after being cleared for contraindications for 10 minutes:  -Patient received ultrasound to L 1st dorsal compartment area to increase blood flow, circulation, tissue elasticity, pain management and for wound/scar management for 10 minutes @ 3.3 Mhz, Intensity .6 w/cm2 at 20% duty cycle.     Marshall received the following manual therapy techniques for 10 minutes:   -Pt performed PROM of L thumb CMC coupled with grade 1 and 2 mobilization and distraction for pain relief.     Donmarytelma  received therapeutic exercises for 20 minutes including:  -Pt performed L thumb MCP flexion, MP extension, palmar abduction, and radial abduction in fluidotherapy for 2 minutes of continuous motion for each movement.  -Pt performed lateral pinch and turn as well as 3pt pinch and turn exercise with putty tool and yellow theraputty for 2 sets of 8 for both pinches and in both directions with supination/pronation  -Pt was instructed on and participated in the following therapeutic exercises for CMC surrounding musculature and pain: thumb adductor pollicis stretch/web space stretch, IP blocking, isometric hold first dorsal interossei x 10 reps with 10 second holds, pain free.     Home Exercises and Education Provided     Education provided:   - pt instructed on the proper use of paraffin as a pain management modality  - Progress towards goals     Written Home Exercises Provided: continue current HEP.  Exercises were reviewed and Marshall was able to demonstrate them prior to the end of the session.  Marshall demonstrated good  understanding of the education provided.     Pt received a written copy of exercises to perform at home.   See EMR under patient instructions for exercises given.     Marshall demonstrated good  understanding of the education provided.     Assessment     Pt presented today complaining of pain in the L 1st dorsal compartment. The thumb was in a guarded position, adducted over the index finger. Pt reported relief of symptoms following paraffin and manual therapy techniques. Pt was able achieve full PROM without pain during distraction and increased in AROM following the mobilizations. Pt was able to tolerate AROM of thumb and light isometric thumb strengthening with minimal to moderate pain and required minimal verbal cuing for wrist positioning. Pt would continue to benefit from skilled OT.  Marshall is progressing well towards her goals and there are no updates to goals at this time. Pt prognosis  "is Good. Pt will continue to benefit from skilled outpatient occupational therapy to address the deficits listed in the problem list on initial evalution provide pt/family education and to maximize pt's level of independence in the home and community environment.     Anticipated barriers to occupational therapy: degenerative joint condition  Pt's spiritual, cultural and educational needs considered and pt agreeable to plan of care and goals.    Goals:  Goals to be met in 6-8 weeks:     1) Patient to be IND with HEP and modalities for pain managment.  2)Pt will increase  strength 10-15 lbs. to improve functional grasp for ADLs/work/leisure activities.   3) Patient will decrease complaints of pain to 4out of 10 (with use) to increase functional hand use for ADL/work/leisure activities.   4) Pt will increase pinch strength in all 3 limited positions by 1-3 psi to assist with manipulation and fine motor task.  5) Pt's FOTO score to improve from 51% impaired to less than 40% impaired by 6-8 wks.       Plan     Discussed Plan of Care with patient: Yes  Updates/Grading for next session: TGE    Student: Luis Galicia    " I certify that I was present in the room directing the student in service delivery and guiding them using my skilled judgement. As the co-signing therapist, I have reviewed the student's documentation and am responsible for the treatment, assessment and plan."    Therapist: TEA Aviles OT   "

## 2018-06-15 ENCOUNTER — CLINICAL SUPPORT (OUTPATIENT)
Dept: REHABILITATION | Facility: HOSPITAL | Age: 68
End: 2018-06-15
Payer: MEDICARE

## 2018-06-15 DIAGNOSIS — M79.645 THUMB PAIN, LEFT: ICD-10-CM

## 2018-06-15 PROBLEM — G56.00 CARPAL TUNNEL SYNDROME: Status: RESOLVED | Noted: 2017-05-09 | Resolved: 2018-06-15

## 2018-06-15 PROCEDURE — 97110 THERAPEUTIC EXERCISES: CPT | Mod: PO

## 2018-06-15 PROCEDURE — 97035 APP MDLTY 1+ULTRASOUND EA 15: CPT | Mod: PO

## 2018-06-15 PROCEDURE — 97018 PARAFFIN BATH THERAPY: CPT | Mod: PO

## 2018-06-15 NOTE — PROGRESS NOTES
Occupational Therapy Daily Treatment Note     Name: Marshall Almanza  Clinic Number: [id number]    Therapy Diagnosis:   Encounter Diagnosis   Name Primary?    Thumb pain, left      Physician: Polo Griffin MD    Physician Orders: eval and treat  Medical Diagnosis: CMC arthritis of L hand  Evaluation Date: 6/8/18  Insurance Authorization period Expiration: 12/31/18  Plan of Care Certification Period: 7/23/18  Date of Return to MD: 6/19/18     Visit #: 3 of 20  Time In: 10:00  Time Out: 11  Total Billable Time: 45 min     Precautions:  Standard    Subjective     Pt reports: she was compliant with home exercise program given last session. She reports continued pain at CMC and near 1st dorsal compartment, but it's less than before. Pt sleeping with orthosis.  Response to previous treatment: Pt states she experienced temporary relief of symptoms following paraffin bath at last tx and is going to start doing that at home to manage the pain symptoms  Pain: 8 to 9/10 (depending on movement and if pressure is applied to CMC); 0/10 at rest  Location: left thumb pain at CMC and left 1st dorsal compartment     Objective     Marshall received the following supervised modalities after being cleared for contradictions for 20 minutes:   -Patient received paraffin bath to L hand for 8 minutes to increase blood flow, circulation, pain management and for tissue elasticity prior to therex.   -Patient received ice massage to L 1st dorsal compartment for 10 minutes following therapeutic exercise    Marshall received the following direct contact modalities after being cleared for contraindications for 10 minutes:  -Patient received ultrasound to L 1st dorsal compartment area to increase blood flow, circulation, tissue elasticity, pain management and for wound/scar management for 10 minutes @ 3.3 Mhz, Intensity .6 w/cm2 at 20% duty cycle.     Marshall received the following manual therapy techniques for 10  minutes:   -Pt performed PROM of L thumb CMC coupled with grade 1 and 2 mobilization and distraction for pain relief.     Marshall received therapeutic exercises for 20 minutes including:  -Pt performed L thumb MCP flexion, MP extension, palmar abduction, and radial abduction in fluidotherapy for 2 minutes of continuous motion for each movement.  -Pt performed lateral pinch and turn as well as 3pt pinch and turn exercise with yellow theraputty for 2 min.  -Pt was instructed on and participated in the following therapeutic exercises for CMC surrounding musculature and pain: IP blocking, isometric hold first dorsal interossei x 10 reps with 10 second holds, pain free.     Home Exercises and Education Provided     Education provided:   - pt instructed on the proper use of paraffin as a pain management modality  - Progress towards goals     Written Home Exercises Provided: continue current HEP.  Exercises were reviewed and Marshall was able to demonstrate them prior to the end of the session.  Marshall demonstrated good  understanding of the education provided.     Pt received a written copy of exercises to perform at home.   See EMR under patient instructions for exercises given.     Marshall demonstrated good  understanding of the education provided.     Assessment     Pt presented today complaining of pain in the L 1st dorsal compartment, but she reported the pain is less than before. Pt reported swelling of L CMC area the other day. The thumb was in a guarded position, adducted over the index finger. Pt reported relief of symptoms following paraffin and manual therapy techniques. Pt was able achieve full PROM without pain during distraction and increased in AROM following the mobilizations. Pt was able to tolerate AROM of thumb and light isometric thumb strengthening with minimal to moderate pain and required minimal verbal cuing for wrist positioning. Pt would continue to benefit from skilled OT.  Marshall is  "progressing well towards her goals and there are no updates to goals at this time. Pt prognosis is Good. Pt will continue to benefit from skilled outpatient occupational therapy to address the deficits listed in the problem list on initial evalution provide pt/family education and to maximize pt's level of independence in the home and community environment.     Anticipated barriers to occupational therapy: degenerative joint condition  Pt's spiritual, cultural and educational needs considered and pt agreeable to plan of care and goals.    Goals:  Goals to be met in 6-8 weeks:     1) Patient to be IND with HEP and modalities for pain managment.  2)Pt will increase  strength 10-15 lbs. to improve functional grasp for ADLs/work/leisure activities.   3) Patient will decrease complaints of pain to 4out of 10 (with use) to increase functional hand use for ADL/work/leisure activities.   4) Pt will increase pinch strength in all 3 limited positions by 1-3 psi to assist with manipulation and fine motor task.  5) Pt's FOTO score to improve from 51% impaired to less than 40% impaired by 6-8 wks.       Plan     Discussed Plan of Care with patient: Yes  Updates/Grading for next session: TGE    Student:     " I certify that I was present in the room directing the student in service delivery and guiding them using my skilled judgement. As the co-signing therapist, I have reviewed the student's documentation and am responsible for the treatment, assessment and plan."    Therapist: TEA Kaufman OT   "

## 2018-06-19 ENCOUNTER — OFFICE VISIT (OUTPATIENT)
Dept: ORTHOPEDICS | Facility: CLINIC | Age: 68
End: 2018-06-19
Payer: MEDICARE

## 2018-06-19 VITALS
SYSTOLIC BLOOD PRESSURE: 99 MMHG | WEIGHT: 97 LBS | BODY MASS INDEX: 16.16 KG/M2 | HEART RATE: 82 BPM | RESPIRATION RATE: 18 BRPM | HEIGHT: 65 IN | DIASTOLIC BLOOD PRESSURE: 63 MMHG

## 2018-06-19 DIAGNOSIS — M65.4 DE QUERVAIN'S DISEASE (TENOSYNOVITIS): Primary | ICD-10-CM

## 2018-06-19 PROCEDURE — 99212 OFFICE O/P EST SF 10 MIN: CPT | Mod: 25,S$GLB,, | Performed by: ORTHOPAEDIC SURGERY

## 2018-06-19 PROCEDURE — 99999 PR PBB SHADOW E&M-EST. PATIENT-LVL III: CPT | Mod: PBBFAC,,, | Performed by: ORTHOPAEDIC SURGERY

## 2018-06-19 PROCEDURE — 20550 NJX 1 TENDON SHEATH/LIGAMENT: CPT | Mod: LT,S$GLB,, | Performed by: ORTHOPAEDIC SURGERY

## 2018-06-19 PROCEDURE — 76942 ECHO GUIDE FOR BIOPSY: CPT | Mod: S$GLB,,, | Performed by: ORTHOPAEDIC SURGERY

## 2018-06-19 RX ORDER — DICLOFENAC SODIUM 1 MG/ML
1 SOLUTION/ DROPS OPHTHALMIC 4 TIMES DAILY
COMMUNITY
End: 2018-08-27

## 2018-06-19 RX ADMIN — DEXAMETHASONE SODIUM PHOSPHATE 4 MG: 4 INJECTION, SOLUTION INTRA-ARTICULAR; INTRALESIONAL; INTRAMUSCULAR; INTRAVENOUS; SOFT TISSUE at 11:06

## 2018-06-19 NOTE — PROGRESS NOTES
I have personally taken the history and examined this patient. I agree with the resident's note as stated above. Plan for first dorsal compartment injection today. Pt does have pain at CMC joint today the pain is more along her first dorsal compartment. Pt will cont OT

## 2018-06-19 NOTE — PROGRESS NOTES
HPI:  Marshall Almanza is a 68 y.o. female who presents to clinic for followup of left CMC arthritis. Patient last seen in clinic 5/22/18 and was given pennsaid cream and OT. Pt reports no improvement in pain since her last visit. Pain is localized to base of thumb and worse with grasping objects. She would like to try CSI today.     ROS:  Patient denies constitutional symptoms, cardiac symptoms, respiratory symptoms, GI symptoms.  The remainder of the musculoskeletal ROS is included in the HPI.    PE:    AA&O x 4.  NAD  HEENT:  NCAT, sclera nonicteric  Lungs:  Respirations are equal and unlabored.  CV:  2+ bilateral upper and lower extremity pulses.  Skin:  Intact throughout.    MS - Gen:  No acute distress  CV:  Peripherally well-perfused.  Pulses 2+ bilaterally.  Lungs:  Normal respiratory effort.  Abdomen:  Soft, non-tender, non-distended  Head/Neck:  Normocephalic.  Atraumatic. No TTP, AROM and PROM intact without pain  Neuro:  CN intact without deficit, SILT throughout B/L Upper & Lower Extremities  Pelvis: No TTP, Stable to direct anterior pressure over ASIS.    MSK:  LUE:  - There is tenderness to palpation at the base of the left thumb, no erythema edema  - AROM and PROM of the shoulder elbow wrist MCP PIP and DIP is intact  - AIN/PIN/Radial/Median/Ulnar Nerves assessed in isolation without deficit  - SILT throughout  - Radial & Ulnar arteries palpated 2+  - Capillary Refill <3s  - mild TTP over 1st dorsal compartment and CMC jt  - negative finkelstein      Rads:  Eaton and Littler stage II left thumb CMC arthritis    A/P:  Marshall Almanza is a 68 y.o. female with left thumb CMC arthritis and left thumb Dequervains  - CSI L 1st dorsal compartment today  - continue OT  - RTC if symptoms persist

## 2018-06-19 NOTE — PROCEDURES
L first doral compartmentTendon Sheath  Date/Time: 6/19/2018 11:05 AM  Performed by: NIA ARCOS  Authorized by: NIA ARCOS     Consent Done?: Yes (Verbal)  Timeout: prior to procedure the correct patient, procedure, and site was verified    Indications:  Pain  Timeout: prior to procedure the correct patient, procedure, and site was verified    Location:  Wrist  Prep: patient was prepped and draped in usual sterile fashion    Ultrasonic guidance for needle placement?: Yes    Needle size:  25 G  Approach:  Radial  Medications:  4 mg dexamethasone 4 mg/mL

## 2018-06-20 ENCOUNTER — CLINICAL SUPPORT (OUTPATIENT)
Dept: REHABILITATION | Facility: HOSPITAL | Age: 68
End: 2018-06-20
Payer: MEDICARE

## 2018-06-20 DIAGNOSIS — M79.645 THUMB PAIN, LEFT: ICD-10-CM

## 2018-06-20 PROCEDURE — 97110 THERAPEUTIC EXERCISES: CPT | Mod: PO

## 2018-06-20 PROCEDURE — 97140 MANUAL THERAPY 1/> REGIONS: CPT | Mod: PO

## 2018-06-20 PROCEDURE — 97035 APP MDLTY 1+ULTRASOUND EA 15: CPT | Mod: PO

## 2018-06-20 RX ORDER — DEXAMETHASONE SODIUM PHOSPHATE 4 MG/ML
4 INJECTION, SOLUTION INTRA-ARTICULAR; INTRALESIONAL; INTRAMUSCULAR; INTRAVENOUS; SOFT TISSUE
Status: DISCONTINUED | OUTPATIENT
Start: 2018-06-19 | End: 2018-06-20 | Stop reason: HOSPADM

## 2018-06-20 NOTE — PROGRESS NOTES
Occupational Therapy Daily Treatment Note     Name: Marshall Almanza  Clinic Number: [id number]    Therapy Diagnosis:   Encounter Diagnosis   Name Primary?    Thumb pain, left      Physician: Polo Griffin MD    Physician Orders: eval and treat  Medical Diagnosis: CMC arthritis of L hand  Evaluation Date: 6/8/18  Insurance Authorization period Expiration: 12/31/18  Plan of Care Certification Period: 7/23/18  Date of Return to MD: none as of yet     Visit #: 4 of 20  Time In: 3:00  Time Out: 3:45  Total Billable Time: 45 min     Precautions:  Standard    Subjective     Pt reports: she was compliant with home exercise program. Pt reported less pain today due to injection the previous day at her doctor visit. Pt sleeping with orthosis.  Response to previous treatment:   Pain:  5 to 6/10 (depending on movement and if pressure is applied to CMC); 0/10 at rest  Location: left thumb pain at CMC and left 1st dorsal compartment     Objective     Marshall received the following supervised modalities after being cleared for contradictions for 8 minutes:   -Patient received paraffin bath to L hand for 8 minutes to increase blood flow and circulation prior to therex.     Marshall received the following direct contact modalities after being cleared for contraindications for 8 minutes:  -Patient received ultrasound to L 1st dorsal compartment area for tissue elasticity and pain management for 8 minutes @ 3.3 Mhz, Intensity 0.6 w/cm2 at 20% duty cycle.     Marshall received the following manual therapy techniques for 10 minutes:   -Pt performed PROM of L thumb CMC coupled with grade 1 and 2 mobilization and distraction for pain relief.     Marshall received therapeutic exercises for 20 minutes including:  -Pt performed L thumb MCP flexion, MP extension, palmar abduction, and radial abduction in fluidotherapy for 2 minutes of continuous motion for each movement.  -Pt performed lateral pinch and turn as  well as 3pt pinch and turn exercise with yellow theraputty for 2 min.  -Pt was instructed on and participated in the following therapeutic exercises for CMC surrounding musculature and pain: IP blocking, isometric hold first dorsal interossei x 10 reps with 10 second holds, pain free.     Home Exercises and Education Provided     Education provided:   - Progress towards goals     Written Home Exercises Provided: continue current HEP.      See EMR under patient instructions for exercises given.     Marshall demonstrated good  understanding of the education provided.     Assessment     Pt reported a decrease in pain in the L CMC area due to receiving an injection at the doctor the previous day. Pt reported she feels pain while performing UD and RD at home.  Pt was able to tolerate AROM of thumb and light isometric thumb strengthening with minimal to moderate pain and required minimal verbal cuing for wrist positioning. Pt would continue to benefit from skilled OT.  Marshall is progressing well towards her goals and there are no updates to goals at this time. Pt prognosis is Good. Pt will continue to benefit from skilled outpatient occupational therapy to address the deficits listed in the problem list on initial evalution provide pt/family education and to maximize pt's level of independence in the home and community environment.     Anticipated barriers to occupational therapy: degenerative joint condition  Pt's spiritual, cultural and educational needs considered and pt agreeable to plan of care and goals.    Goals:  Goals to be met in 6-8 weeks:     1) Patient to be IND with HEP and modalities for pain managment.  2)Pt will increase  strength 10-15 lbs. to improve functional grasp for ADLs/work/leisure activities.   3) Patient will decrease complaints of pain to 4out of 10 (with use) to increase functional hand use for ADL/work/leisure activities.   4) Pt will increase pinch strength in all 3 limited positions  "by 1-3 psi to assist with manipulation and fine motor task.  5) Pt's FOTO score to improve from 51% impaired to less than 40% impaired by 6-8 wks.       Plan     Discussed Plan of Care with patient: Yes  Updates/Grading for next session:    Student:     " I certify that I was present in the room directing the student in service delivery and guiding them using my skilled judgement. As the co-signing therapist, I have reviewed the student's documentation and am responsible for the treatment, assessment and plan."    Therapist: TEA Kaufman OT   "

## 2018-06-22 ENCOUNTER — CLINICAL SUPPORT (OUTPATIENT)
Dept: REHABILITATION | Facility: HOSPITAL | Age: 68
End: 2018-06-22
Payer: MEDICARE

## 2018-06-22 DIAGNOSIS — M79.645 THUMB PAIN, LEFT: ICD-10-CM

## 2018-06-22 PROCEDURE — 97140 MANUAL THERAPY 1/> REGIONS: CPT | Mod: PO

## 2018-06-22 PROCEDURE — 97110 THERAPEUTIC EXERCISES: CPT | Mod: PO

## 2018-06-22 PROCEDURE — 97035 APP MDLTY 1+ULTRASOUND EA 15: CPT | Mod: PO

## 2018-06-22 PROCEDURE — 97018 PARAFFIN BATH THERAPY: CPT | Mod: PO,59

## 2018-06-22 NOTE — PROGRESS NOTES
"                            Occupational Therapy Daily Treatment Note     Name: Marshall Almanza  Clinic Number: [id number]    Therapy Diagnosis:   Encounter Diagnosis   Name Primary?    Thumb pain, left      Physician: Polo Griffin MD    Physician Orders: eval and treat  Medical Diagnosis: CMC arthritis of L hand  Evaluation Date: 6/8/18  Insurance Authorization period Expiration: 12/31/18  Plan of Care Certification Period: 7/23/18  Date of Return to MD: none as of yet     Visit #: 5 of 20  Time In: 8:00  Time Out: 8:55  Total Billable Time: 55 minutes      Precautions:  Standard    Subjective     Pt reports: she was compliant with home exercise program and home modalities. Pt reported pain, but said it's still reduced due to injection earlier in the week.  Pt sleeping with orthosis. "I can lift my thumb without it shaking now."  Response to previous treatment:   Pain:  9/10 (depending on movement and if pressure is applied to CMC); 0/10 at rest  Location: left thumb pain at CMC and left 1st dorsal compartment     Objective     Marshall received the following supervised modalities after being cleared for contradictions for 8 minutes:   -Patient received paraffin bath to L hand for 8 minutes to increase blood flow and circulation prior to therex.   - Pt received cold pack to L hand x 5 minutes after exercises to reduce pain and swelling.    Marshall received the following direct contact modalities after being cleared for contraindications for 8 minutes:  -Patient received ultrasound to L 1st dorsal compartment area for tissue elasticity and pain management for 8 minutes @ 3.3 Mhz, Intensity 0.6 w/cm2 at 20% duty cycle.     Marshall received the following manual therapy techniques for 10 minutes:   -Pt performed PROM of L thumb CMC coupled with grade 1 and 2 mobilization and distraction for pain relief.     Marshall received therapeutic exercises for 20 minutes including:  -Pt performed L thumb MCP flexion, MP " extension, palmar abduction, and radial abduction in fluidotherapy for 2 minutes of continuous motion for each movement. (omitted radial abduction today)  -Pt performed lateral pinch as well as 3pt pinch exercise with yellow theraputty for 2 min.  -Pt was instructed on and participated in the following therapeutic exercises for CMC surrounding musculature and pain: IP blocking x 30 reps, isometric hold first dorsal interossei x 10 reps with 10 second holds, pain free.     Home Exercises and Education Provided     Education provided:   - Progress towards goals     Written Home Exercises Provided: continue current HEP.      See EMR under patient instructions for exercises given.     Marshall demonstrated good  understanding of the education provided.     Assessment     Pt arrived with orthosis in her purse and left wearing it. Pt presented with swelling of L CMC area.  Avoiding thumb ABD  at this time , she is aware. Cont pain with UD/RD.  Educated pt on proper heat and cold pack use. Pt would continue to benefit from skilled OT.  Marshall is progressing well towards her goals and there are no updates to goals at this time. Pt prognosis is Good. Pt will continue to benefit from skilled outpatient occupational therapy to address the deficits listed in the problem list on initial evalution provide pt/family education and to maximize pt's level of independence in the home and community environment.     Anticipated barriers to occupational therapy: degenerative joint condition  Pt's spiritual, cultural and educational needs considered and pt agreeable to plan of care and goals.    Goals:  Goals to be met in 6-8 weeks:     1) Patient to be IND with HEP and modalities for pain managment.  2)Pt will increase  strength 10-15 lbs. to improve functional grasp for ADLs/work/leisure activities.   3) Patient will decrease complaints of pain to 4out of 10 (with use) to increase functional hand use for ADL/work/leisure  "activities.   4) Pt will increase pinch strength in all 3 limited positions by 1-3 psi to assist with manipulation and fine motor task.  5) Pt's FOTO score to improve from 51% impaired to less than 40% impaired by 6-8 wks.       Plan     Discussed Plan of Care with patient: Yes  Updates/Grading for next session:    Student:     " I certify that I was present in the room directing the student in service delivery and guiding them using my skilled judgement. As the co-signing therapist, I have reviewed the student's documentation and am responsible for the treatment, assessment and plan."    Therapist: TEA Kaufman OT   "

## 2018-06-26 ENCOUNTER — CLINICAL SUPPORT (OUTPATIENT)
Dept: REHABILITATION | Facility: HOSPITAL | Age: 68
End: 2018-06-26
Payer: MEDICARE

## 2018-06-26 DIAGNOSIS — M79.645 THUMB PAIN, LEFT: ICD-10-CM

## 2018-06-26 PROCEDURE — 97035 APP MDLTY 1+ULTRASOUND EA 15: CPT | Mod: PO

## 2018-06-26 PROCEDURE — 97110 THERAPEUTIC EXERCISES: CPT | Mod: PO

## 2018-06-26 PROCEDURE — 97018 PARAFFIN BATH THERAPY: CPT | Mod: PO

## 2018-06-26 PROCEDURE — 97140 MANUAL THERAPY 1/> REGIONS: CPT | Mod: PO

## 2018-06-26 NOTE — PROGRESS NOTES
"                            Occupational Therapy Daily Treatment Note     Name: Marshall Almanza  Clinic Number: [id number]    Therapy Diagnosis:   Encounter Diagnosis   Name Primary?    Thumb pain, left      Physician: Polo Griffin MD    Physician Orders: eval and treat  Medical Diagnosis: CMC arthritis of L hand  Evaluation Date: 6/8/18  Insurance Authorization period Expiration: 12/31/18  Plan of Care Certification Period: 7/23/18  Date of Return to MD: none as of yet     Visit #: 6 of 20  Time In: 3:00  Time Out: 3:55  Total Billable Time: 55 minutes     Precautions:  Standard    Subjective     Pt reports: she was compliant with home exercise program and home modalities. Pt reported pain, but said it's still reduced due to injection. Pt noticed swelling has gone down since previous session. Pt sleeping with orthosis. "I can button myself now very slowly, but I couldn't do it before."  Response to previous treatment:   Pain:  7-8/10 (depending on movement and if pressure is applied to CMC); 0/10 at rest  Location: left thumb pain at CMC and left 1st dorsal compartment     Objective     Marshall received the following supervised modalities after being cleared for contradictions for 8 minutes:   -Patient received paraffin bath to L hand for 8 minutes to increase blood flow and circulation prior to therex.   - Pt received cold pack to L hand x 5 minutes after exercises to reduce pain and swelling.    Marshall received the following direct contact modalities after being cleared for contraindications for 8 minutes:  -Patient received ultrasound to L 1st dorsal compartment area for tissue elasticity and pain management for 8 minutes @ 3.3 Mhz, Intensity 0.6 w/cm2 at 20% duty cycle.     Marshall received the following manual therapy techniques for 10 minutes:   -Pt performed PROM of L thumb CMC coupled with grade 1 and 2 mobilization and distraction for pain relief.     Marshall received therapeutic exercises for 20 " minutes including:  -Pt performed L thumb MCP flexion, MP extension, palmar abduction, and radial abduction in fluidotherapy for 2 minutes of continuous motion for each movement. (omitted radial abduction today)  -Pt performed lateral pinch as well as 3pt pinch exercise with yellow theraputty for 2 min.  -Pt was instructed on and participated in the following therapeutic exercises for CMC surrounding musculature and pain: IP blocking x 30 reps, isometric hold first dorsal interossei x 10 reps with 10 second holds, pain free.     Home Exercises and Education Provided     Education provided:   - Progress towards goals     Written Home Exercises Provided: continue current HEP.      See EMR under patient instructions for exercises given.     Marshall demonstrated good  understanding of the education provided.     Assessment     Pt presented with less swelling of L CMC area.  Pt said she could button now with less pain than before. Pt reported pain moving the thumb during 3 pt pinch putty exercise. Pt tolerating AROM thumb extension well. Pt tolerated passive thumb ABD. Avoiding active thumb ABD at this time, she is aware. Cont pain with UD/RD. Pt would continue to benefit from skilled OT.  Marshall is progressing well towards her goals and there are no updates to goals at this time. Pt prognosis is Good. Pt will continue to benefit from skilled outpatient occupational therapy to address the deficits listed in the problem list on initial evaluation, provide pt education, and to maximize pt's level of independence in the home and community environment.     Anticipated barriers to occupational therapy: degenerative joint condition  Pt's spiritual, cultural and educational needs considered and pt agreeable to plan of care and goals.    Goals:  Goals to be met in 6-8 weeks:     1) Patient to be IND with HEP and modalities for pain managment.  2)Pt will increase  strength 10-15 lbs. to improve functional grasp for  ADLs/work/leisure activities.   3) Patient will decrease complaints of pain to 4out of 10 (with use) to increase functional hand use for ADL/work/leisure activities.   4) Pt will increase pinch strength in all 3 limited positions by 1-3 psi to assist with manipulation and fine motor task.  5) Pt's FOTO score to improve from 51% impaired to less than 40% impaired by 6-8 wks.       Plan     Discussed Plan of Care with patient: Yes  Updates/Grading for next session: N/A

## 2018-06-28 NOTE — PROGRESS NOTES
Occupational Therapy Daily Treatment Note     Name: Marshall Almanza  Clinic Number: [id number]    Therapy Diagnosis:   Encounter Diagnosis   Name Primary?    Thumb pain, left      Physician: Polo Griffin MD    Physician Orders: eval and treat  Medical Diagnosis: CMC arthritis of L hand  Evaluation Date: 6/8/18  Insurance Authorization period Expiration: 12/31/18  Plan of Care Certification Period: 7/23/18  Date of Return to MD: none as of yet     Visit #: 7 of 20  Time In: 10:00  Time Out: 10:55  Total Billable Time: 55 minutes     Precautions:  Standard    Subjective     Pt reports: she was compliant with home exercise program and home modalities. Pt sleeping with orthosis. Pt reported less pain than before due to injection. Pt performing PROM CMC of L thumb distractions at home to relieve pain.  Response to previous treatment: positive   Pain:  6-7/10 (depending on movement and if pressure is applied to CMC); 0/10 at rest  Location: left thumb pain at CMC and left 1st dorsal compartment     Objective     Marshall received the following supervised modalities after being cleared for contradictions for 8 minutes:   -Patient received paraffin bath to L hand for 8 minutes to increase blood flow and circulation prior to therex.     Marshall received the following direct contact modalities after being cleared for contraindications for 8 minutes:  -Patient received ultrasound to L 1st dorsal compartment area for tissue elasticity and pain management for 8 minutes @ 3.3 Mhz, Intensity 0.6 w/cm2 at 20% duty cycle.     Marshall received the following manual therapy techniques for 10 minutes:   -Pt performed PROM of L thumb CMC coupled with grade 1 and 2 mobilization and distraction for pain relief.     Marshall received therapeutic exercises for 20 minutes including:  -Pt performed L thumb MCP flexion, MP extension, palmar abduction, and radial abduction in fluidotherapy for 2 minutes of  continuous motion for each movement. (omitted radial abduction today)  -Pt performed lateral pinch as well as 3pt pinch exercise with yellow theraputty for 2 min. each  -Pt was instructed on and participated in the following therapeutic exercises for CMC surrounding musculature and pain: IP blocking x 30 reps, isometric hold first dorsal interossei x 10 reps with 10 second holds, pain free.     Home Exercises and Education Provided     Education provided:   - Progress towards goals; education about medical condition; education about avoiding pain during HEP     Written Home Exercises Provided: continue current HEP.      See EMR under patient instructions for exercises given.     Marshall demonstrated good  understanding of the education provided.     Assessment     Pt reported performing PROM of CMC of L thumb at home to relieve pain; pt encouraged to continue. Avoiding active thumb ABD and ADD  at this time, she is aware. Cont pain with UD/RD. Pt reported slight pain while performing palmar abduction in fluido. Pt reported slight pain when pushing down too hard during pinch putty exercises; pt instructed to perform gentler pinches. Pt would continue to benefit from skilled OT.  Marshall is progressing well towards her goals and there are no updates to goals at this time. Pt prognosis is Good. Pt will continue to benefit from skilled outpatient occupational therapy to address the deficits listed in the problem list on initial evaluation, provide pt education, and to maximize pt's level of independence in the home and community environment.     Anticipated barriers to occupational therapy: degenerative joint condition  Pt's spiritual, cultural and educational needs considered and pt agreeable to plan of care and goals.    Goals:  Goals to be met in 6-8 weeks:     1) Patient to be IND with HEP and modalities for pain managment.  2)Pt will increase  strength 10-15 lbs. to improve functional grasp for  ADLs/work/leisure activities.   3) Patient will decrease complaints of pain to 4out of 10 (with use) to increase functional hand use for ADL/work/leisure activities.   4) Pt will increase pinch strength in all 3 limited positions by 1-3 psi to assist with manipulation and fine motor task.  5) Pt's FOTO score to improve from 51% impaired to less than 40% impaired by 6-8 wks.       Plan     Discussed Plan of Care with patient: Yes  Updates/Grading for next session: N/A

## 2018-06-29 ENCOUNTER — CLINICAL SUPPORT (OUTPATIENT)
Dept: REHABILITATION | Facility: HOSPITAL | Age: 68
End: 2018-06-29
Payer: MEDICARE

## 2018-06-29 DIAGNOSIS — M79.645 THUMB PAIN, LEFT: ICD-10-CM

## 2018-06-29 PROCEDURE — 97140 MANUAL THERAPY 1/> REGIONS: CPT | Mod: PO

## 2018-06-29 PROCEDURE — 97110 THERAPEUTIC EXERCISES: CPT | Mod: PO

## 2018-06-29 PROCEDURE — 97018 PARAFFIN BATH THERAPY: CPT | Mod: PO,59

## 2018-06-29 PROCEDURE — 97035 APP MDLTY 1+ULTRASOUND EA 15: CPT | Mod: PO

## 2018-07-03 ENCOUNTER — CLINICAL SUPPORT (OUTPATIENT)
Dept: REHABILITATION | Facility: HOSPITAL | Age: 68
End: 2018-07-03
Payer: MEDICARE

## 2018-07-03 DIAGNOSIS — M79.645 THUMB PAIN, LEFT: ICD-10-CM

## 2018-07-03 PROCEDURE — 97140 MANUAL THERAPY 1/> REGIONS: CPT | Mod: PO

## 2018-07-03 PROCEDURE — 97018 PARAFFIN BATH THERAPY: CPT | Mod: PO

## 2018-07-03 PROCEDURE — 97035 APP MDLTY 1+ULTRASOUND EA 15: CPT | Mod: PO

## 2018-07-03 PROCEDURE — 97110 THERAPEUTIC EXERCISES: CPT | Mod: PO

## 2018-07-03 NOTE — PROGRESS NOTES
Occupational Therapy Daily Treatment Note     Name: Marshall Almanza  Clinic Number: [id number]    Therapy Diagnosis:   Encounter Diagnosis   Name Primary?    Thumb pain, left      Physician: Polo Griffin MD    Physician Orders: eval and treat  Medical Diagnosis: CMC arthritis of L hand  Evaluation Date: 6/8/18  Insurance Authorization period Expiration: 12/31/18  Plan of Care Certification Period: 7/23/18  Date of Return to MD: none as of yet     Visit #: 8 of 20  Time In: 10:00  Time Out: 10:55  Total Billable Time:  55 minutes     Precautions:  Standard    Subjective     Pt reports: she was compliant with home exercise program and home modalities. Pt sleeping with orthosis. Pt reported similar pain compared to previous session and that injection helped. Pt performing PROM CMC of L thumb distractions at home to relieve pain.  Response to previous treatment: positive - pain not increased but not decreased  Pain:  6-7/10 (depending on movement and if pressure is applied to CMC); 0/10 at rest  Location: left thumb pain at CMC and left 1st dorsal compartment     Objective     Marshall received the following supervised modalities after being cleared for contradictions for 8 minutes:   -Patient received paraffin bath to L hand for 8 minutes to increase blood flow and circulation prior to therex.     Marshall received the following direct contact modalities after being cleared for contraindications for 8 minutes:  -Patient received ultrasound to L 1st dorsal compartment area for tissue elasticity and pain management for 8 minutes @ 3.3 Mhz, Intensity 0.6 w/cm2 at 20% duty cycle.     Marshall received the following manual therapy techniques for 10 minutes:   -Pt performed PROM of L thumb CMC coupled with grade 1 and 2 mobilization and distraction for pain relief.     Marshall received therapeutic exercises for 20 minutes including:  -Pt performed L thumb MCP flexion, MP extension, and palmar  abduction in fluidotherapy for 2 minutes of continuous motion for each movement.   -Pt performed AROM radial abduction exercise in gravity assist x 30 reps.  -Pt performed lateral pinch as well as 3pt pinch exercise with yellow theraputty for 2 min. each  -Pt was instructed on and participated in the following therapeutic exercises for CMC surrounding musculature and pain: IP blocking x 30 reps, isometric hold first dorsal interossei x 10 reps with 10 second holds, pain free.     Home Exercises and Education Provided     Education provided:   - Progress towards goals; education about skin protection while using ice; education about avoiding pain during HEP     Written Home Exercises Provided: continue current HEP.      See EMR under patient instructions for exercises given.     Marshall demonstrated good  understanding of the education provided.     Assessment     Pt reported similar pain this session compared to previous session. Pt tolerated session well and was able to perform 30 reps of AROM radial abduction in gravity assist; pt reported slight pain during first few reps which then subsided. Pt instructed only to perform radial abduction exercise either in AAROM with other hand or in gravity assist position; pt instructed not to perform AROM radial abduction against gravity. This is to maintain radial abduction ROM of L thumb without pain. Cont pain with UD/RD. Pt reported she has to perform 3 pt pinch putty home exercises gently to prevent pain, which was encouraged. Pt instructed to place bag of ice into a washcloth at home to prevent redness and burning sensation. Pt would continue to benefit from skilled OT.  Marshall is progressing well towards her goals and there are no updates to goals at this time. Pt prognosis is Good. Pt will continue to benefit from skilled outpatient occupational therapy to address the deficits listed in the problem list on initial evaluation, provide pt education, and to maximize  pt's level of independence in the home and community environment.     Anticipated barriers to occupational therapy: degenerative joint condition  Pt's spiritual, cultural and educational needs considered and pt agreeable to plan of care and goals.    Goals:  Goals to be met in 6-8 weeks:     1) Patient to be IND with HEP and modalities for pain managment.  2)Pt will increase  strength 10-15 lbs. to improve functional grasp for ADLs/work/leisure activities.   3) Patient will decrease complaints of pain to 4out of 10 (with use) to increase functional hand use for ADL/work/leisure activities.   4) Pt will increase pinch strength in all 3 limited positions by 1-3 psi to assist with manipulation and fine motor task.  5) Pt's FOTO score to improve from 51% impaired to less than 40% impaired by 6-8 wks.       Plan   Marshall to be treated by Occupational Therapy  1-2  times per week for 6-8 weeks to achieve the established goals.   Discussed Plan of Care with patient: Yes  Updates/Grading for next session: N/A

## 2018-07-05 NOTE — PROGRESS NOTES
Occupational Therapy Daily Treatment Note     Name: Marshall Almanza  Clinic Number: [id number]    Therapy Diagnosis:   Encounter Diagnosis   Name Primary?    Thumb pain, left Yes     Physician: Polo Griffin MD    Physician Orders: eval and treat  Medical Diagnosis: CMC arthritis of L hand  Evaluation Date: 6/8/18  Insurance Authorization period Expiration: 12/31/18  Plan of Care Certification Period: 7/23/18  Date of Return to MD: none as of yet     Visit #: 9 of 20  Time In: 10:05  Time Out: 11:00  Total Billable Time:  60 minutes     Precautions:  Standard    Subjective     Pt reports: she was compliant with home exercise program and home modalities. Pt sleeping with orthosis. Pt reported none of the exercises have caused pain. Pt reported performing AROM radial abduction exercise in gravity assist at home with no problems. Pt reported placing ice in a wash cloth has helped prevent burning sensation on skin.  Response to previous treatment: positive - pain not increased but not decreased  Pain:  10 (depending on movement and if pressure is applied to CMC); 0/10 at rest  Location: left thumb pain at CMC and left 1st dorsal compartment     Objective     Marshall received the following supervised modalities after being cleared for contradictions for 8 minutes:   -Patient received paraffin bath to L hand for 8 minutes to increase blood flow and circulation prior to therex.     Marshall received the following direct contact modalities after being cleared for contraindications for 8 minutes:  -Patient received ultrasound to L 1st dorsal compartment area for tissue elasticity and pain management for 8 minutes @ 3.3 Mhz, Intensity 0.6 w/cm2 at 20% duty cycle.     Marshall received the following manual therapy techniques for 10 minutes:   -Pt performed PROM of L thumb CMC coupled with grade 1 and 2 mobilization and distraction for pain relief.     Marshall received therapeutic exercises for 20  minutes including:  -Pt performed L thumb MCP flexion, MP extension, and palmar abduction in fluidotherapy for 2 minutes of continuous motion for each movement.   -Pt performed AROM radial abduction exercise in gravity assist x 30 reps.  -Pt performed lateral pinch as well as 3pt pinch exercise with yellow theraputty for 2 min. each  -Pt was instructed on and participated in the following therapeutic exercises for CMC surrounding musculature and pain: IP blocking x 30 reps, isometric hold first dorsal interossei x 10 reps with 10 second holds, pain free.     Home Exercises and Education Provided     Education provided:   - Progress towards goals; education about avoiding pain during HEP     Written Home Exercises Provided: continue current HEP.      See EMR under patient instructions for exercises given.     Marshall demonstrated good  understanding of the education provided.     Assessment     Pt reported performing AROM radial abduction with gravity assist at home without pain. Pt continues to be limited with thumb ABD. Marshall is progressing well towards her goals and there are no updates to goals at this time. Pt prognosis is Good. Pt will continue to benefit from skilled outpatient occupational therapy to address the deficits listed in the problem list on initial evaluation, provide pt education, and to maximize pt's level of independence in the home and community environment.     Anticipated barriers to occupational therapy: degenerative joint condition  Pt's spiritual, cultural and educational needs considered and pt agreeable to plan of care and goals.    Goals:  Goals to be met in 6-8 weeks:     1) Patient to be IND with HEP and modalities for pain managment.  2)Pt will increase  strength 10-15 lbs. to improve functional grasp for ADLs/work/leisure activities.   3) Patient will decrease complaints of pain to 4out of 10 (with use) to increase functional hand use for ADL/work/leisure activities.   4) Pt  will increase pinch strength in all 3 limited positions by 1-3 psi to assist with manipulation and fine motor task.  5) Pt's FOTO score to improve from 51% impaired to less than 40% impaired by 6-8 wks.       Plan   Marshall to be treated by Occupational Therapy  1-2  times per week for 6-8 weeks to achieve the established goals.   Discussed Plan of Care with patient: Yes  Updates/Grading for next session: N/A

## 2018-07-06 ENCOUNTER — CLINICAL SUPPORT (OUTPATIENT)
Dept: REHABILITATION | Facility: HOSPITAL | Age: 68
End: 2018-07-06
Payer: MEDICARE

## 2018-07-06 DIAGNOSIS — M79.645 THUMB PAIN, LEFT: Primary | ICD-10-CM

## 2018-07-06 PROBLEM — G89.29 CHRONIC PAIN OF LEFT THUMB: Status: ACTIVE | Noted: 2018-06-08

## 2018-07-06 PROCEDURE — 97110 THERAPEUTIC EXERCISES: CPT | Mod: PO

## 2018-07-06 PROCEDURE — 97018 PARAFFIN BATH THERAPY: CPT | Mod: PO

## 2018-07-06 PROCEDURE — 97035 APP MDLTY 1+ULTRASOUND EA 15: CPT | Mod: PO

## 2018-07-10 ENCOUNTER — CLINICAL SUPPORT (OUTPATIENT)
Dept: REHABILITATION | Facility: HOSPITAL | Age: 68
End: 2018-07-10
Payer: MEDICARE

## 2018-07-10 DIAGNOSIS — G89.29 CHRONIC PAIN OF LEFT THUMB: Primary | ICD-10-CM

## 2018-07-10 DIAGNOSIS — M79.645 CHRONIC PAIN OF LEFT THUMB: Primary | ICD-10-CM

## 2018-07-10 PROCEDURE — 97140 MANUAL THERAPY 1/> REGIONS: CPT | Mod: PO

## 2018-07-10 PROCEDURE — 97035 APP MDLTY 1+ULTRASOUND EA 15: CPT | Mod: PO

## 2018-07-10 PROCEDURE — 97110 THERAPEUTIC EXERCISES: CPT | Mod: PO

## 2018-07-10 NOTE — PROGRESS NOTES
Occupational Therapy Daily Treatment Note     Name: Marshall Almanza  Clinic Number: [id number]    Therapy Diagnosis:   Encounter Diagnosis   Name Primary?    Chronic pain of left thumb Yes     Physician: Polo Griffin MD    Physician Orders: eval and treat  Medical Diagnosis: CMC arthritis of L hand  Evaluation Date: 6/8/18  Insurance Authorization period Expiration: 12/31/18  Plan of Care Certification Period: 7/23/18  Date of Return to MD: none as of yet     Visit #: 10 of 20  Time In: 10:00  Time Out: 11:00  Total Billable Time:  60 minutes     Precautions:  Standard    Subjective     Pt reports: she was compliant with home exercise program and home modalities. Pt sleeping with orthosis. Pt reported none of the exercises have caused pain. Pt reported performing AROM radial abduction exercise in gravity assist at home with no problems. Pt reported placing ice in a wash cloth has helped prevent burning sensation on skin.  Response to previous treatment: positive - pain not increased but not decreased  Pain:  10 (depending on movement and if pressure is applied to CMC); 0/10 at rest  Location: left thumb pain at CMC and left 1st dorsal compartment     Objective     Marshall received the following supervised modalities after being cleared for contradictions for 8 minutes:   -Patient received paraffin bath to L hand for 8 minutes to increase blood flow and circulation prior to therex.     Marshall received the following direct contact modalities after being cleared for contraindications for 8 minutes:  -Patient received ultrasound to L 1st dorsal compartment area for tissue elasticity and pain management for 8 minutes @ 3.3 Mhz, Intensity 0.6 w/cm2 at 20% duty cycle.     Marshall received the following manual therapy techniques for 10 minutes:   -Pt performed PROM of L thumb CMC coupled with grade 1 and 2 mobilization and distraction for pain relief.     Marshall received therapeutic  exercises for 20 minutes including:  -Pt performed L thumb MCP flexion, MP extension, and palmar abduction in fluidotherapy for 2 minutes of continuous motion for each movement.   -Pt performed AROM radial abduction exercise in gravity assist x 30 reps.  -Pt performed lateral pinch as well as 3pt pinch exercise with yellow theraputty for 2 min. each  -Pt was instructed on and participated in the following therapeutic exercises for CMC surrounding musculature and pain: IP blocking x 30 reps, isometric hold first dorsal interossei x 10 reps with 10 second holds, pain free.   - Pt received bag of ice on R CMC x 5 min after exercises to reduce pain and inflammation     Home Exercises and Education Provided     Education provided:   - Progress towards goals; education about avoiding pain during HEP     Written Home Exercises Provided: continue current HEP.      See EMR under patient instructions for exercises given.     Marshall demonstrated good  understanding of the education provided.     Assessment     Pt reported similar pain from previous session. Pt continues to be limited with thumb ABD. Marshall is progressing well towards her goals and there are no updates to goals at this time. Pt prognosis is Good. Pt will continue to benefit from skilled outpatient occupational therapy to address the deficits listed in the problem list on initial evaluation, provide pt education, and to maximize pt's level of independence in the home and community environment.     Anticipated barriers to occupational therapy: degenerative joint condition  Pt's spiritual, cultural and educational needs considered and pt agreeable to plan of care and goals.    Goals:  Goals to be met in 6-8 weeks:     1) Patient to be IND with HEP and modalities for pain managment.  2)Pt will increase  strength 10-15 lbs. to improve functional grasp for ADLs/work/leisure activities.   3) Patient will decrease complaints of pain to 4out of 10 (with use) to  increase functional hand use for ADL/work/leisure activities.   4) Pt will increase pinch strength in all 3 limited positions by 1-3 psi to assist with manipulation and fine motor task.  5) Pt's FOTO score to improve from 51% impaired to less than 40% impaired by 6-8 wks.       Plan   Marshall to be treated by Occupational Therapy  1-2  times per from  5-09-17  to 7-09-17        Discussed Plan of Care with patient: Yes  Updates/Grading for next session: N/A

## 2018-07-13 ENCOUNTER — CLINICAL SUPPORT (OUTPATIENT)
Dept: REHABILITATION | Facility: HOSPITAL | Age: 68
End: 2018-07-13
Payer: MEDICARE

## 2018-07-13 DIAGNOSIS — M79.645 CHRONIC PAIN OF LEFT THUMB: Primary | ICD-10-CM

## 2018-07-13 DIAGNOSIS — G89.29 CHRONIC PAIN OF LEFT THUMB: Primary | ICD-10-CM

## 2018-07-13 PROCEDURE — 97035 APP MDLTY 1+ULTRASOUND EA 15: CPT | Mod: PO

## 2018-07-13 PROCEDURE — 97018 PARAFFIN BATH THERAPY: CPT | Mod: PO

## 2018-07-13 PROCEDURE — 97110 THERAPEUTIC EXERCISES: CPT | Mod: PO

## 2018-07-13 NOTE — PROGRESS NOTES
Occupational Therapy Daily Treatment Note     Name: Marshall Almanza  Clinic Number: [id number]    Therapy Diagnosis:   Encounter Diagnosis   Name Primary?    Chronic pain of left thumb Yes     Physician: Polo Griffin MD    Physician Orders: eval and treat  Medical Diagnosis: CMC arthritis of L hand  Evaluation Date: 6/8/18  Insurance Authorization period Expiration: 12/31/18  Plan of Care Certification Period: 7/23/18  Date of Return to MD: none as of yet     Visit #: 11 of 20  Time In: 9:50  Time Out: 10:50  Total Billable Time:  60 minutes     Precautions:  Standard    Subjective     Pt reports: she was compliant with home exercise program and home modalities. Pt sleeping with orthosis. Pt reported none of the exercises have caused pain. Pt reported performing AROM radial abduction exercise in gravity assist at home with no problems. Pt reported placing ice in a wash cloth has helped prevent burning sensation on skin.  Response to previous treatment: positive - pain not increased but not decreased  Pain:  10 (depending on movement and if pressure is applied to CMC); 0/10 at rest  Location: left thumb pain at CMC and left 1st dorsal compartment     Objective     Marshall received the following supervised modalities after being cleared for contradictions for 8 minutes:   -Patient received paraffin bath to L hand for 8 minutes to increase blood flow and circulation prior to therex.     Marshall received the following direct contact modalities after being cleared for contraindications for 8 minutes:  -Patient received ultrasound to L 1st dorsal compartment area for tissue elasticity and pain management for 8 minutes @ 3.3 Mhz, Intensity 0.6 w/cm2 at 20% duty cycle.     The following measurements were taken:  Range of Motion:   Left Active    6/8/2018 7/13/2018   THUMB                            MP Flex 31 (pain with extension) 55 (+24)                         IP Flex 70 (pain with  extension) 65 (-5)                         Weiss ABD  NT 45                         Radial ABD NT  30                         Opposition Volar MP of RF pain free WNL( to volar MP of SF)          Wrist AROM is WNL    Manual Muscle Testing:         Radial Nerve Innervated:  EPL, APB 4/5; 3/5             Strength: (TRINI Dynamometer in lbs.) Average 3 trials, Position II:       7/13/2018 7/13/2018   Rung 2 Left Right   TRINI # 2 33# (+11) 45#         Pinch Strength (Measured in psi)       7/13//2018 7/13//2018     Left Right   Key Pinch 7 psi (+5) 12 psi   3pt Pinch 5 psi (+3.5) 5 psi   2pt Pinch 2 psi (+2) 4.5 psi      Outcome Measure: FOTO     Category: Self Care      Current Score  = 45% impaired  Goal at Discharge Score =  32% impaired CJ = at least 20% but < 40% impaired, limited or restricted    Score interpretation is as follows:   TEST SCORE  Modifier  Impairment Limitation Restriction    0%  CH  0 % impaired, limited or restricted    1-19%  CI  @ least 1% but less than 20% impaired, limited or restricted    20-39%  CJ  @ least 20%<40% impaired, limited or restricted    40-59%  CK  @ least 40%<60% impaired, limited or restricted    60-79%  CL  @ least 60% <80% impaired, limited or restricted    80-99%  CM  @ least 80%<100% impaired limited or restricted    100%  CN  100% impaired, limited or restricted            Home Exercises and Education Provided     Education provided:   - Progress towards goals; education about avoiding pain during HEP     Written Home Exercises Provided: continue current HEP.      See EMR under patient instructions for exercises given.     Marshall demonstrated good  understanding of the education provided.     Assessment   All goals met. Pt reported the exercises have helped increase her strength. Pt reported she can now open a door knob slowly, an improvement from not being able to open one before. Pt reported her pain has significantly decreased since the injection. Pt reported  AROM thumb radial abduction against gravity still hurts, which is expected with her condition. Pt's MP flexion, , and pinch strength have improved. Pt's IP flexion has decreased 5 degrees. Pt has reached maximal potential from skilled occupational therapy services and is being discharged at this time secondary to degenerative joint condition.     Goals met :       1) Patient to be IND with HEP and modalities for pain managment. (met)  2)Pt will increase  strength 10-15 lbs. to improve functional grasp for ADLs/work/leisure activities. (met)  3) Patient will decrease complaints of pain to 4out of 10 (with use) to increase functional hand use for ADL/work/leisure activities. (met)  4) Pt will increase pinch strength in all 3 limited positions by 1-3 psi to assist with manipulation and fine motor task. (met)  5) Pt's FOTO score to improve from 51% impaired to less than 40% impaired by 6-8 wks. (not met)       Plan   Pt is a 68-year-old female referred to occupational therapy by Dr. Polo Griffin with a diagnosis of L CMC arthritis and a referral for eval and treat. Pt received initial evaluation on 6/8/2018 and returned for 11 treatment sessions. Pt didn't have any missed visits. Pt's treatment included paraffin bath, ultrasound, manual distraction and mobilization, and therex in fluidotherapy.  Pt has reached maximal occupational therapy potential and is being d/c at this time and has met all goals.

## 2018-08-16 ENCOUNTER — HOSPITAL ENCOUNTER (OUTPATIENT)
Dept: RADIOLOGY | Facility: HOSPITAL | Age: 68
Discharge: HOME | End: 2018-08-16
Attending: FAMILY MEDICINE
Payer: MEDICARE

## 2018-08-16 DIAGNOSIS — E04.1 THYROID NODULE: ICD-10-CM

## 2018-08-16 PROCEDURE — 76536 US EXAM OF HEAD AND NECK: CPT | Mod: TC

## 2018-08-16 PROCEDURE — 76536 US EXAM OF HEAD AND NECK: CPT | Mod: 26,,, | Performed by: RADIOLOGY

## 2018-08-27 ENCOUNTER — LAB VISIT (OUTPATIENT)
Dept: LAB | Facility: HOSPITAL | Age: 68
End: 2018-08-27
Attending: FAMILY MEDICINE
Payer: MEDICARE

## 2018-08-27 ENCOUNTER — OFFICE VISIT (OUTPATIENT)
Dept: FAMILY MEDICINE | Facility: CLINIC | Age: 68
End: 2018-08-27
Payer: MEDICARE

## 2018-08-27 VITALS
BODY MASS INDEX: 16.09 KG/M2 | WEIGHT: 96.56 LBS | DIASTOLIC BLOOD PRESSURE: 64 MMHG | SYSTOLIC BLOOD PRESSURE: 110 MMHG | TEMPERATURE: 98 F | HEART RATE: 84 BPM | HEIGHT: 65 IN

## 2018-08-27 DIAGNOSIS — R31.9 HEMATURIA, UNSPECIFIED TYPE: ICD-10-CM

## 2018-08-27 DIAGNOSIS — N89.8 VAGINAL DISCHARGE: ICD-10-CM

## 2018-08-27 DIAGNOSIS — K21.9 GASTROESOPHAGEAL REFLUX DISEASE, ESOPHAGITIS PRESENCE NOT SPECIFIED: ICD-10-CM

## 2018-08-27 DIAGNOSIS — R06.00 DYSPNEA, UNSPECIFIED TYPE: ICD-10-CM

## 2018-08-27 DIAGNOSIS — E78.5 HYPERLIPIDEMIA, UNSPECIFIED HYPERLIPIDEMIA TYPE: ICD-10-CM

## 2018-08-27 DIAGNOSIS — D64.9 ANEMIA, UNSPECIFIED TYPE: ICD-10-CM

## 2018-08-27 DIAGNOSIS — R53.83 FATIGUE, UNSPECIFIED TYPE: ICD-10-CM

## 2018-08-27 DIAGNOSIS — Z00.00 ANNUAL PHYSICAL EXAM: Primary | ICD-10-CM

## 2018-08-27 DIAGNOSIS — N39.0 URINARY TRACT INFECTION WITH HEMATURIA, SITE UNSPECIFIED: ICD-10-CM

## 2018-08-27 DIAGNOSIS — R93.3 ABNORMAL FINDINGS ON DIAGNOSTIC IMAGING OF OTHER PARTS OF DIGESTIVE TRACT: ICD-10-CM

## 2018-08-27 DIAGNOSIS — E46 PROTEIN-CALORIE MALNUTRITION, UNSPECIFIED SEVERITY: ICD-10-CM

## 2018-08-27 DIAGNOSIS — R31.9 URINARY TRACT INFECTION WITH HEMATURIA, SITE UNSPECIFIED: ICD-10-CM

## 2018-08-27 LAB
ALBUMIN SERPL BCP-MCNC: 4.3 G/DL
ALP SERPL-CCNC: 60 U/L
ALT SERPL W/O P-5'-P-CCNC: 32 U/L
ANION GAP SERPL CALC-SCNC: 8 MMOL/L
AST SERPL-CCNC: 34 U/L
BASOPHILS # BLD AUTO: 0.02 K/UL
BASOPHILS NFR BLD: 0.5 %
BILIRUB SERPL-MCNC: 0.8 MG/DL
BILIRUB SERPL-MCNC: NORMAL MG/DL
BLOOD URINE, POC: NORMAL
BUN SERPL-MCNC: 18 MG/DL
CALCIUM SERPL-MCNC: 9.7 MG/DL
CHLORIDE SERPL-SCNC: 101 MMOL/L
CHOLEST SERPL-MCNC: 243 MG/DL
CHOLEST/HDLC SERPL: 2.5 {RATIO}
CO2 SERPL-SCNC: 30 MMOL/L
COLOR, POC UA: YELLOW
CREAT SERPL-MCNC: 0.8 MG/DL
DIFFERENTIAL METHOD: ABNORMAL
EOSINOPHIL # BLD AUTO: 0 K/UL
EOSINOPHIL NFR BLD: 0 %
ERYTHROCYTE [DISTWIDTH] IN BLOOD BY AUTOMATED COUNT: 13.7 %
EST. GFR  (AFRICAN AMERICAN): >60 ML/MIN/1.73 M^2
EST. GFR  (NON AFRICAN AMERICAN): >60 ML/MIN/1.73 M^2
GLUCOSE SERPL-MCNC: 104 MG/DL
GLUCOSE UR QL STRIP: NORMAL
HCT VFR BLD AUTO: 42.7 %
HDLC SERPL-MCNC: 96 MG/DL
HDLC SERPL: 39.5 %
HGB BLD-MCNC: 13.4 G/DL
IMM GRANULOCYTES # BLD AUTO: 0.01 K/UL
IMM GRANULOCYTES NFR BLD AUTO: 0.3 %
KETONES UR QL STRIP: NORMAL
LDLC SERPL CALC-MCNC: 127 MG/DL
LEUKOCYTE ESTERASE URINE, POC: NORMAL
LYMPHOCYTES # BLD AUTO: 1.2 K/UL
LYMPHOCYTES NFR BLD: 29 %
MCH RBC QN AUTO: 30 PG
MCHC RBC AUTO-ENTMCNC: 31.4 G/DL
MCV RBC AUTO: 96 FL
MONOCYTES # BLD AUTO: 0.2 K/UL
MONOCYTES NFR BLD: 5.8 %
NEUTROPHILS # BLD AUTO: 2.6 K/UL
NEUTROPHILS NFR BLD: 64.4 %
NITRITE, POC UA: NORMAL
NONHDLC SERPL-MCNC: 147 MG/DL
NRBC BLD-RTO: 0 /100 WBC
PH, POC UA: 5
PLATELET # BLD AUTO: 234 K/UL
PMV BLD AUTO: 10.1 FL
POTASSIUM SERPL-SCNC: 4.5 MMOL/L
PROT SERPL-MCNC: 7.7 G/DL
PROTEIN, POC: NORMAL
RBC # BLD AUTO: 4.46 M/UL
SODIUM SERPL-SCNC: 139 MMOL/L
SPECIFIC GRAVITY, POC UA: 1.01
TRIGL SERPL-MCNC: 100 MG/DL
TSH SERPL DL<=0.005 MIU/L-ACNC: 1.88 UIU/ML
UROBILINOGEN, POC UA: NORMAL
VIT B12 SERPL-MCNC: 988 PG/ML
WBC # BLD AUTO: 4 K/UL

## 2018-08-27 PROCEDURE — 87077 CULTURE AEROBIC IDENTIFY: CPT

## 2018-08-27 PROCEDURE — 81002 URINALYSIS NONAUTO W/O SCOPE: CPT | Mod: S$GLB,,, | Performed by: FAMILY MEDICINE

## 2018-08-27 PROCEDURE — 84443 ASSAY THYROID STIM HORMONE: CPT

## 2018-08-27 PROCEDURE — 80053 COMPREHEN METABOLIC PANEL: CPT

## 2018-08-27 PROCEDURE — 87088 URINE BACTERIA CULTURE: CPT

## 2018-08-27 PROCEDURE — 82607 VITAMIN B-12: CPT

## 2018-08-27 PROCEDURE — 87086 URINE CULTURE/COLONY COUNT: CPT

## 2018-08-27 PROCEDURE — 99999 PR PBB SHADOW E&M-EST. PATIENT-LVL IV: CPT | Mod: PBBFAC,,, | Performed by: FAMILY MEDICINE

## 2018-08-27 PROCEDURE — 85025 COMPLETE CBC W/AUTO DIFF WBC: CPT

## 2018-08-27 PROCEDURE — 36415 COLL VENOUS BLD VENIPUNCTURE: CPT | Mod: PO

## 2018-08-27 PROCEDURE — 80061 LIPID PANEL: CPT

## 2018-08-27 PROCEDURE — 99214 OFFICE O/P EST MOD 30 MIN: CPT | Mod: 25,S$GLB,, | Performed by: FAMILY MEDICINE

## 2018-08-27 PROCEDURE — 87186 SC STD MICRODIL/AGAR DIL: CPT

## 2018-08-27 RX ORDER — NITROFURANTOIN (MACROCRYSTALS) 100 MG/1
100 CAPSULE ORAL EVERY 12 HOURS
Qty: 20 CAPSULE | Refills: 0 | Status: SHIPPED | OUTPATIENT
Start: 2018-08-27 | End: 2018-12-04 | Stop reason: ALTCHOICE

## 2018-08-27 NOTE — PROGRESS NOTES
Subjective:       Patient ID: Marshall Almanza is a 68 y.o. female.    Chief Complaint: Thyroid Problem (follow up)  pt has fatigue , malaise. Occasional shortnesss of breath  Still having intermittant vaginal discharge possible urinary problem  HPIsee above  Review of Systems   Constitutional: Positive for fatigue.   HENT: Negative.    Eyes: Negative.    Respiratory: Positive for shortness of breath.    Cardiovascular: Negative.    Endocrine: Negative.    Genitourinary: Positive for vaginal discharge. Negative for hematuria.   Musculoskeletal: Negative.    Skin: Negative.    Allergic/Immunologic: Negative.    Neurological: Negative.    Psychiatric/Behavioral: Negative.        Objective:      Physical Exam   Constitutional: She is oriented to person, place, and time. She appears well-developed and well-nourished. No distress.   HENT:   Head: Normocephalic and atraumatic.   Right Ear: External ear normal.   Left Ear: External ear normal.   Nose: Nose normal.   Mouth/Throat: Oropharynx is clear and moist. No oropharyngeal exudate.   Eyes: Conjunctivae and EOM are normal. Pupils are equal, round, and reactive to light.   Neck: Normal range of motion. Neck supple. No JVD present.   Cardiovascular: Normal rate, regular rhythm, normal heart sounds and intact distal pulses.   Pulmonary/Chest: Effort normal and breath sounds normal.   Musculoskeletal: Normal range of motion.   Neurological: She is alert and oriented to person, place, and time. She displays normal reflexes. No cranial nerve deficit or sensory deficit. She exhibits normal muscle tone. Coordination normal.   Skin: Skin is warm and dry. No rash noted. She is not diaphoretic. No erythema. No pallor.   Psychiatric: She has a normal mood and affect. Her behavior is normal. Judgment and thought content normal.   Nursing note and vitals reviewed.      Assessment:       1. F/u thyroid test   2. Fatigue, unspecified type    3. Anemia, unspecified type    4. Dyspnea,  unspecified type    5. Vaginal discharge    6. Hyperlipidemia, unspecified hyperlipidemia type        Plan:     see today's orders:  Ambulatory referral to Obstetrics / Gynecology          CBC auto differential         Comprehensive metabolic panel         Lipid panel         POCT URINE DIPSTICK WITHOUT MICROSCOPE         TSH         Urine culture         Vitamin B12             See med card dated 8-27-18  alendronate (FOSAMAX) 70 MG tablet          esomeprazole (NEXIUM) 40 MG capsule 40 mg, Daily        multivitamin (THERAGRAN) per tablet 1 tablet, Daily        nitrofurantoin (MACRODANTIN) 100 MG capsule 100 mg, Every 12 hours        ranitidine (ZANTAC) 150 MG tablet 150 mg, 2 times

## 2018-08-29 ENCOUNTER — OFFICE VISIT (OUTPATIENT)
Dept: OBSTETRICS AND GYNECOLOGY | Facility: CLINIC | Age: 68
End: 2018-08-29
Payer: MEDICARE

## 2018-08-29 VITALS
BODY MASS INDEX: 16.01 KG/M2 | DIASTOLIC BLOOD PRESSURE: 70 MMHG | WEIGHT: 96.13 LBS | HEIGHT: 65 IN | SYSTOLIC BLOOD PRESSURE: 102 MMHG

## 2018-08-29 DIAGNOSIS — N95.2 POSTMENOPAUSAL ATROPHIC VAGINITIS: ICD-10-CM

## 2018-08-29 DIAGNOSIS — N89.8 VAGINAL DISCHARGE: Primary | ICD-10-CM

## 2018-08-29 LAB — BACTERIA UR CULT: NORMAL

## 2018-08-29 PROCEDURE — 99203 OFFICE O/P NEW LOW 30 MIN: CPT | Mod: S$GLB,,, | Performed by: OBSTETRICS & GYNECOLOGY

## 2018-08-29 PROCEDURE — 99999 PR PBB SHADOW E&M-EST. PATIENT-LVL III: CPT | Mod: PBBFAC,,, | Performed by: OBSTETRICS & GYNECOLOGY

## 2018-08-29 PROCEDURE — 87660 TRICHOMONAS VAGIN DIR PROBE: CPT

## 2018-08-29 RX ORDER — ESTRADIOL 0.1 MG/G
1 CREAM VAGINAL
Qty: 42.5 G | Refills: 1 | Status: SHIPPED | OUTPATIENT
Start: 2018-08-30 | End: 2018-12-04

## 2018-08-29 NOTE — LETTER
August 29, 2018      Michelle Bravo MD  [address] [facility]  [address]  [facility] [address]           [facility] OB/[facility]N 5th Floor  [address]  [facility] [address]  Phone: [phone]          Patient: Marshall Almanza   MR Number: [id number]   YOB: 1950   Date of Visit: 8/29/2018       Dear Dr. Michelle Bravo:    Thank you for referring Marshall Almanza to me for evaluation. Attached you will find relevant portions of my assessment and plan of care.    If you have questions, please do not hesitate to call me. I look forward to following Marshall Almanza along with you.    Sincerely,    Elma Swann MD    Enclosure  CC:  No Recipients    If you would like to receive this communication electronically, please contact [email or web address] or [phone] to request more information on [employer] Link access.    For providers and/or their staff who would like to refer a patient to [facility], please contact us through our one-stop-shop provider referral line, [facility], [place], at [phone].    If you feel you have received this communication in error or would no longer like to receive these types of communications, please e-mail [email or web address]

## 2018-08-29 NOTE — PROGRESS NOTES
Marshall Almanza is a 68 y.o. female patient   New to me who presents today with complaints of a yellowish vaginal discharge which has been going on for several weeks. She was treated for a UTI by her PCP in early , but the vaginal discharge has continued. She had another urine culture done 2 days ago, but final results are not yet back and she is reluctant to start the medication (Macrobid) that was prescribed. Denies urinary urgency and frequency.,    Patient's last menstrual period was 1998.    Past Medical History:   Diagnosis Date    Multiple thyroid nodules     Osteoporosis     Reflux     Senile nuclear sclerosis - Both Eyes 10/1/2013     History reviewed. No pertinent surgical history.  Social History     Socioeconomic History    Marital status: Single     Spouse name: Not on file    Number of children: 0    Years of education: Not on file    Highest education level: Not on file   Social Needs    Financial resource strain: Not on file    Food insecurity - worry: Not on file    Food insecurity - inability: Not on file    Transportation needs - medical: Not on file    Transportation needs - non-medical: Not on file   Occupational History    Occupation:      Employer: [employer]   Tobacco Use    Smoking status: Never Smoker    Smokeless tobacco: Never Used   Substance and Sexual Activity    Alcohol use: No    Drug use: No    Sexual activity: Not Currently   Other Topics Concern    Not on file   Social History Narrative    Not on file     Family History   Problem Relation Age of Onset    Cataracts Mother     Cancer Father         leukemia    Amblyopia Neg Hx     Blindness Neg Hx     Diabetes Neg Hx     Glaucoma Neg Hx     Hypertension Neg Hx     Macular degeneration Neg Hx     Retinal detachment Neg Hx     Strabismus Neg Hx     Stroke Neg Hx     Thyroid disease Neg Hx     Breast cancer Neg Hx     Colon cancer Neg Hx     Ovarian  "cancer Neg Hx      OB History      Para Term  AB Living    0 0 0 0 0 0    SAB TAB Ectopic Multiple Live Births    0 0 0 0 0                ROS:  GENERAL: Feeling well overall.   SKIN: Denies rash or lesions.   HEAD: Denies head injury or headache.   NODES: Denies enlarged lymph nodes.   CHEST: Denies chest pain or shortness of breath.   CARDIOVASCULAR: Denies palpitations or left sided chest pain.   ABDOMEN: No abdominal pain, nausea, vomiting or rectal bleeding.   URINARY: No dysuria, hematuria, or burning on urination.  REPRODUCTIVE: See HPI.   BREASTS: Denies pain, lumps, or nipple discharge.   HEMATOLOGIC: No easy bruisability or excessive bleeding.   MUSCULOSKELETAL: Denies joint pain or swelling.   NEUROLOGIC: Denies syncope or weakness.   PSYCHIATRIC: Denies depression.    /70   Ht 5' 4.5" (1.638 m)   Wt 43.6 kg (96 lb 1.9 oz)   LMP 1998   BMI 16.24 kg/m²     PE:   APPEARANCE: Well nourished, well developed, in no acute distress.  ABDOMEN: Soft. No tenderness or masses. No hernias. No CVA tenderness.  VULVA: No lesions. Normal female genitalia.  URETHRAL MEATUS: Normal size and location, no lesions, no prolapse.  URETHRA: No masses, tenderness, prolapse or scarring.  VAGINA: DRY and POORLY rugated, COPIOUS YELLOW GREEN discharge C/W atrophy, no significant cystocele or rectocele.  CERVIX: No lesions and discharge. NULLIPAROUS APPEARING   UTERUS: Normal size, regular shape, mobile, non-tender, bladder base nontender.  ADNEXA: No masses, tenderness or CDS nodularity.  ANUS PERINEUM: Normal.    PROCEDURES:    1. Vaginal discharge  Vaginosis Screen by DNA Probe   2. Postmenopausal atrophic vaginitis  estradiol (ESTRACE) 0.01 % (0.1 mg/gram) vaginal cream    AND PLAN:      Follow-up if symptoms worsen or fail to improve.    "

## 2018-08-30 LAB
CANDIDA RRNA VAG QL PROBE: NEGATIVE
G VAGINALIS RRNA GENITAL QL PROBE: NEGATIVE
T VAGINALIS RRNA GENITAL QL PROBE: NEGATIVE

## 2018-09-04 ENCOUNTER — TELEPHONE (OUTPATIENT)
Dept: OBSTETRICS AND GYNECOLOGY | Facility: CLINIC | Age: 68
End: 2018-09-04

## 2018-09-04 NOTE — TELEPHONE ENCOUNTER
----- Message from Francisco Javier Donohue sent at 9/4/2018 10:46 AM CDT -----  Pt calling for her  results [phone]

## 2018-09-07 ENCOUNTER — TELEPHONE (OUTPATIENT)
Dept: OBSTETRICS AND GYNECOLOGY | Facility: CLINIC | Age: 68
End: 2018-09-07

## 2018-09-07 NOTE — TELEPHONE ENCOUNTER
Pt states that she is still having yellow discharge. Has not started her antibiotics from here pcp yet for UTI. Was waiting on recent culture results before starting. Please advise.   ----- Message from Gt Parmar sent at 9/7/2018 10:38 AM CDT -----  Contact: ANTOINE FULLER [[id number]]            Name of Who is Calling: ANTOINE FULLER [[id number]]      What is the request in detail: Patient would like to follow up on her test results. Please advise      Can the clinic reply by [facility]: no      What Number to [facility] Back [facility]: [phone]

## 2018-09-11 NOTE — TELEPHONE ENCOUNTER
Called patient, discussed that there was no vaginal infection, just postmenopausal atrophy that will improve with continued use of the estrogen cream. Advised to take her macrobid for her UTI and to complete the entire prescription.

## 2018-12-04 ENCOUNTER — TELEPHONE (OUTPATIENT)
Dept: FAMILY MEDICINE | Facility: CLINIC | Age: 68
End: 2018-12-04

## 2018-12-04 ENCOUNTER — OFFICE VISIT (OUTPATIENT)
Dept: FAMILY MEDICINE | Facility: CLINIC | Age: 68
End: 2018-12-04
Payer: MEDICARE

## 2018-12-04 VITALS
BODY MASS INDEX: 16.93 KG/M2 | DIASTOLIC BLOOD PRESSURE: 60 MMHG | WEIGHT: 99.19 LBS | TEMPERATURE: 98 F | SYSTOLIC BLOOD PRESSURE: 102 MMHG | RESPIRATION RATE: 16 BRPM | HEIGHT: 64 IN

## 2018-12-04 DIAGNOSIS — R09.81 NASAL CONGESTION: ICD-10-CM

## 2018-12-04 DIAGNOSIS — J02.9 SORE THROAT: Primary | ICD-10-CM

## 2018-12-04 DIAGNOSIS — J04.0 LARYNGITIS: ICD-10-CM

## 2018-12-04 DIAGNOSIS — R05.9 COUGH: ICD-10-CM

## 2018-12-04 LAB — DEPRECATED S PYO AG THROAT QL EIA: NEGATIVE

## 2018-12-04 PROCEDURE — 99213 OFFICE O/P EST LOW 20 MIN: CPT | Mod: HCNC,S$GLB,, | Performed by: FAMILY MEDICINE

## 2018-12-04 PROCEDURE — 99000 SPECIMEN HANDLING OFFICE-LAB: CPT | Mod: HCNC,S$GLB,, | Performed by: FAMILY MEDICINE

## 2018-12-04 PROCEDURE — 99999 PR PBB SHADOW E&M-EST. PATIENT-LVL IV: CPT | Mod: PBBFAC,HCNC,, | Performed by: FAMILY MEDICINE

## 2018-12-04 PROCEDURE — 1101F PT FALLS ASSESS-DOCD LE1/YR: CPT | Mod: CPTII,HCNC,S$GLB, | Performed by: FAMILY MEDICINE

## 2018-12-04 PROCEDURE — 87081 CULTURE SCREEN ONLY: CPT | Mod: HCNC

## 2018-12-04 PROCEDURE — 87880 STREP A ASSAY W/OPTIC: CPT | Mod: HCNC,PO

## 2018-12-04 RX ORDER — GUAIFENESIN 400 MG/1
400 TABLET ORAL 2 TIMES DAILY PRN
Qty: 20 TABLET | Refills: 0 | Status: SHIPPED | OUTPATIENT
Start: 2018-12-04 | End: 2018-12-14

## 2018-12-04 RX ORDER — BENZONATATE 200 MG/1
200 CAPSULE ORAL 3 TIMES DAILY PRN
Qty: 30 CAPSULE | Refills: 0 | Status: SHIPPED | OUTPATIENT
Start: 2018-12-04 | End: 2018-12-14

## 2018-12-04 RX ORDER — IPRATROPIUM BROMIDE 21 UG/1
2 SPRAY, METERED NASAL 2 TIMES DAILY PRN
Qty: 30 ML | Refills: 1 | Status: SHIPPED | OUTPATIENT
Start: 2018-12-04 | End: 2019-04-22 | Stop reason: ALTCHOICE

## 2018-12-04 NOTE — PATIENT INSTRUCTIONS
Viral Upper Respiratory Illness (Adult)  You have a viral upper respiratory illness (URI), which is another term for the common cold. This illness is contagious during the first few days. It is spread through the air by coughing and sneezing. It may also be spread by direct contact (touching the sick person and then touching your own eyes, nose, or mouth). Frequent handwashing will decrease risk of spread. Most viral illnesses go away within 7 to 10 days with rest and simple home remedies. Sometimes the illness may last for several weeks. Antibiotics will not kill a virus, and they are generally not prescribed for this condition.    Home care  · If symptoms are severe, rest at home for the first 2 to 3 days. When you resume activity, don't let yourself get too tired.  · Avoid being exposed to cigarette smoke (yours or others).  · You may use acetaminophen or ibuprofen to control pain and fever, unless another medicine was prescribed. (Note: If you have chronic liver or kidney disease, have ever had a stomach ulcer or gastrointestinal bleeding, or are taking blood-thinning medicines, talk with your healthcare provider before using these medicines.) Aspirin should never be given to anyone under 18 years of age who is ill with a viral infection or fever. It may cause severe liver or brain damage.  · Your appetite may be poor, so a light diet is fine. Avoid dehydration by drinking 6 to 8 glasses of fluids per day (water, soft drinks, juices, tea, or soup). Extra fluids will help loosen secretions in the nose and lungs.  · Over-the-counter cold medicines will not shorten the length of time youre sick, but they may be helpful for the following symptoms: cough, sore throat, and nasal and sinus congestion. (Note: Do not use decongestants if you have high blood pressure.)  Follow-up care  Follow up with your healthcare provider, or as advised.  When to seek medical advice  Call your healthcare provider right away if any  of these occur:  · Cough with lots of colored sputum (mucus)  · Severe headache; face, neck, or ear pain  · Difficulty swallowing due to throat pain  · Fever of 100.4°F (38°C)  Call 911, or get immediate medical care  Call emergency services right away if any of these occur:  · Chest pain, shortness of breath, wheezing, or difficulty breathing  · Coughing up blood  · Inability to swallow due to throat pain  Date Last Reviewed: 9/13/2015  © 5107-7184 [employer]. [address] [place] [address]. All rights reserved. This information is not intended as a substitute for professional medical care. Always follow your healthcare professional's instructions.

## 2018-12-05 NOTE — PROGRESS NOTES
Pt complaining of facial pressure and cough, green nasal discharge. Patient complaining of hoarseness and a mild sore throat over the last 4 days  Past Medical History:   Diagnosis Date    Multiple thyroid nodules     Osteoporosis     Reflux     Senile nuclear sclerosis - Both Eyes 10/1/2013     Social History     Socioeconomic History    Marital status: Single     Spouse name: Not on file    Number of children: 0    Years of education: Not on file    Highest education level: Not on file   Social Needs    Financial resource strain: Not on file    Food insecurity - worry: Not on file    Food insecurity - inability: Not on file    Transportation needs - medical: Not on file    Transportation needs - non-medical: Not on file   Occupational History    Occupation:      Employer: Currently   Tobacco Use    Smoking status: Never Smoker    Smokeless tobacco: Never Used   Substance and Sexual Activity    Alcohol use: No    Drug use: No    Sexual activity: Not Currently   Other Topics Concern    Not on file   Social History Narrative    Not on file     Physical Exam:  VS:  Vitals:    12/04/18 1045   BP: 102/60   Resp: 16   Temp: 98.1 °F (36.7 °C)     Heent: TM's clear bilaterally. Ear canalls clear bilaterally. Frontal and Maxillary   Sinuses tender to percussion bilaterally. Throat clear , no exudate.PERRLA. conjuctiva clear bilaterally.  Neck: Supple nontender, no thyromegaly, no carotid bruits bilaterally.  Cardiovascular: Regular rate and rhythm no murmurs auscultated.  Lungs: Clear to auscultation bilaterally, with good respiratory excursion, no evidence of respiratory distress.  Abdomen: Soft nontender, normal bowel sounds no hepatosplenomegaly.  Skin: Good turgor, no lesions observed.    Impression: sinus infection                      Laryngitis/sorethroat                     Upper respiratory infection  Plan: See med card dated 12/04/2018   Antitussives -  Non-Opioid    benzonatate (TESSALON) 200 MG capsule 200 mg, 3 times daily PRN       Bone Resorption Inhibitors - Bisphosphonates    alendronate (FOSAMAX) 70 MG tablet        Expectorants - Single Agents, General    guaiFENesin (HUMIBID E) 400 mg Tab 400 mg, 2 times daily PRN       Gastric Acid Secretion Reducers - Histamine H2-Receptor Antagonists    ranitidine (ZANTAC) 150 MG tablet 150 mg, 2 times daily PRN         Refer to orders dated 12/04/2018   Strep A culture, throat         Throat Screen, Rapid        Will contact patient with results when available

## 2018-12-06 LAB — BACTERIA THROAT CULT: NORMAL

## 2019-01-08 ENCOUNTER — TELEPHONE (OUTPATIENT)
Dept: FAMILY MEDICINE | Facility: CLINIC | Age: 69
End: 2019-01-08

## 2019-01-08 DIAGNOSIS — M81.0 OSTEOPOROSIS, UNSPECIFIED OSTEOPOROSIS TYPE, UNSPECIFIED PATHOLOGICAL FRACTURE PRESENCE: ICD-10-CM

## 2019-01-08 DIAGNOSIS — D64.9 ANEMIA, UNSPECIFIED TYPE: ICD-10-CM

## 2019-01-08 DIAGNOSIS — E78.5 HYPERLIPIDEMIA, UNSPECIFIED HYPERLIPIDEMIA TYPE: ICD-10-CM

## 2019-01-08 DIAGNOSIS — Z00.00 ROUTINE GENERAL MEDICAL EXAMINATION AT A HEALTH CARE FACILITY: Primary | ICD-10-CM

## 2019-01-08 NOTE — TELEPHONE ENCOUNTER
----- Message from Laura Narayanan sent at 1/8/2019 11:16 AM CST -----  Contact: self/ [phone]  Doctor appointment and lab have been scheduled.  Please link lab orders to the lab appointment.  Date of doctor appointment:  4/10  Physical or EP:  phys  Date of lab appointment:  4/3  Comments: Patient would like a B12 lab test as well

## 2019-01-15 ENCOUNTER — OFFICE VISIT (OUTPATIENT)
Dept: OPTOMETRY | Facility: CLINIC | Age: 69
End: 2019-01-15
Payer: MEDICARE

## 2019-01-15 DIAGNOSIS — H25.13 NUCLEAR SENILE CATARACT OF BOTH EYES: Primary | ICD-10-CM

## 2019-01-15 DIAGNOSIS — H43.811 POSTERIOR VITREOUS DETACHMENT OF RIGHT EYE: ICD-10-CM

## 2019-01-15 PROCEDURE — 92014 COMPRE OPH EXAM EST PT 1/>: CPT | Mod: HCNC,S$GLB,, | Performed by: OPTOMETRIST

## 2019-01-15 PROCEDURE — 99999 PR PBB SHADOW E&M-EST. PATIENT-LVL II: ICD-10-PCS | Mod: PBBFAC,HCNC,, | Performed by: OPTOMETRIST

## 2019-01-15 PROCEDURE — 92014 PR EYE EXAM, EST PATIENT,COMPREHESV: ICD-10-PCS | Mod: HCNC,S$GLB,, | Performed by: OPTOMETRIST

## 2019-01-15 PROCEDURE — 99999 PR PBB SHADOW E&M-EST. PATIENT-LVL II: CPT | Mod: PBBFAC,HCNC,, | Performed by: OPTOMETRIST

## 2019-01-15 NOTE — PROGRESS NOTES
TRINITY BANEGAS 08/2016 Glasses at least 5 yrs. Old, still seem fine.  Patient hasn't   noticed any vision changes.  Noticed black spots OD the past 2 weeks, no   flashes.  Not using any drops.  No flashes    Last edited by Gerry Zendejas, OD on 1/15/2019 11:48 AM. (History)            Assessment /Plan     For exam results, see Encounter Report.    Nuclear senile cataract of both eyes    Posterior vitreous detachment of right eye      1. Educated pt on presence of cataracts and effects on vision. No surgery at this time. Recheck in one year.  2. Causing floaters. No evidence of holes, tears or detachment of retina. Negative Shafers sign in vitreous. 90 diopter lens exam negative in all quadrants. Patient educated to signs and symptoms of retinal detachment and return to clinic immediately if signs or symptoms arise.

## 2019-03-25 DIAGNOSIS — M81.0 OSTEOPOROSIS: ICD-10-CM

## 2019-03-25 RX ORDER — ALENDRONATE SODIUM 70 MG/1
TABLET ORAL
Qty: 12 TABLET | Refills: 3 | Status: SHIPPED | OUTPATIENT
Start: 2019-03-25 | End: 2019-04-10 | Stop reason: SDUPTHER

## 2019-04-03 ENCOUNTER — LAB VISIT (OUTPATIENT)
Dept: LAB | Facility: HOSPITAL | Age: 69
End: 2019-04-03
Attending: FAMILY MEDICINE
Payer: MEDICARE

## 2019-04-03 DIAGNOSIS — Z00.00 ROUTINE GENERAL MEDICAL EXAMINATION AT A HEALTH CARE FACILITY: ICD-10-CM

## 2019-04-03 DIAGNOSIS — M81.0 OSTEOPOROSIS, UNSPECIFIED OSTEOPOROSIS TYPE, UNSPECIFIED PATHOLOGICAL FRACTURE PRESENCE: ICD-10-CM

## 2019-04-03 DIAGNOSIS — E78.5 HYPERLIPIDEMIA, UNSPECIFIED HYPERLIPIDEMIA TYPE: ICD-10-CM

## 2019-04-03 DIAGNOSIS — D64.9 ANEMIA, UNSPECIFIED TYPE: ICD-10-CM

## 2019-04-03 LAB
ALBUMIN SERPL BCP-MCNC: 4 G/DL (ref 3.5–5.2)
ALP SERPL-CCNC: 52 U/L (ref 55–135)
ALT SERPL W/O P-5'-P-CCNC: 19 U/L (ref 10–44)
ANION GAP SERPL CALC-SCNC: 7 MMOL/L (ref 8–16)
AST SERPL-CCNC: 22 U/L (ref 10–40)
BASOPHILS # BLD AUTO: 0.02 K/UL (ref 0–0.2)
BASOPHILS NFR BLD: 0.6 % (ref 0–1.9)
BILIRUB SERPL-MCNC: 0.7 MG/DL (ref 0.1–1)
BUN SERPL-MCNC: 18 MG/DL (ref 8–23)
CALCIUM SERPL-MCNC: 9.7 MG/DL (ref 8.7–10.5)
CHLORIDE SERPL-SCNC: 102 MMOL/L (ref 95–110)
CHOLEST SERPL-MCNC: 221 MG/DL (ref 120–199)
CHOLEST/HDLC SERPL: 2.5 {RATIO} (ref 2–5)
CO2 SERPL-SCNC: 30 MMOL/L (ref 23–29)
CREAT SERPL-MCNC: 0.8 MG/DL (ref 0.5–1.4)
DIFFERENTIAL METHOD: ABNORMAL
EOSINOPHIL # BLD AUTO: 0 K/UL (ref 0–0.5)
EOSINOPHIL NFR BLD: 0.3 % (ref 0–8)
ERYTHROCYTE [DISTWIDTH] IN BLOOD BY AUTOMATED COUNT: 13 % (ref 11.5–14.5)
EST. GFR  (AFRICAN AMERICAN): >60 ML/MIN/1.73 M^2
EST. GFR  (NON AFRICAN AMERICAN): >60 ML/MIN/1.73 M^2
GLUCOSE SERPL-MCNC: 91 MG/DL (ref 70–110)
HCT VFR BLD AUTO: 43.9 % (ref 37–48.5)
HDLC SERPL-MCNC: 90 MG/DL (ref 40–75)
HDLC SERPL: 40.7 % (ref 20–50)
HGB BLD-MCNC: 14 G/DL (ref 12–16)
IMM GRANULOCYTES # BLD AUTO: 0.01 K/UL (ref 0–0.04)
IMM GRANULOCYTES NFR BLD AUTO: 0.3 % (ref 0–0.5)
LDLC SERPL CALC-MCNC: 111.4 MG/DL (ref 63–159)
LYMPHOCYTES # BLD AUTO: 1.1 K/UL (ref 1–4.8)
LYMPHOCYTES NFR BLD: 33.7 % (ref 18–48)
MCH RBC QN AUTO: 30.2 PG (ref 27–31)
MCHC RBC AUTO-ENTMCNC: 31.9 G/DL (ref 32–36)
MCV RBC AUTO: 95 FL (ref 82–98)
MONOCYTES # BLD AUTO: 0.2 K/UL (ref 0.3–1)
MONOCYTES NFR BLD: 6.9 % (ref 4–15)
NEUTROPHILS # BLD AUTO: 2 K/UL (ref 1.8–7.7)
NEUTROPHILS NFR BLD: 58.2 % (ref 38–73)
NONHDLC SERPL-MCNC: 131 MG/DL
NRBC BLD-RTO: 0 /100 WBC
PLATELET # BLD AUTO: 231 K/UL (ref 150–350)
PMV BLD AUTO: 10.3 FL (ref 9.2–12.9)
POTASSIUM SERPL-SCNC: 4.4 MMOL/L (ref 3.5–5.1)
PROT SERPL-MCNC: 7 G/DL (ref 6–8.4)
RBC # BLD AUTO: 4.64 M/UL (ref 4–5.4)
SODIUM SERPL-SCNC: 139 MMOL/L (ref 136–145)
TRIGL SERPL-MCNC: 98 MG/DL (ref 30–150)
TSH SERPL DL<=0.005 MIU/L-ACNC: 1.67 UIU/ML (ref 0.4–4)
WBC # BLD AUTO: 3.35 K/UL (ref 3.9–12.7)

## 2019-04-03 PROCEDURE — 85025 COMPLETE CBC W/AUTO DIFF WBC: CPT | Mod: HCNC

## 2019-04-03 PROCEDURE — 80053 COMPREHEN METABOLIC PANEL: CPT | Mod: HCNC

## 2019-04-03 PROCEDURE — 36415 COLL VENOUS BLD VENIPUNCTURE: CPT | Mod: HCNC,PO

## 2019-04-03 PROCEDURE — 84443 ASSAY THYROID STIM HORMONE: CPT | Mod: HCNC

## 2019-04-03 PROCEDURE — 80061 LIPID PANEL: CPT | Mod: HCNC

## 2019-04-10 ENCOUNTER — OFFICE VISIT (OUTPATIENT)
Dept: FAMILY MEDICINE | Facility: CLINIC | Age: 69
End: 2019-04-10
Payer: MEDICARE

## 2019-04-10 VITALS
TEMPERATURE: 98 F | BODY MASS INDEX: 16.24 KG/M2 | WEIGHT: 97.44 LBS | DIASTOLIC BLOOD PRESSURE: 62 MMHG | SYSTOLIC BLOOD PRESSURE: 90 MMHG | HEIGHT: 65 IN | RESPIRATION RATE: 16 BRPM

## 2019-04-10 DIAGNOSIS — Z12.39 SCREENING BREAST EXAMINATION: ICD-10-CM

## 2019-04-10 DIAGNOSIS — M81.0 OSTEOPOROSIS: ICD-10-CM

## 2019-04-10 DIAGNOSIS — K21.9 GASTROESOPHAGEAL REFLUX DISEASE, ESOPHAGITIS PRESENCE NOT SPECIFIED: Primary | ICD-10-CM

## 2019-04-10 PROCEDURE — 99499 RISK ADDL DX/OHS AUDIT: ICD-10-PCS | Mod: S$GLB,,, | Performed by: FAMILY MEDICINE

## 2019-04-10 PROCEDURE — 99397 PER PM REEVAL EST PAT 65+ YR: CPT | Mod: HCNC,S$GLB,, | Performed by: FAMILY MEDICINE

## 2019-04-10 PROCEDURE — 99499 UNLISTED E&M SERVICE: CPT | Mod: S$GLB,,, | Performed by: FAMILY MEDICINE

## 2019-04-10 PROCEDURE — 99999 PR PBB SHADOW E&M-EST. PATIENT-LVL III: ICD-10-PCS | Mod: PBBFAC,HCNC,, | Performed by: FAMILY MEDICINE

## 2019-04-10 PROCEDURE — 99999 PR PBB SHADOW E&M-EST. PATIENT-LVL III: CPT | Mod: PBBFAC,HCNC,, | Performed by: FAMILY MEDICINE

## 2019-04-10 PROCEDURE — 99397 PR PREVENTIVE VISIT,EST,65 & OVER: ICD-10-PCS | Mod: HCNC,S$GLB,, | Performed by: FAMILY MEDICINE

## 2019-04-10 RX ORDER — ESOMEPRAZOLE MAGNESIUM 40 MG/1
40 GRANULE, DELAYED RELEASE ORAL
Qty: 30 EACH | Refills: 11 | Status: SHIPPED | OUTPATIENT
Start: 2019-04-10 | End: 2020-04-09

## 2019-04-10 RX ORDER — HYDROGEN PEROXIDE 3 %
20 SOLUTION, NON-ORAL MISCELLANEOUS
COMMUNITY
End: 2019-04-10 | Stop reason: SDUPTHER

## 2019-04-10 RX ORDER — HYDROGEN PEROXIDE 3 %
20 SOLUTION, NON-ORAL MISCELLANEOUS
Qty: 30 CAPSULE | Refills: 12 | Status: SHIPPED | OUTPATIENT
Start: 2019-04-10 | End: 2019-04-10

## 2019-04-10 RX ORDER — ALENDRONATE SODIUM 70 MG/1
70 TABLET ORAL
Qty: 4 TABLET | Refills: 12 | Status: SHIPPED | OUTPATIENT
Start: 2019-04-10 | End: 2020-04-26

## 2019-04-10 NOTE — PROGRESS NOTES
Marshall Almanza is a 69 y.o. female   Routine physical  Source of history: Patient  Past Medical History:   Diagnosis Date    Arthritis     Multiple thyroid nodules     Osteoporosis     Reflux     Senile nuclear sclerosis - Both Eyes 10/1/2013     Patient  reports that she has never smoked. She has never used smokeless tobacco. She reports that she does not drink alcohol or use drugs.  Family History   Problem Relation Age of Onset    Cataracts Mother     Cancer Father         leukemia    Amblyopia Neg Hx     Blindness Neg Hx     Diabetes Neg Hx     Glaucoma Neg Hx     Hypertension Neg Hx     Macular degeneration Neg Hx     Retinal detachment Neg Hx     Strabismus Neg Hx     Stroke Neg Hx     Thyroid disease Neg Hx     Breast cancer Neg Hx     Colon cancer Neg Hx     Ovarian cancer Neg Hx      ROS:  GENERAL: No fever, chills, fatigability or weight loss.  SKIN: No rashes, itching or changes in color or texture of skin.  HEAD: No headaches or recent head trauma.  EYES: Visual acuity fine. No photophobia, ocular pain or diplopia.  EARS: Denies ear pain, discharge or vertigo.  NOSE: No loss of smell, no epistaxis or postnasal drip.  MOUTH & THROAT: No hoarseness or change in voice. No excessive gum bleeding.  NODES: Denies swollen glands.  CHEST: Denies BARBOSA, cyanosis, wheezing, cough and sputum production.  CARDIOVASCULAR: Denies chest pain, PND, orthopnea or reduced exercise tolerance.  ABDOMEN: Appetite fine. No weight loss. Denies diarrhea, abdominal pain, hematemesis or blood in stool.  URINARY: No flank pain, dysuria or hematuria.  PERIPHERAL VASCULAR: No claudication or cyanosis.  MUSCULOSKELETAL: No joint stiffness or swelling. Denies back pain.  NEUROLOGIC: No history of seizures, paralysis, alteration of gait or coordination.    OBJECTIVE:  APPEARANCE:  Thin no acute distress  Vitals:    04/10/19 1358   BP: 90/62   Resp: 16   Temp: 97.8 °F (36.6 °C)     SKIN: Normal skin turgor, no  lesions.  HEENT: Both external auditory canals clear. Both tympanic membranes intact. PERRL.   EOMI. Disk margins sharp. No tonsillar enlargement. No pharyngeal erythema or exudate. No stridor.  NECK: No bruits. No cervical spine tenderness. No cervical lymphadenopathy. No thyromegaly.  NODES: No cervical, axillary or inguinal lymph node enlargement.  CHEST: Breath sounds clear bilaterally. Lungs clear to auscultation & percussion.   Good air movement. No rales. No retractions. No rhonchi. No stridor. No wheezes.  CARDIOVASCULAR: Normal S1, S2. No murmurs. No edema.  BREASTS: no masses palpated in either breast or axillary area, symmetry noted.  ABDOMEN: Bowel sounds normal. No palpable aortic enlargement. No CVA tenderness. No pulsatile mass. No rebound tenderness.  PERIPHERAL VASCULAR: Femoral pulses present and symmetrical. No edema.  MUSCULOSKELETAL: Degenerative changes of both ankles, foot, knee, wrist and hand.  BACK: No CVA tenderness. There is no spasm, tenderness or radiculopathy noted with palpation and there is full range of motion.   NEUROLOGIC:   Cranial Nerves: II-XII grossly intact.  Motor: 5/5 strength major flexors/extensors. No tremor.  DTR's: Knees, Ankles 2+ and equal bilaterally; downgoing toes.  Sensory: Intact to light touch distally.  Gait & Posture: Normal gait and fine motion. No cerebellar signs.  MENTAL STATUS: Alert. Oriented x 3. Language skills normal. Memory intact.   No suicidal ideation. Normal affect. Well kept appearance.    ASSESSMENT/PLAN:   Marshall was seen today for annual exam.    Diagnoses and all orders for this visit:    Gastroesophageal reflux disease, esophagitis presence not specified  -     esomeprazole (NEXIUM) 40 mg GrPS; Take 40 mg by mouth before breakfast.    Osteoporosis  -     alendronate (FOSAMAX) 70 MG tablet; Take 1 tablet (70 mg total) by mouth every 7 days.    Screening breast examination  -     Mammo Digital Screening Bilateral With CAD; Future    Labs  reviewed no areas of concern will contact patient with remaining test when

## 2019-04-12 ENCOUNTER — HOSPITAL ENCOUNTER (OUTPATIENT)
Dept: RADIOLOGY | Facility: HOSPITAL | Age: 69
Discharge: HOME | End: 2019-04-12
Attending: FAMILY MEDICINE
Payer: MEDICARE

## 2019-04-12 DIAGNOSIS — Z12.39 SCREENING BREAST EXAMINATION: ICD-10-CM

## 2019-04-12 PROCEDURE — 77067 SCR MAMMO BI INCL CAD: CPT | Mod: TC,HCNC,PO

## 2019-04-12 PROCEDURE — 77067 MAMMO DIGITAL SCREENING BILAT WITH TOMOSYNTHESIS_CAD: ICD-10-PCS | Mod: 26,HCNC,, | Performed by: RADIOLOGY

## 2019-04-12 PROCEDURE — 77063 BREAST TOMOSYNTHESIS BI: CPT | Mod: 26,HCNC,, | Performed by: RADIOLOGY

## 2019-04-12 PROCEDURE — 77063 MAMMO DIGITAL SCREENING BILAT WITH TOMOSYNTHESIS_CAD: ICD-10-PCS | Mod: 26,HCNC,, | Performed by: RADIOLOGY

## 2019-04-12 PROCEDURE — 77067 SCR MAMMO BI INCL CAD: CPT | Mod: 26,HCNC,, | Performed by: RADIOLOGY

## 2019-04-22 ENCOUNTER — OFFICE VISIT (OUTPATIENT)
Dept: INTERNAL MEDICINE | Facility: CLINIC | Age: 69
End: 2019-04-22
Payer: MEDICARE

## 2019-04-22 VITALS
DIASTOLIC BLOOD PRESSURE: 78 MMHG | HEART RATE: 76 BPM | BODY MASS INDEX: 16.6 KG/M2 | HEIGHT: 64 IN | SYSTOLIC BLOOD PRESSURE: 118 MMHG | WEIGHT: 97.25 LBS | OXYGEN SATURATION: 95 % | TEMPERATURE: 98 F | RESPIRATION RATE: 18 BRPM

## 2019-04-22 DIAGNOSIS — L02.32 BOIL OF BUTTOCK: Primary | ICD-10-CM

## 2019-04-22 PROCEDURE — 99213 OFFICE O/P EST LOW 20 MIN: CPT | Mod: HCNC,S$GLB,, | Performed by: HOSPITALIST

## 2019-04-22 PROCEDURE — 99999 PR PBB SHADOW E&M-EST. PATIENT-LVL IV: ICD-10-PCS | Mod: PBBFAC,HCNC,, | Performed by: HOSPITALIST

## 2019-04-22 PROCEDURE — 99213 PR OFFICE/OUTPT VISIT, EST, LEVL III, 20-29 MIN: ICD-10-PCS | Mod: HCNC,S$GLB,, | Performed by: HOSPITALIST

## 2019-04-22 PROCEDURE — 99999 PR PBB SHADOW E&M-EST. PATIENT-LVL IV: CPT | Mod: PBBFAC,HCNC,, | Performed by: HOSPITALIST

## 2019-04-22 PROCEDURE — 1101F PT FALLS ASSESS-DOCD LE1/YR: CPT | Mod: HCNC,CPTII,S$GLB, | Performed by: HOSPITALIST

## 2019-04-22 PROCEDURE — 1101F PR PT FALLS ASSESS DOC 0-1 FALLS W/OUT INJ PAST YR: ICD-10-PCS | Mod: HCNC,CPTII,S$GLB, | Performed by: HOSPITALIST

## 2019-04-22 NOTE — PROGRESS NOTES
"Subjective:     @Patient ID: Marshall Almanza is a 69 y.o. female.    Chief Complaint: possible cyst (left buttock)    HPI  68 yo F presents for urgent visit for bump located around R buttock area.   X 2 days   Reports pressure but denies pain  Reports she used a Japanese cream but no relief. Denies fever/chills or drainage. Has not had before      Review of Systems   Constitutional: Negative for chills and fever.   HENT: Negative for congestion and sore throat.    Eyes: Negative for pain and visual disturbance.   Respiratory: Negative for cough and shortness of breath.    Cardiovascular: Negative for chest pain and leg swelling.   Gastrointestinal: Negative for abdominal pain, nausea and vomiting.   Endocrine: Negative for polydipsia and polyuria.   Genitourinary: Negative for difficulty urinating and dysuria.   Musculoskeletal: Negative for arthralgias and back pain.   Skin: Positive for color change. Negative for rash and wound.   Neurological: Negative for dizziness, weakness and headaches.   Psychiatric/Behavioral: Negative for agitation and confusion.     Past medical history, surgical history, and family medical history reviewed and updated as appropriate.    Medications and allergies reviewed.     Objective:     Vitals:    04/22/19 1057   BP: 118/78   BP Location: Right arm   Patient Position: Sitting   BP Method: Medium (Manual)   Pulse: 76   Resp: 18   Temp: 97.7 °F (36.5 °C)   TempSrc: Oral   SpO2: 95%   Weight: 44.1 kg (97 lb 3.6 oz)   Height: 5' 4" (1.626 m)     Body mass index is 16.69 kg/m².  Physical Exam   Constitutional: She is oriented to person, place, and time. She appears well-developed and well-nourished. No distress.   HENT:   Head: Normocephalic and atraumatic.   Mouth/Throat: Oropharynx is clear and moist. No oropharyngeal exudate.   Eyes: Conjunctivae are normal. Right eye exhibits no discharge. Left eye exhibits no discharge.   Neck: Normal range of motion. Neck supple.   Cardiovascular: " Normal rate, regular rhythm and intact distal pulses. Exam reveals no friction rub.   No murmur heard.  Pulmonary/Chest: Effort normal and breath sounds normal.   Abdominal: Soft. Bowel sounds are normal. She exhibits no distension. There is no tenderness. There is no guarding.   Musculoskeletal: Normal range of motion. She exhibits no edema.   Neurological: She is alert and oriented to person, place, and time.   Skin: Skin is warm and dry.   Small boil of inner R buttock   Psychiatric: She has a normal mood and affect. Her behavior is normal.   Vitals reviewed.      Lab Results   Component Value Date    WBC 3.35 (L) 04/03/2019    HGB 14.0 04/03/2019    HCT 43.9 04/03/2019     04/03/2019    CHOL 221 (H) 04/03/2019    TRIG 98 04/03/2019    HDL 90 (H) 04/03/2019    ALT 19 04/03/2019    AST 22 04/03/2019     04/03/2019    K 4.4 04/03/2019     04/03/2019    CREATININE 0.8 04/03/2019    BUN 18 04/03/2019    CO2 30 (H) 04/03/2019    TSH 1.673 04/03/2019    PSA <0.01 09/24/2014    INR 1.0 01/17/2004    HGBA1C 5.6 11/17/2015       Assessment:     1. Boil of buttock      Plan:   Marshall was seen today for possible cyst.    Diagnoses and all orders for this visit:    Boil of buttock        - Appears to be small. No signs of drainage. Pt declines oral abx. Recommend warm compresses to the area and topical antibacterial ointment. Notify MD if no improvement.   -     bacitracin-polymyxin b (POLYSPORIN) ointment; Apply topically 3 (three) times daily.          Follow up if symptoms worsen or fail to improve.    Akosua Whaley MD  Internal Medicine    4/22/2019

## 2019-04-22 NOTE — PATIENT INSTRUCTIONS
Abscess (Antibiotic Treatment Only)  An abscess (sometimes called a boil) happens when bacteria get trapped under the skin and start to grow. Pus forms inside the abscess as the body responds to the bacteria. An abscess can happen with an insect bite, ingrown hair, blocked oil gland, pimple, cyst, or puncture wound.  In the early stages, your wound may be red and tender. For this stage, you may get antibiotics. If the abscess does not get better with antibiotics, it will need to be drained with a small cut.  Home care  These tips will help you care for your abscess at home:  · Soak the wound in hot water or apply hot packs (small towel soaked in hot water) to the area for 20 minutes at a time. Do this 3 to 4 times a day.  · Do not cut, squeeze, or pop the boil yourself.  · Apply antibiotic cream or ointment to the skin 3 to 4 times a day, unless something else was prescribed. Some ointments include an antibiotic plus a pain reliever.  · If your doctor prescribed antibiotics, do not stop taking them until you have finished the medicine or the doctor tells you to stop.  · You may use an over-the-counter pain medicine to control pain, unless another pain medicine was prescribed. If you have chronic liver or kidney disease or ever had a stomach ulcer or gastrointestinal bleeding, talk with your doctor before using these any of these.  Follow-up care  Follow up with your healthcare provider, or as advised. Check your wound each day for the signs of worsening infection listed below.  When to seek medical advice  Get prompt medical attention if any of these occur:  · An increase in redness or swelling  · Red streaks in the skin leading away from the abscess  · An increase in local pain or swelling  · Fever of 100.4ºF (38ºC) or higher, or as directed by your healthcare provider  · Pus or fluid coming from the abscess  · Boil returns after getting better  Date Last Reviewed: 9/1/2016  © 4468-3442 [employer] [address] [place] [address]. All rights reserved. This information is not intended as a substitute for professional medical care. Always follow your healthcare professional's instructions.

## 2019-06-13 ENCOUNTER — PES CALL (OUTPATIENT)
Dept: ADMINISTRATIVE | Facility: CLINIC | Age: 69
End: 2019-06-13

## 2020-01-10 ENCOUNTER — TELEPHONE (OUTPATIENT)
Dept: PRIMARY CARE CLINIC | Facility: CLINIC | Age: 70
End: 2020-01-10

## 2020-01-10 DIAGNOSIS — K21.9 GASTROESOPHAGEAL REFLUX DISEASE WITHOUT ESOPHAGITIS: ICD-10-CM

## 2020-01-10 DIAGNOSIS — Z01.89 ROUTINE LAB DRAW: ICD-10-CM

## 2020-01-10 DIAGNOSIS — R79.0 ABNORMAL LEVEL OF BLOOD MINERAL: ICD-10-CM

## 2020-01-10 DIAGNOSIS — Z00.00 ROUTINE MEDICAL EXAM: ICD-10-CM

## 2020-01-10 DIAGNOSIS — R53.83 FATIGUE, UNSPECIFIED TYPE: ICD-10-CM

## 2020-01-10 DIAGNOSIS — M19.90 OSTEOARTHRITIS, UNSPECIFIED OSTEOARTHRITIS TYPE, UNSPECIFIED SITE: ICD-10-CM

## 2020-01-10 DIAGNOSIS — Z00.00 ROUTINE HISTORY AND PHYSICAL EXAMINATION OF ADULT: ICD-10-CM

## 2020-01-10 DIAGNOSIS — E07.9 THYROID CONDITION: ICD-10-CM

## 2020-01-10 DIAGNOSIS — Z00.00 ROUTINE ADULT HEALTH MAINTENANCE: Primary | ICD-10-CM

## 2020-01-10 NOTE — TELEPHONE ENCOUNTER
Please sign labs so they can be linked to appt     ----- Message from Amelia Eli sent at 1/10/2020  2:19 PM CST -----  Type:  Needs Medical Advice    Who Called: ALINA      Additional Information: LAB ORDERS NEEDED FOR 4/14/2020

## 2020-01-29 ENCOUNTER — OFFICE VISIT (OUTPATIENT)
Dept: OBSTETRICS AND GYNECOLOGY | Facility: CLINIC | Age: 70
End: 2020-01-29
Payer: MEDICARE

## 2020-01-29 ENCOUNTER — PATIENT OUTREACH (OUTPATIENT)
Dept: ADMINISTRATIVE | Facility: OTHER | Age: 70
End: 2020-01-29

## 2020-01-29 VITALS — BODY MASS INDEX: 17.07 KG/M2 | WEIGHT: 99.44 LBS | DIASTOLIC BLOOD PRESSURE: 82 MMHG | SYSTOLIC BLOOD PRESSURE: 128 MMHG

## 2020-01-29 DIAGNOSIS — R30.0 DYSURIA: Primary | ICD-10-CM

## 2020-01-29 DIAGNOSIS — N89.8 VAGINAL DISCHARGE: ICD-10-CM

## 2020-01-29 DIAGNOSIS — N95.2 POSTMENOPAUSAL ATROPHIC VAGINITIS: ICD-10-CM

## 2020-01-29 LAB
BACTERIA #/AREA URNS AUTO: NORMAL /HPF
BILIRUB UR QL STRIP: NEGATIVE
CLARITY UR REFRACT.AUTO: ABNORMAL
COLOR UR AUTO: YELLOW
GLUCOSE UR QL STRIP: ABNORMAL
HGB UR QL STRIP: NEGATIVE
KETONES UR QL STRIP: NEGATIVE
LEUKOCYTE ESTERASE UR QL STRIP: ABNORMAL
MICROSCOPIC COMMENT: NORMAL
NITRITE UR QL STRIP: NEGATIVE
PH UR STRIP: 5 [PH] (ref 5–8)
PROT UR QL STRIP: NEGATIVE
RBC #/AREA URNS AUTO: 0 /HPF (ref 0–4)
SP GR UR STRIP: 1.01 (ref 1–1.03)
URN SPEC COLLECT METH UR: ABNORMAL
WBC #/AREA URNS AUTO: 3 /HPF (ref 0–5)
YEAST UR QL AUTO: NORMAL

## 2020-01-29 PROCEDURE — 1159F PR MEDICATION LIST DOCUMENTED IN MEDICAL RECORD: ICD-10-PCS | Mod: HCNC,S$GLB,, | Performed by: OBSTETRICS & GYNECOLOGY

## 2020-01-29 PROCEDURE — 99999 PR PBB SHADOW E&M-EST. PATIENT-LVL III: CPT | Mod: PBBFAC,HCNC,, | Performed by: OBSTETRICS & GYNECOLOGY

## 2020-01-29 PROCEDURE — 87086 URINE CULTURE/COLONY COUNT: CPT | Mod: HCNC

## 2020-01-29 PROCEDURE — 81001 URINALYSIS AUTO W/SCOPE: CPT | Mod: HCNC

## 2020-01-29 PROCEDURE — 87661 TRICHOMONAS VAGINALIS AMPLIF: CPT | Mod: HCNC

## 2020-01-29 PROCEDURE — 99213 PR OFFICE/OUTPT VISIT, EST, LEVL III, 20-29 MIN: ICD-10-PCS | Mod: HCNC,S$GLB,, | Performed by: OBSTETRICS & GYNECOLOGY

## 2020-01-29 PROCEDURE — 99999 PR PBB SHADOW E&M-EST. PATIENT-LVL III: ICD-10-PCS | Mod: PBBFAC,HCNC,, | Performed by: OBSTETRICS & GYNECOLOGY

## 2020-01-29 PROCEDURE — 1126F AMNT PAIN NOTED NONE PRSNT: CPT | Mod: HCNC,S$GLB,, | Performed by: OBSTETRICS & GYNECOLOGY

## 2020-01-29 PROCEDURE — 87481 CANDIDA DNA AMP PROBE: CPT | Mod: 59,HCNC

## 2020-01-29 PROCEDURE — 1101F PT FALLS ASSESS-DOCD LE1/YR: CPT | Mod: HCNC,CPTII,S$GLB, | Performed by: OBSTETRICS & GYNECOLOGY

## 2020-01-29 PROCEDURE — 1159F MED LIST DOCD IN RCRD: CPT | Mod: HCNC,S$GLB,, | Performed by: OBSTETRICS & GYNECOLOGY

## 2020-01-29 PROCEDURE — 1126F PR PAIN SEVERITY QUANTIFIED, NO PAIN PRESENT: ICD-10-PCS | Mod: HCNC,S$GLB,, | Performed by: OBSTETRICS & GYNECOLOGY

## 2020-01-29 PROCEDURE — 1101F PR PT FALLS ASSESS DOC 0-1 FALLS W/OUT INJ PAST YR: ICD-10-PCS | Mod: HCNC,CPTII,S$GLB, | Performed by: OBSTETRICS & GYNECOLOGY

## 2020-01-29 PROCEDURE — 99213 OFFICE O/P EST LOW 20 MIN: CPT | Mod: HCNC,S$GLB,, | Performed by: OBSTETRICS & GYNECOLOGY

## 2020-01-29 RX ORDER — ESTRADIOL 10 UG/1
1 INSERT VAGINAL
Qty: 8 TABLET | Refills: 11 | Status: SHIPPED | OUTPATIENT
Start: 2020-01-30 | End: 2020-05-27

## 2020-01-29 NOTE — PROGRESS NOTES
Subjective:       Patient ID: Marshall Almanza is a 69 y.o. female.    Chief Complaint:  Vaginal Discharge (PAST 2 TO 3 WEEKS) and Dysuria      History of Present Illness  HPI  The patient presents today with complaints of a yellowish brown vaginaldischarge for the past few weeks. She also reports occasional dysuria and urinary frequency. She is not and has never been sexually active.  She denies any bleeding.    GYN & OB History  Patient's last menstrual period was 1998.   Date of Last Pap: 2013    OB History    Para Term  AB Living   0 0 0 0 0 0   SAB TAB Ectopic Multiple Live Births   0 0 0 0 0       Past Medical History:   Diagnosis Date    Arthritis     Multiple thyroid nodules     Osteoporosis     Reflux     Senile nuclear sclerosis - Both Eyes 10/1/2013       History reviewed. No pertinent surgical history.    Review of Systems  Review of Systems   Constitutional: Negative for activity change, appetite change, fatigue and unexpected weight change.   HENT: Negative.    Eyes: Negative for visual disturbance.   Respiratory: Negative for shortness of breath and wheezing.    Cardiovascular: Negative for chest pain, palpitations and leg swelling.   Gastrointestinal: Negative for abdominal pain, bloating and blood in stool.   Endocrine: Negative for diabetes and hair loss.   Genitourinary: Positive for dysuria, frequency and vaginal discharge. Negative for decreased libido, dyspareunia, postmenopausal bleeding and vaginal odor.   Musculoskeletal: Negative for back pain and joint swelling.   Integumentary:  Negative for acne, hair changes and nipple discharge.   Neurological: Negative for headaches.   Hematological: Does not bruise/bleed easily.   Psychiatric/Behavioral: Negative for depression and sleep disturbance. The patient is not nervous/anxious.    Breast: Negative for mastodynia and nipple discharge          Objective:      /82   Wt 45.1 kg (99 lb 6.8 oz)   LMP 1998    BMI 17.07 kg/m²   Physical Exam:               Genitourinary:   Genitourinary Comments: PELVIC: Normal external genitalia without lesions.  Normal hair distribution.  Adequate perineal body, normal urethral meatus.  Vagina  Dry and poorly rugated, FRIABLE, atrophic, without lesions or discharge.  Cervix pink,flat with vault without lesions, discharge or tenderness.  No significant cystocele or rectocele.  Bimanual exam shows uterus to be normal size, regular, mobile and nontender.  Adnexa without masses or tenderness.    RECTAL:Deferred                            Assessment:        1. Dysuria    2. Vaginal discharge    3. Postmenopausal atrophic vaginitis                Plan:      1. Dysuria      - Urine culture  - Urinalysis    2. Vaginal discharge      - Vaginosis Screen by DNA Probe    3. Postmenopausal atrophic vaginitis      - estradiol (YUVAFEM) 10 mcg Tab; Place 1 tablet (10 mcg total) vaginally twice a week.  Dispense: 8 tablet; Refill: 11       Follow up if symptoms worsen or fail to improve.

## 2020-01-30 LAB
BACTERIA UR CULT: NORMAL
BACTERIA UR CULT: NORMAL
BACTERIAL VAGINOSIS DNA: NEGATIVE
CANDIDA GLABRATA DNA: NEGATIVE
CANDIDA KRUSEI DNA: NEGATIVE
CANDIDA RRNA VAG QL PROBE: NEGATIVE
T VAGINALIS RRNA GENITAL QL PROBE: NEGATIVE

## 2020-02-03 ENCOUNTER — TELEPHONE (OUTPATIENT)
Dept: OBSTETRICS AND GYNECOLOGY | Facility: CLINIC | Age: 70
End: 2020-02-03

## 2020-02-03 NOTE — TELEPHONE ENCOUNTER
----- Message from Elma Swann MD sent at 2/3/2020 11:55 AM CST -----  Please let her know that her urine test was negative for infection.

## 2020-04-26 DIAGNOSIS — M81.0 OSTEOPOROSIS: ICD-10-CM

## 2020-04-26 RX ORDER — ALENDRONATE SODIUM 70 MG/1
TABLET ORAL
Qty: 4 TABLET | Refills: 12 | Status: SHIPPED | OUTPATIENT
Start: 2020-04-26 | End: 2020-12-07

## 2020-04-26 RX ORDER — ESOMEPRAZOLE MAGNESIUM 40 MG/1
CAPSULE, DELAYED RELEASE ORAL
Qty: 30 CAPSULE | Refills: 11 | Status: SHIPPED | OUTPATIENT
Start: 2020-04-26 | End: 2021-10-20 | Stop reason: SDUPTHER

## 2020-05-15 ENCOUNTER — OFFICE VISIT (OUTPATIENT)
Dept: PRIMARY CARE CLINIC | Facility: CLINIC | Age: 70
End: 2020-05-15
Payer: MEDICARE

## 2020-05-15 ENCOUNTER — TELEPHONE (OUTPATIENT)
Dept: PRIMARY CARE CLINIC | Facility: CLINIC | Age: 70
End: 2020-05-15

## 2020-05-15 DIAGNOSIS — K58.9 IRRITABLE BOWEL SYNDROME, UNSPECIFIED TYPE: ICD-10-CM

## 2020-05-15 DIAGNOSIS — R10.9 ABDOMINAL PAIN, UNSPECIFIED ABDOMINAL LOCATION: Primary | ICD-10-CM

## 2020-05-15 DIAGNOSIS — N39.0 URINARY TRACT INFECTION WITH HEMATURIA, SITE UNSPECIFIED: Primary | ICD-10-CM

## 2020-05-15 DIAGNOSIS — R31.9 URINARY TRACT INFECTION WITH HEMATURIA, SITE UNSPECIFIED: Primary | ICD-10-CM

## 2020-05-15 DIAGNOSIS — R10.84 GENERALIZED ABDOMINAL CRAMPING: ICD-10-CM

## 2020-05-15 PROCEDURE — 99442 PR PHYSICIAN TELEPHONE EVALUATION 11-20 MIN: ICD-10-PCS | Mod: HCNC,95,, | Performed by: FAMILY MEDICINE

## 2020-05-15 PROCEDURE — 1101F PT FALLS ASSESS-DOCD LE1/YR: CPT | Mod: HCNC,CPTII,95, | Performed by: FAMILY MEDICINE

## 2020-05-15 PROCEDURE — 1159F MED LIST DOCD IN RCRD: CPT | Mod: HCNC,95,, | Performed by: FAMILY MEDICINE

## 2020-05-15 PROCEDURE — 1159F PR MEDICATION LIST DOCUMENTED IN MEDICAL RECORD: ICD-10-PCS | Mod: HCNC,95,, | Performed by: FAMILY MEDICINE

## 2020-05-15 PROCEDURE — 1101F PR PT FALLS ASSESS DOC 0-1 FALLS W/OUT INJ PAST YR: ICD-10-PCS | Mod: HCNC,CPTII,95, | Performed by: FAMILY MEDICINE

## 2020-05-15 PROCEDURE — 99442 PR PHYSICIAN TELEPHONE EVALUATION 11-20 MIN: CPT | Mod: HCNC,95,, | Performed by: FAMILY MEDICINE

## 2020-05-15 RX ORDER — DICYCLOMINE HYDROCHLORIDE 10 MG/1
10 CAPSULE ORAL
Qty: 120 CAPSULE | Refills: 0 | Status: SHIPPED | OUTPATIENT
Start: 2020-05-15 | End: 2020-05-18 | Stop reason: ALTCHOICE

## 2020-05-15 RX ORDER — SULFAMETHOXAZOLE AND TRIMETHOPRIM 800; 160 MG/1; MG/1
1 TABLET ORAL 2 TIMES DAILY
Qty: 20 TABLET | Refills: 0 | Status: SHIPPED | OUTPATIENT
Start: 2020-05-15 | End: 2020-06-03 | Stop reason: ALTCHOICE

## 2020-05-15 NOTE — TELEPHONE ENCOUNTER
On/off pain lower left abdomen X 2 weeks.  Discussed with Dr. Rey.  Virtual audio only appt scheduled today.

## 2020-05-15 NOTE — TELEPHONE ENCOUNTER
----- Message from Garrett Ceja sent at 5/15/2020  8:19 AM CDT -----  Contact: self    Would like to get medical advice.  Symptoms (please be specific):  Stomach pains   How long has patient had these symptoms:  2 weeks   Pharmacy name and phone #:     [id number] - [place] [address] [phone] (Phone)  [phone] (Fax)  Any drug allergies (copy from chart):      Would the patient rather a call back or a response via [facility]?:  Call back   Comments:

## 2020-05-17 ENCOUNTER — TELEPHONE (OUTPATIENT)
Dept: PRIMARY CARE CLINIC | Facility: CLINIC | Age: 70
End: 2020-05-17

## 2020-05-18 ENCOUNTER — HOSPITAL ENCOUNTER (EMERGENCY)
Facility: HOSPITAL | Age: 70
Discharge: HOME | End: 2020-05-18
Attending: EMERGENCY MEDICINE
Payer: MEDICARE

## 2020-05-18 VITALS
RESPIRATION RATE: 16 BRPM | OXYGEN SATURATION: 95 % | BODY MASS INDEX: 16.56 KG/M2 | SYSTOLIC BLOOD PRESSURE: 123 MMHG | HEART RATE: 61 BPM | TEMPERATURE: 98 F | HEIGHT: 64 IN | DIASTOLIC BLOOD PRESSURE: 58 MMHG | WEIGHT: 97 LBS

## 2020-05-18 DIAGNOSIS — N13.30 HYDRONEPHROSIS, UNSPECIFIED HYDRONEPHROSIS TYPE: ICD-10-CM

## 2020-05-18 DIAGNOSIS — R10.32 LEFT LOWER QUADRANT ABDOMINAL PAIN: Primary | ICD-10-CM

## 2020-05-18 DIAGNOSIS — N39.0 URINARY TRACT INFECTION WITHOUT HEMATURIA, SITE UNSPECIFIED: ICD-10-CM

## 2020-05-18 LAB
ALBUMIN SERPL BCP-MCNC: 4.2 G/DL (ref 3.5–5.2)
ALP SERPL-CCNC: 53 U/L (ref 55–135)
ALT SERPL W/O P-5'-P-CCNC: 16 U/L (ref 10–44)
ANION GAP SERPL CALC-SCNC: 12 MMOL/L (ref 8–16)
AST SERPL-CCNC: 29 U/L (ref 10–40)
BACTERIA #/AREA URNS AUTO: ABNORMAL /HPF
BASOPHILS # BLD AUTO: 0.02 K/UL (ref 0–0.2)
BASOPHILS NFR BLD: 0.4 % (ref 0–1.9)
BILIRUB SERPL-MCNC: 0.5 MG/DL (ref 0.1–1)
BILIRUB UR QL STRIP: NEGATIVE
BUN SERPL-MCNC: 13 MG/DL (ref 8–23)
CALCIUM SERPL-MCNC: 9.2 MG/DL (ref 8.7–10.5)
CHLORIDE SERPL-SCNC: 100 MMOL/L (ref 95–110)
CLARITY UR REFRACT.AUTO: CLEAR
CO2 SERPL-SCNC: 22 MMOL/L (ref 23–29)
COLOR UR AUTO: ABNORMAL
CREAT SERPL-MCNC: 0.9 MG/DL (ref 0.5–1.4)
DIFFERENTIAL METHOD: ABNORMAL
EOSINOPHIL # BLD AUTO: 0 K/UL (ref 0–0.5)
EOSINOPHIL NFR BLD: 0 % (ref 0–8)
ERYTHROCYTE [DISTWIDTH] IN BLOOD BY AUTOMATED COUNT: 13.6 % (ref 11.5–14.5)
EST. GFR  (AFRICAN AMERICAN): >60 ML/MIN/1.73 M^2
EST. GFR  (NON AFRICAN AMERICAN): >60 ML/MIN/1.73 M^2
GLUCOSE SERPL-MCNC: 119 MG/DL (ref 70–110)
GLUCOSE UR QL STRIP: NEGATIVE
HCT VFR BLD AUTO: 39.4 % (ref 37–48.5)
HGB BLD-MCNC: 12.4 G/DL (ref 12–16)
HGB UR QL STRIP: NEGATIVE
IMM GRANULOCYTES # BLD AUTO: 0.01 K/UL (ref 0–0.04)
IMM GRANULOCYTES NFR BLD AUTO: 0.2 % (ref 0–0.5)
KETONES UR QL STRIP: NEGATIVE
LACTATE SERPL-SCNC: 2.5 MMOL/L (ref 0.5–2.2)
LEUKOCYTE ESTERASE UR QL STRIP: ABNORMAL
LYMPHOCYTES # BLD AUTO: 0.7 K/UL (ref 1–4.8)
LYMPHOCYTES NFR BLD: 12.3 % (ref 18–48)
MCH RBC QN AUTO: 29.7 PG (ref 27–31)
MCHC RBC AUTO-ENTMCNC: 31.5 G/DL (ref 32–36)
MCV RBC AUTO: 95 FL (ref 82–98)
MICROSCOPIC COMMENT: ABNORMAL
MONOCYTES # BLD AUTO: 0.5 K/UL (ref 0.3–1)
MONOCYTES NFR BLD: 9.3 % (ref 4–15)
NEUTROPHILS # BLD AUTO: 4.4 K/UL (ref 1.8–7.7)
NEUTROPHILS NFR BLD: 77.8 % (ref 38–73)
NITRITE UR QL STRIP: NEGATIVE
NRBC BLD-RTO: 0 /100 WBC
PH UR STRIP: 5 [PH] (ref 5–8)
PLATELET # BLD AUTO: 222 K/UL (ref 150–350)
PMV BLD AUTO: 10 FL (ref 9.2–12.9)
POTASSIUM SERPL-SCNC: 4.4 MMOL/L (ref 3.5–5.1)
PROT SERPL-MCNC: 7.5 G/DL (ref 6–8.4)
PROT UR QL STRIP: NEGATIVE
RBC # BLD AUTO: 4.17 M/UL (ref 4–5.4)
RBC #/AREA URNS AUTO: 1 /HPF (ref 0–4)
SODIUM SERPL-SCNC: 134 MMOL/L (ref 136–145)
SP GR UR STRIP: 1 (ref 1–1.03)
SQUAMOUS #/AREA URNS AUTO: 0 /HPF
URN SPEC COLLECT METH UR: ABNORMAL
WBC # BLD AUTO: 5.6 K/UL (ref 3.9–12.7)
WBC #/AREA URNS AUTO: 7 /HPF (ref 0–5)

## 2020-05-18 PROCEDURE — 96374 THER/PROPH/DIAG INJ IV PUSH: CPT | Mod: HCNC

## 2020-05-18 PROCEDURE — 99285 EMERGENCY DEPT VISIT HI MDM: CPT | Mod: 25,HCNC

## 2020-05-18 PROCEDURE — 25000003 PHARM REV CODE 250: Mod: HCNC | Performed by: EMERGENCY MEDICINE

## 2020-05-18 PROCEDURE — 63600175 PHARM REV CODE 636 W HCPCS: Mod: HCNC | Performed by: EMERGENCY MEDICINE

## 2020-05-18 PROCEDURE — 85025 COMPLETE CBC W/AUTO DIFF WBC: CPT | Mod: HCNC

## 2020-05-18 PROCEDURE — 96375 TX/PRO/DX INJ NEW DRUG ADDON: CPT | Mod: HCNC

## 2020-05-18 PROCEDURE — 96372 THER/PROPH/DIAG INJ SC/IM: CPT | Mod: 59,HCNC

## 2020-05-18 PROCEDURE — 96361 HYDRATE IV INFUSION ADD-ON: CPT | Mod: HCNC

## 2020-05-18 PROCEDURE — 80053 COMPREHEN METABOLIC PANEL: CPT | Mod: HCNC

## 2020-05-18 PROCEDURE — 83605 ASSAY OF LACTIC ACID: CPT | Mod: HCNC

## 2020-05-18 PROCEDURE — 81001 URINALYSIS AUTO W/SCOPE: CPT | Mod: HCNC

## 2020-05-18 PROCEDURE — 99285 PR EMERGENCY DEPT VISIT,LEVEL V: ICD-10-PCS | Mod: HCNC,,, | Performed by: EMERGENCY MEDICINE

## 2020-05-18 PROCEDURE — 99285 EMERGENCY DEPT VISIT HI MDM: CPT | Mod: HCNC,,, | Performed by: EMERGENCY MEDICINE

## 2020-05-18 RX ORDER — ONDANSETRON 4 MG/1
4 TABLET, ORALLY DISINTEGRATING ORAL EVERY 6 HOURS PRN
Qty: 20 TABLET | Refills: 0 | Status: SHIPPED | OUTPATIENT
Start: 2020-05-18 | End: 2022-04-06 | Stop reason: ALTCHOICE

## 2020-05-18 RX ORDER — KETOROLAC TROMETHAMINE 30 MG/ML
10 INJECTION, SOLUTION INTRAMUSCULAR; INTRAVENOUS
Status: COMPLETED | OUTPATIENT
Start: 2020-05-18 | End: 2020-05-18

## 2020-05-18 RX ORDER — PROMETHAZINE HYDROCHLORIDE 25 MG/ML
25 INJECTION, SOLUTION INTRAMUSCULAR; INTRAVENOUS
Status: COMPLETED | OUTPATIENT
Start: 2020-05-18 | End: 2020-05-18

## 2020-05-18 RX ORDER — ONDANSETRON 4 MG/1
4 TABLET, ORALLY DISINTEGRATING ORAL
Status: COMPLETED | OUTPATIENT
Start: 2020-05-18 | End: 2020-05-18

## 2020-05-18 RX ORDER — MORPHINE SULFATE 4 MG/ML
4 INJECTION, SOLUTION INTRAMUSCULAR; INTRAVENOUS
Status: COMPLETED | OUTPATIENT
Start: 2020-05-18 | End: 2020-05-18

## 2020-05-18 RX ORDER — ONDANSETRON 4 MG/1
4 TABLET, ORALLY DISINTEGRATING ORAL EVERY 6 HOURS PRN
Qty: 20 TABLET | Refills: 0 | Status: SHIPPED | OUTPATIENT
Start: 2020-05-18 | End: 2020-05-18 | Stop reason: SDUPTHER

## 2020-05-18 RX ORDER — TRAMADOL HYDROCHLORIDE 50 MG/1
50 TABLET ORAL EVERY 6 HOURS PRN
Qty: 12 TABLET | Refills: 0 | Status: SHIPPED | OUTPATIENT
Start: 2020-05-18 | End: 2020-05-21

## 2020-05-18 RX ADMIN — MORPHINE SULFATE 4 MG: 4 INJECTION, SOLUTION INTRAMUSCULAR; INTRAVENOUS at 04:05

## 2020-05-18 RX ADMIN — KETOROLAC TROMETHAMINE 10 MG: 30 INJECTION, SOLUTION INTRAMUSCULAR at 03:05

## 2020-05-18 RX ADMIN — ONDANSETRON 4 MG: 4 TABLET, ORALLY DISINTEGRATING ORAL at 06:05

## 2020-05-18 RX ADMIN — SODIUM CHLORIDE 1000 ML: 0.9 INJECTION, SOLUTION INTRAVENOUS at 03:05

## 2020-05-18 RX ADMIN — PROMETHAZINE HYDROCHLORIDE 25 MG: 25 INJECTION INTRAMUSCULAR; INTRAVENOUS at 08:05

## 2020-05-18 NOTE — ED PROVIDER NOTES
Encounter Date: 5/18/2020       History     Chief Complaint   Patient presents with    Abdominal Pain     L lower abd pain stared 2 weeks ago     71 yo f, healthy, c/o abd pain x 2 weeks, LLQ, initially intermittent but over past day has become constant and more severe.  She called her PCP about it on 5/15 and was rx'd bactrim for possible UTI.  Pt does report some urinary discomfort ax with pain, feels suprapubic pressure ax w pain sometimes.  No flank pain. Over past day has started to have subj fever/chills.  No v/d/constipation.      The history is provided by the patient.     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 No pertinent surgical history.  Family History   Problem Relation Age of Onset    Cataracts Mother     Cancer Father         leukemia    Amblyopia Neg Hx     Blindness Neg Hx     Diabetes Neg Hx     Glaucoma Neg Hx     Hypertension Neg Hx     Macular degeneration Neg Hx     Retinal detachment Neg Hx     Strabismus Neg Hx     Stroke Neg Hx     Thyroid disease Neg Hx     Breast cancer Neg Hx     Colon cancer Neg Hx     Ovarian cancer Neg Hx      Social History     Tobacco Use    Smoking status: Never Smoker    Smokeless tobacco: Never Used   Substance Use Topics    Alcohol use: No    Drug use: No     Review of Systems   Constitutional: Positive for chills, fatigue and fever. Negative for diaphoresis.   Respiratory: Negative for shortness of breath.    Cardiovascular: Negative for chest pain.   Gastrointestinal: Positive for abdominal pain. Negative for constipation, diarrhea and vomiting.   Genitourinary: Positive for difficulty urinating. Negative for flank pain.   All other  systems reviewed and are negative.      Physical Exam     Initial Vitals   BP Pulse Resp Temp SpO2   05/18/20 1450 05/18/20 1528 05/18/20 1450 05/18/20 1450 05/18/20 1450   138/64 89 18 99.2 °F (37.3 °C) 96 %      MAP       --                Physical Exam    Nursing note and vitals reviewed.  Constitutional: Vital signs are normal. She appears well-developed and well-nourished. She is not diaphoretic.  Non-toxic appearance. She does not appear ill. No distress.   HENT:   Head: Normocephalic and atraumatic.   Mouth/Throat: Uvula is midline, oropharynx is clear and moist and mucous membranes are normal.   Eyes: Conjunctivae and lids are normal.   Neck: Normal range of motion. Neck supple.   Cardiovascular: Normal rate, regular rhythm, S1 normal and S2 normal.   No murmur heard.  Pulmonary/Chest: No respiratory distress.   Abdominal: Soft. Normal appearance. She exhibits no distension, no ascites and no mass. There is no tenderness. There is no rebound and no guarding.   Musculoskeletal: She exhibits no edema.   Neurological: She is alert and oriented to person, place, and time. She has normal strength. No cranial nerve deficit.   Skin: Skin is warm, dry and intact. No rash noted. No cyanosis. No pallor.   Psychiatric: She has a normal mood and affect. Her speech is normal and behavior is normal. She is not actively hallucinating. She is attentive.         ED Course   Procedures  Labs Reviewed   CBC W/ AUTO DIFFERENTIAL - Abnormal; Notable for the following components:       Result Value    Mean Corpuscular Hemoglobin Conc 31.5 (*)     Lymph # 0.7 (*)     Gran% 77.8 (*)     Lymph% 12.3 (*)     All other components within normal limits   COMPREHENSIVE METABOLIC PANEL - Abnormal; Notable for the following components:    Sodium 134 (*)     CO2 22 (*)     Glucose 119 (*)     Alkaline Phosphatase 53 (*)     All other components within normal limits   URINALYSIS, REFLEX TO URINE CULTURE - Abnormal; Notable for the following  components:    Leukocytes, UA 3+ (*)     All other components within normal limits    Narrative:     Preferred Collection Type->Urine, Clean Catch   LACTIC ACID, PLASMA - Abnormal; Notable for the following components:    Lactate (Lactic Acid) 2.5 (*)     All other components within normal limits   URINALYSIS MICROSCOPIC - Abnormal; Notable for the following components:    WBC, UA 7 (*)     All other components within normal limits    Narrative:     Preferred Collection Type->Urine, Clean Catch          Imaging Results          CT Renal Stone Study ABD Pelvis WO (Final result)  Result time 05/18/20 16:58:31    Final result by Serjio Ayers MD (05/18/20 16:58:31)                 Impression:      1. Moderate bilateral hydronephrosis without significant renal dilation.  The urinary bladder is distended without wall thickening.  Renal findings could reflect sequela of reflux versus chronic UPJ obstruction.  2. The suspected appendix is prominent and fluid-filled although no surrounding inflammation.  Clinical correlation with any focal tenderness in the right quadrant as warranted.  3. Moderate to large amount of stool within the colon, developing constipation is a consideration.  4. Additional findings above.      Electronically signed by: Serjio Ayers MD  Date:    05/18/2020  Time:    16:58             Narrative:    EXAMINATION:  CT RENAL STONE STUDY ABD PELVIS WO    CLINICAL HISTORY:  Flank pain, stone disease suspected;    TECHNIQUE:  Low dose axial images, sagittal and coronal reformations were obtained from the lung bases to the pubic symphysis.  Contrast was not administered.    COMPARISON:  02/25/2015    FINDINGS:  Images of the lower thorax are remarkable for minimal dependent atelectasis.    The liver, spleen, pancreas, gallbladder and adrenal glands have a grossly unremarkable noncontrast appearance.  There is no biliary dilation or ascites.  The pancreatic duct is not dilated.    There is mild to  moderate left hydronephrosis without significant ureteral dilation.  The left ureter is unable to be followed in its entirety to the urinary bladder, no definite calculi seen along its course.  There is moderate right hydronephrosis noting the right ureter is not dilated.  The right ureter cannot be followed in its entirety to the urinary bladder, no definite calculi seen along its course.  The urinary bladder is distended without wall thickening.  The uterus and adnexa is unremarkable.    There are a few scattered colonic diverticula without inflammation.  There is moderate stool throughout the colon.  The terminal ileum is unremarkable.  The appendix is questionably identified noting it appears fluid-filled without surrounding inflammation although is mildly prominent measuring up to 1 cm.  The small bowel is grossly unremarkable.  There is atherosclerotic calcification of the aorta and its branches.  No focal organized pelvic fluid collection.    Degenerative changes are noted of the spine.  There is osteopenia.                                 Medical Decision Making:   History:   Old Medical Records: I decided to obtain old medical records.  Initial Assessment:   69 yo f, LLQ abd pain    VSS - though still awaiting pulse    abd exam benign  Differential Diagnosis:   Renal colic/pyelo?  Diverticulitis a concern though less likely given lack of focal tenderness  Hernia  Ovarian pathology/mass  Clinical Tests:   Lab Tests: Ordered and Reviewed  Radiological Study: Reviewed and Ordered  Medical Tests: Ordered and Reviewed  ED Management:  Labs  Morphine/toradol  CT abd/pelvis  dispo uncertain pending test results                   ED Course as of May 19 1446   Mon May 18, 2020   1747 As to follow up on a CT scan by Dr. Hernandez.  She was suspecting kidney stone as the cause of her intermittent left lower quadrant abdominal pain more than she was infection.  CT scan is noted to have bilateral hydronephrosis DDX  includes reflux versus chronic UPJ obstruction.  Patient pointed to her left lower quadrant as a place for pain, but on my exam her abdominal exam was benign.  She had no CVA tenderness palpation or percussion.  Patient is nontoxic, although her lactic is elevated, her vital signs are without evidence of sepsis, and she has a normal white blood cell count.  Patient does have evidence for UTI, she is currently on antibiotics which I encouraged her to finish.  We discussed her following up with Urology.  We discussed possibility of constipation, but the patient reports that she is regular with her bowel movements.  Educated on warning signs and symptoms which she must seek immediate medical attention.    [MA]   1916 After patient was given pain medication she became nauseated, for which she was given Zofran 0 DT since the IV had been removed.  Patient continues to have some nausea but she wishes to be discharged home.  She agreed to have 1 more dose of medication before she left.    [MA]      ED Course User Index  [MA] Ryan Agee MD                Clinical Impression:       ICD-10-CM ICD-9-CM   1. Left lower quadrant abdominal pain R10.32 789.04   2. Urinary tract infection without hematuria, site unspecified N39.0 599.0   3. Hydronephrosis, unspecified hydronephrosis type N13.30 591             ED Disposition Condition    Discharge Stable        ED Prescriptions     Medication Sig Dispense Start Date End Date Auth. Provider    ondansetron (ZOFRAN-ODT) 4 MG TbDL  (Status: Discontinued) Take 1 tablet (4 mg total) by mouth every 6 (six) hours as needed. 20 tablet 5/18/2020 5/18/2020 Ryan Agee MD    traMADoL (ULTRAM) 50 mg tablet Take 1 tablet (50 mg total) by mouth every 6 (six) hours as needed for Pain. 12 tablet 5/18/2020 5/21/2020 Ryan Agee MD    ondansetron (ZOFRAN-ODT) 4 MG TbDL Take 1 tablet (4 mg total) by mouth every 6 (six) hours as needed. 20 tablet 5/18/2020  Ryan Agee MD         Follow-up Information     Follow up With Specialties Details Why Contact Info Additional Information    Michelle Bravo MD Family Medicine Schedule an appointment as soon as possible for a visit in 3 days For follow-up and re-evaluation of abdominal pain [address] [facility] [address] [facility]  [address]  [facility] [address]  [phone]       [facility] [address] - Urology 4th Floor Urology Schedule an appointment as soon as possible for a visit in 3 days For follow-up and re-evaluation of hydronephrosis and urinary tract infection [id number] [facility] [id number] [phone] [facility] - 4th Floor                                     Pebbles Hernandez MD  05/19/20 1677

## 2020-05-18 NOTE — ED TRIAGE NOTES
Pt arrived from home with c/o of constant left sided abd pain 9/10 sharp squeezing pain for the past 2 weeks. Pt had telehealth appt with PCP on Friday and prescribed Bactrim and Bentyl without relief. Still having pain. Pt having normal bowel movements. Denies dysuria.

## 2020-05-18 NOTE — PROGRESS NOTES
Subjective:    The patient location is: [facility] 05/15/2020  The chief complaint leading to consultation is:  Abdominal pain  Not relieved by bowel movement, no fever, no chills  No diarrhea , no contipation.  Visit type: audio only  12 minutes of total time spent on the encounter, which includes face to face time and non-face to face time preparing to see the patient (eg, review of tests), Obtaining and/or reviewing separately obtained history, Documenting clinical information in the electronic or other health record, Independently interpreting results (not separately reported) and communicating results to the patient/family/caregiver, or Care coordination (not separately reported).   Each patient to whom he or she provides medical services by telemedicine is:  (1) informed of the relationship between the physician and patient and the respective role of any other health care provider with respect to management of the patient; and (2) notified that he or she may decline to receive medical services by telemedicine and may withdraw from such care at any time.    Notes:    Patient ID: Marshall Almanza is a 70 y.o. female.    Chief Complaint:  Left lower quadrant abdominal pain x2 weeks getting slightly worse  The patient denies fever chills diarrhea or constipation.  Patient's appetite is been unaffected  Review of Systems   Constitutional: Negative.    HENT: Negative.    Eyes: Negative.    Respiratory: Negative.    Cardiovascular: Negative.    Gastrointestinal: Positive for abdominal pain. Negative for blood in stool, constipation, diarrhea, nausea, rectal pain and vomiting.   Endocrine: Negative.    Genitourinary: Negative.    Musculoskeletal: Negative.    Skin: Negative.    Allergic/Immunologic: Negative.    Neurological: Negative.    Hematological: Negative.    Psychiatric/Behavioral: Negative.        Objective:      Physical Exam   Constitutional: She is oriented to person, place, and time. She appears  distressed.   HENT:   Head: Atraumatic.   Neck: No JVD present.   Pulmonary/Chest: Effort normal. No stridor. No respiratory distress. She has no wheezes. She has no rales.   Abdominal: Soft. She exhibits no distension and no mass. There is tenderness. No hernia.   Neurological: She is alert and oriented to person, place, and time.   Skin: Skin is warm and dry. No rash noted. She is not diaphoretic. No erythema. No pallor.   Psychiatric: She has a normal mood and affect. Her behavior is normal. Thought content normal.       Assessment:   .abdominal pain  Possible diverticuliitis  Possible uti  Plan:   Possible uti vs diverticulitis   sulfamethoxazole-trimethoprim 800-160mg (BACTRIM DS) 800-160 mg Tab 1 tablet, 2 times daily      Left lower quadrant pain           Gastric Acid Secretion Reducing Agents - Proton Pump Inhibitors (PPIs)    esomeprazole (NEXIUM) 40 MG capsule       GI Antispasmodic - Synthetic Tertiary Amines    dicyclomine (BENTYL) 10 MG capsule 10 mg, Before meals & nightly         Pt will go to ER if the medications don't seem to be helping or the pain is getting worse.

## 2020-05-18 NOTE — TELEPHONE ENCOUNTER
I told this pt and she agreed that if the medication didn't work she needed to go to the ER. She described her pain as a 7-8 and I told her that if she had no improvement with meds she needed go to ER for an evaluation

## 2020-05-18 NOTE — TELEPHONE ENCOUNTER
----- Message from Joleen Shaw sent at 5/18/2020  8:38 AM CDT -----  Contact: Pt self Mobile/Home [phone]    Patient is calling in regards to her wanting to put in an order for a blood test, and a ultrasound due to her having pain in the lower left side of her abdominal.   She also said that the medications dicyclomine (BENTYL) 10 MG capsule and bacitracin-polymyxin b (POLYSPORIN) ointment that you gave her is not working for her.

## 2020-05-18 NOTE — DISCHARGE INSTRUCTIONS
Hydronephrosis    Hydronephrosis is the swelling of a kidney due to a build-up of urine. It happens when urine cannot drain out from the kidney to the bladder from a blockage or obstruction. Hydronephrosis can occur in one or both kidneys.    The main function of the urinary tract is to remove wastes and fluid from the body. The urinary tract has four parts: the kidneys, the ureters, the bladder and urethra. The urine is formed when the kidneys filter blood and remove excess waste materials and fluid. Urine collects into a part of the kidney called the renal pelvis. From the renal pelvis, the urine travels down a narrow tube called the ureter into the bladder. The bladder slowly fills up with urine, which empties from the body through another small tube called the urethra. Hydronephrosis occurs when there is either a blockage of the outflow of urine, or reverse flow of urine already in the bladder (called reflux) that can cause the renal pelvis to become enlarged.    Hydronephrosis may or may not cause symptoms. The main symptom is pain, either in the side and back (known as flank pain), abdomen or groin. Other symptoms can include pain during urination, other problems with urination (increased urge or frequency, incomplete urination, incontinence), nausea and fever. These symptoms depend on the cause and severity of urinary blockage.    How is Hydronephrosis Caused?  Hydronephrosis is usually caused by another underlying illness or risk factor. Causes of hydronephrosis include, but are not limited to, the following illnesses or risk factors:    Kidney stone  Congenital blockage (a defect that is present at birth)  Blood clot  Scarring of tissue (from injury or previous surgery)  Tumor or cancer (examples include bladder, cervical, colon, or prostate)  Enlarged prostate (noncancerous)  Pregnancy  Urinary tract infection (or other diseases that cause inflammation of the urinary tract)  How is Hydronephrosis  Diagnosed?  An ultrasound is typically used to confirm a diagnosis. This procedure uses sound waves to create an image of your kidneys. A doctor can also confirm a diagnosis with x-rays, computerized tomography (CT) and magnetic resonance imaging (MRI). Diagnosis could also involve a cystoscopy, which uses a long tube with a light and camera at the end (cytoscope) that allows the doctor to look inside the bladder and urethra.    Blood and urine tests can also check kidney function. The doctor can also check for blood in the urine, which can be caused by a kidney stone, infection or other factor.    How is Hydronephrosis Treated?  Hydronephrosis is usually treated by addressing the underlying disease or cause, such as a kidney stone or infection. Some cases can be resolved without surgery. Infections can be treated with antibiotics. A kidney stone can pass through by itself or might be severe enough to require removal with surgery.    In cases of severe blockage and hydronephrosis, excess urine may need to be removed using either a catheter to drain urine from the bladder or a special tube called a nephrostomy that drains urine from the kidney. The key to treatment is to get it addressed as soon as possible in order to avoid any permanent damage to the kidneys.    Severe cases of urinary blockage and hydronephrosis can damage the kidneys and lead to kidney failure. If kidney failure occurs, treatment will be needed with either dialysis or a kidney transplant. However, most people can recover from hydronephrosis if treated promptly.         If you would like more information, please contact us.    © 2015 [employer]. All rights reserved. This material does not constitute medical advice. It is intended for informational purposes only. Please consult a physician for specific treatment recommendations.

## 2020-05-18 NOTE — PROVIDER PROGRESS NOTES - EMERGENCY DEPT.
Emergency Department TeleTRIAGE Encounter Note      CHIEF COMPLAINT    Chief Complaint   Patient presents with    Abdominal Pain     L lower abd pain stared 2 weeks ago       VITAL SIGNS   Initial Vitals [05/18/20 1450]   BP Pulse Resp Temp SpO2   138/64 -- 18 99.2 °F (37.3 °C) 96 %      MAP       --            ALLERGIES    Review of patient's allergies indicates:   Allergen Reactions    Ciprofloxacin      palpitations    Flagyl [metronidazole hcl]      Cause urinary frequency       PROVIDER TRIAGE NOTE  The patient presents with a 2 week history of left lower abdominal pain.  Initially, she states the pain was intermittent.  Over the past few days, the pain has been constant.  She was seen by her PCP via a telemedicine appointment.  She was started on Bentyl without relief of her symptoms.  Over the past 4 days, the pain has progressively worsened.  She denies any associated nausea, vomiting, or diarrhea.  She also denies constipation.  She states that she does have some urinary symptoms when she has very severe left lower quadrant abdominal pain.      ORDERS  Labs Reviewed   CBC W/ AUTO DIFFERENTIAL   COMPREHENSIVE METABOLIC PANEL   URINALYSIS, REFLEX TO URINE CULTURE   LACTIC ACID, PLASMA   PROTIME-INR       ED Orders (720h ago, onward)    Start Ordered     Status Ordering Provider    05/18/20 1500 05/18/20 1455  sodium chloride 0.9% bolus 1,000 mL  Once      Ordered ADE MELENDEZ    05/18/20 1455 05/18/20 1455  Saline lock IV  Once      Ordered ADE MELENDEZ    05/18/20 1455 05/18/20 1455  CBC auto differential  STAT  Collect    Ordered ADE MELENDEZ    05/18/20 1455 05/18/20 1455  Comprehensive metabolic panel  STAT  Collect    Ordered ADE MELENDEZ    05/18/20 1455 05/18/20 1455  Urinalysis, Reflex to Urine Culture Urine, Clean Catch  STAT      Ordered ADE MELENDEZ    05/18/20 1455 05/18/20 1455  Lactic acid, plasma  STAT  Collect    Ordered ADE MELENDEZ    05/18/20 1455 05/18/20 1455   Protime-INR  STAT  Collect    Ordered ADE MELENDEZ            Virtual Visit Note: The provider triage portion of this emergency department evaluation and documentation was performed via [employer], a HIPAA-compliant telemedicine application, in concert with a tele-presenter in the room. A face to face patient evaluation with one of my colleagues will occur once the patient is placed in an emergency department room.      DISCLAIMER: This note was prepared with [employer] voice recognition transcription software. Garbled syntax, mangled pronouns, and other bizarre constructions may be attributed to that software system.

## 2020-05-19 ENCOUNTER — PATIENT OUTREACH (OUTPATIENT)
Dept: ADMINISTRATIVE | Facility: OTHER | Age: 70
End: 2020-05-19

## 2020-05-20 ENCOUNTER — OFFICE VISIT (OUTPATIENT)
Dept: UROLOGY | Facility: CLINIC | Age: 70
End: 2020-05-20
Payer: MEDICARE

## 2020-05-20 VITALS
DIASTOLIC BLOOD PRESSURE: 76 MMHG | HEART RATE: 101 BPM | SYSTOLIC BLOOD PRESSURE: 126 MMHG | BODY MASS INDEX: 16.88 KG/M2 | WEIGHT: 98.31 LBS

## 2020-05-20 DIAGNOSIS — R10.32 LEFT LOWER QUADRANT ABDOMINAL PAIN: Primary | ICD-10-CM

## 2020-05-20 DIAGNOSIS — N13.30 HYDRONEPHROSIS, UNSPECIFIED HYDRONEPHROSIS TYPE: ICD-10-CM

## 2020-05-20 PROBLEM — R10.9 ABDOMINAL PAIN: Status: ACTIVE | Noted: 2020-05-20

## 2020-05-20 PROCEDURE — 1101F PT FALLS ASSESS-DOCD LE1/YR: CPT | Mod: HCNC,CPTII,S$GLB, | Performed by: UROLOGY

## 2020-05-20 PROCEDURE — 99203 PR OFFICE/OUTPT VISIT, NEW, LEVL III, 30-44 MIN: ICD-10-PCS | Mod: HCNC,S$GLB,, | Performed by: UROLOGY

## 2020-05-20 PROCEDURE — 99999 PR PBB SHADOW E&M-EST. PATIENT-LVL III: CPT | Mod: PBBFAC,HCNC,, | Performed by: UROLOGY

## 2020-05-20 PROCEDURE — 1159F MED LIST DOCD IN RCRD: CPT | Mod: HCNC,S$GLB,, | Performed by: UROLOGY

## 2020-05-20 PROCEDURE — 1159F PR MEDICATION LIST DOCUMENTED IN MEDICAL RECORD: ICD-10-PCS | Mod: HCNC,S$GLB,, | Performed by: UROLOGY

## 2020-05-20 PROCEDURE — 1101F PR PT FALLS ASSESS DOC 0-1 FALLS W/OUT INJ PAST YR: ICD-10-PCS | Mod: HCNC,CPTII,S$GLB, | Performed by: UROLOGY

## 2020-05-20 PROCEDURE — 99999 PR PBB SHADOW E&M-EST. PATIENT-LVL III: ICD-10-PCS | Mod: PBBFAC,HCNC,, | Performed by: UROLOGY

## 2020-05-20 PROCEDURE — 99203 OFFICE O/P NEW LOW 30 MIN: CPT | Mod: HCNC,S$GLB,, | Performed by: UROLOGY

## 2020-05-20 NOTE — PROGRESS NOTES
"Urology - [facility]      SUBJECTIVE:     Chief Complaint: abdominal pain    History of Present Illness:  Marshall Almanza is a 70-year-old female who comes in to the Urology Clinic after being evaluated in the ER for left lower quadrant abdominal pain and bilateral hydronephrosis seen on CT scan.  She was initially seen by her PCP several days ago prescribed some medication for her abdominal pain and antibiotics for possible UTI (UA showed 1 rbc and 7 wbc, rare bacteria). When the medication did not relieve her pain she was told to go to the ER. She did not have fever or chills. In the ER she had a CT scan done which shows bilateral mild hydronephrosis, no calyceal dilation and no ureteral dilation and a partially distended bladder. There does not appear to be any nephrolithiasis.  She has no urologic history. Kidney function and WBC normal.  She does have a previous CT scan on file from 2015 which shows a similar and hydronephrosis bilaterally, indicating this is a chronic finding.  She does struggle with occasional gas and bloating, and states that her current abdominal pain "comes and goes" and does not radiate.  She normally has 1 bowel movement a day and sometimes struggles with constipation.  She takes a probiotic.  She normally voids every 2-3 hours and feels like she completely empties her bladder. PVR in clinic today is 2cc.       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 No pertinent surgical history.  Family History   Problem Relation Age of Onset    Cataracts Mother     Cancer Father         leukemia    Amblyopia Neg Hx     Blindness Neg Hx     Diabetes Neg Hx     Glaucoma Neg Hx     " "Hypertension Neg Hx     Macular degeneration Neg Hx     Retinal detachment Neg Hx     Strabismus Neg Hx     Stroke Neg Hx     Thyroid disease Neg Hx     Breast cancer Neg Hx     Colon cancer Neg Hx     Ovarian cancer Neg Hx      Social History     Tobacco Use    Smoking status: Never Smoker    Smokeless tobacco: Never Used   Substance Use Topics    Alcohol use: No    Drug use: No        Review of Systems   Constitutional: Negative for activity change, appetite change, chills, fatigue and fever.   HENT: Negative.    Eyes: Negative.    Respiratory: Negative for chest tightness and shortness of breath.    Cardiovascular: Negative for chest pain.   Gastrointestinal: Positive for abdominal pain and constipation. Negative for abdominal distention, diarrhea and vomiting.   Genitourinary: Positive for urgency. Negative for difficulty urinating, flank pain and hematuria.   Musculoskeletal: Negative.    Skin: Negative.    Neurological: Negative.    Psychiatric/Behavioral: Negative.        OBJECTIVE:     Anticoagulation:  No    Estimated body mass index is 16.88 kg/m² as calculated from the following:    Height as of 5/18/20: 5' 4" (1.626 m).    Weight as of this encounter: 44.6 kg (98 lb 5.2 oz).    Vital Signs (Most Recent)  Pulse: 101 (05/20/20 1400)  BP: 126/76 (05/20/20 1400)    Physical Exam   Constitutional: No distress.   HENT:   Head: Normocephalic and atraumatic.   Eyes: Conjunctivae are normal. No scleral icterus.   Neck: Normal range of motion.   Cardiovascular: Normal rate.    Pulmonary/Chest: Effort normal. No respiratory distress.   Abdominal: Soft. She exhibits no distension. There is no tenderness. There is no rebound and no guarding.   No abdominal distention, no reproducible LLQ pain  No cvat bilaterally  No suprapubic fullness   Musculoskeletal: Normal range of motion.   Neurological: She is alert.   Skin: Skin is warm and dry. She is not diaphoretic.     Psychiatric: She has a normal mood and " affect.       BMP  Lab Results   Component Value Date     (L) 05/18/2020    K 4.4 05/18/2020     05/18/2020    CO2 22 (L) 05/18/2020    BUN 13 05/18/2020    CREATININE 0.9 05/18/2020    CALCIUM 9.2 05/18/2020    ANIONGAP 12 05/18/2020    ESTGFRAFRICA >60.0 05/18/2020    EGFRNONAA >60.0 05/18/2020       Lab Results   Component Value Date    WBC 5.60 05/18/2020    HGB 12.4 05/18/2020    HCT 39.4 05/18/2020    MCV 95 05/18/2020     05/18/2020       Lab Results   Component Value Date    PSA <0.01 09/24/2014       Imaging:    See HPI: CTRSS personally reviewed    ASSESSMENT     1. Left lower quadrant abdominal pain    2. Hydronephrosis, unspecified hydronephrosis type        PLAN:     - I discussed with the patient that I do not believe her pain is urologic in origin.  Her renal pelvis fullness appears to be chronic and stable since at least 2015. Her kidney function is normal and she is not having flank pain.  - Discussed she has some stool burden in her colon on the CT scan. Recommended daily dose of miralax to soften stool  - Will put in referral to GI for abdominal pain    MD Dr. Sav Martinez saw and evaluated patient and agrees with treatment evaluation and plan.   I think that cause of patients pain is not urologic in nature. She does not appear to have had a UTI as urine looked clear.   Mild hydronephrosis stable since 2015. Films all personally reviewed.

## 2020-05-22 ENCOUNTER — TELEPHONE (OUTPATIENT)
Dept: OBSTETRICS AND GYNECOLOGY | Facility: CLINIC | Age: 70
End: 2020-05-22

## 2020-05-22 NOTE — TELEPHONE ENCOUNTER
----- Message from Francisco Javier Donohue sent at 5/22/2020 10:57 AM CDT -----  PLEASE CALL PT REGARDING HER RX IT IS TOO EXPENSE SHE NEEDS SOMETHING ELSE [phone]

## 2020-05-22 NOTE — TELEPHONE ENCOUNTER
Called pt. Pt states she would like to go back to taking Estrace due to the price of Yuvafem. Informed pt that Dr Swann is out of the office on today and message will be forwarded.

## 2020-05-27 DIAGNOSIS — N95.2 POSTMENOPAUSAL ATROPHIC VAGINITIS: Primary | ICD-10-CM

## 2020-05-27 RX ORDER — ESTRADIOL 0.1 MG/G
0.5 CREAM VAGINAL
Qty: 42.5 G | Refills: 1 | Status: SHIPPED | OUTPATIENT
Start: 2020-05-28 | End: 2021-10-20 | Stop reason: SDUPTHER

## 2020-06-03 ENCOUNTER — OFFICE VISIT (OUTPATIENT)
Dept: OBSTETRICS AND GYNECOLOGY | Facility: CLINIC | Age: 70
End: 2020-06-03
Payer: MEDICARE

## 2020-06-03 ENCOUNTER — PATIENT OUTREACH (OUTPATIENT)
Dept: ADMINISTRATIVE | Facility: OTHER | Age: 70
End: 2020-06-03

## 2020-06-03 VITALS — SYSTOLIC BLOOD PRESSURE: 110 MMHG | WEIGHT: 97.44 LBS | DIASTOLIC BLOOD PRESSURE: 66 MMHG | BODY MASS INDEX: 16.73 KG/M2

## 2020-06-03 DIAGNOSIS — N95.2 VAGINAL ATROPHY: ICD-10-CM

## 2020-06-03 DIAGNOSIS — R31.9 URINARY TRACT INFECTION WITH HEMATURIA, SITE UNSPECIFIED: Primary | ICD-10-CM

## 2020-06-03 DIAGNOSIS — N39.0 URINARY TRACT INFECTION WITH HEMATURIA, SITE UNSPECIFIED: Primary | ICD-10-CM

## 2020-06-03 LAB
BILIRUB SERPL-MCNC: NORMAL MG/DL
BLOOD URINE, POC: 250
CLARITY, POC UA: ABNORMAL
COLOR, POC UA: ABNORMAL
GLUCOSE UR QL STRIP: ABNORMAL
KETONES UR QL STRIP: NORMAL
LEUKOCYTE ESTERASE URINE, POC: ABNORMAL
NITRITE, POC UA: NEGATIVE
PH, POC UA: 5
PROTEIN, POC: ABNORMAL
SPECIFIC GRAVITY, POC UA: 1.02
UROBILINOGEN, POC UA: NORMAL

## 2020-06-03 PROCEDURE — 87186 SC STD MICRODIL/AGAR DIL: CPT | Mod: HCNC

## 2020-06-03 PROCEDURE — 1126F PR PAIN SEVERITY QUANTIFIED, NO PAIN PRESENT: ICD-10-PCS | Mod: HCNC,S$GLB,, | Performed by: STUDENT IN AN ORGANIZED HEALTH CARE EDUCATION/TRAINING PROGRAM

## 2020-06-03 PROCEDURE — 1101F PT FALLS ASSESS-DOCD LE1/YR: CPT | Mod: HCNC,CPTII,S$GLB, | Performed by: STUDENT IN AN ORGANIZED HEALTH CARE EDUCATION/TRAINING PROGRAM

## 2020-06-03 PROCEDURE — 1159F PR MEDICATION LIST DOCUMENTED IN MEDICAL RECORD: ICD-10-PCS | Mod: HCNC,S$GLB,, | Performed by: STUDENT IN AN ORGANIZED HEALTH CARE EDUCATION/TRAINING PROGRAM

## 2020-06-03 PROCEDURE — 1126F AMNT PAIN NOTED NONE PRSNT: CPT | Mod: HCNC,S$GLB,, | Performed by: STUDENT IN AN ORGANIZED HEALTH CARE EDUCATION/TRAINING PROGRAM

## 2020-06-03 PROCEDURE — 81002 POCT URINE DIPSTICK WITHOUT MICROSCOPE: ICD-10-PCS | Mod: HCNC,S$GLB,, | Performed by: STUDENT IN AN ORGANIZED HEALTH CARE EDUCATION/TRAINING PROGRAM

## 2020-06-03 PROCEDURE — 99213 PR OFFICE/OUTPT VISIT, EST, LEVL III, 20-29 MIN: ICD-10-PCS | Mod: 25,HCNC,S$GLB, | Performed by: STUDENT IN AN ORGANIZED HEALTH CARE EDUCATION/TRAINING PROGRAM

## 2020-06-03 PROCEDURE — 87481 CANDIDA DNA AMP PROBE: CPT | Mod: 59,HCNC

## 2020-06-03 PROCEDURE — 99999 PR PBB SHADOW E&M-EST. PATIENT-LVL III: ICD-10-PCS | Mod: PBBFAC,HCNC,, | Performed by: STUDENT IN AN ORGANIZED HEALTH CARE EDUCATION/TRAINING PROGRAM

## 2020-06-03 PROCEDURE — 1159F MED LIST DOCD IN RCRD: CPT | Mod: HCNC,S$GLB,, | Performed by: STUDENT IN AN ORGANIZED HEALTH CARE EDUCATION/TRAINING PROGRAM

## 2020-06-03 PROCEDURE — 99213 OFFICE O/P EST LOW 20 MIN: CPT | Mod: 25,HCNC,S$GLB, | Performed by: STUDENT IN AN ORGANIZED HEALTH CARE EDUCATION/TRAINING PROGRAM

## 2020-06-03 PROCEDURE — 87088 URINE BACTERIA CULTURE: CPT | Mod: HCNC

## 2020-06-03 PROCEDURE — 87661 TRICHOMONAS VAGINALIS AMPLIF: CPT | Mod: HCNC

## 2020-06-03 PROCEDURE — 1101F PR PT FALLS ASSESS DOC 0-1 FALLS W/OUT INJ PAST YR: ICD-10-PCS | Mod: HCNC,CPTII,S$GLB, | Performed by: STUDENT IN AN ORGANIZED HEALTH CARE EDUCATION/TRAINING PROGRAM

## 2020-06-03 PROCEDURE — 99999 PR PBB SHADOW E&M-EST. PATIENT-LVL III: CPT | Mod: PBBFAC,HCNC,, | Performed by: STUDENT IN AN ORGANIZED HEALTH CARE EDUCATION/TRAINING PROGRAM

## 2020-06-03 PROCEDURE — 87086 URINE CULTURE/COLONY COUNT: CPT | Mod: HCNC

## 2020-06-03 PROCEDURE — 81002 URINALYSIS NONAUTO W/O SCOPE: CPT | Mod: HCNC,S$GLB,, | Performed by: STUDENT IN AN ORGANIZED HEALTH CARE EDUCATION/TRAINING PROGRAM

## 2020-06-03 PROCEDURE — 87077 CULTURE AEROBIC IDENTIFY: CPT | Mod: HCNC

## 2020-06-03 RX ORDER — NITROFURANTOIN 25; 75 MG/1; MG/1
100 CAPSULE ORAL 2 TIMES DAILY
Qty: 14 CAPSULE | Refills: 0 | Status: SHIPPED | OUTPATIENT
Start: 2020-06-03 | End: 2020-06-10

## 2020-06-03 NOTE — PROGRESS NOTES
History & Physical  Gynecology      SUBJECTIVE:     Chief Complaint: Urinary Tract Infection (pt stated pain and bleeding a bit with urine and wiping )       History of Present Illness:  Marshall presents today for mild pink vaginal discharge/bleeding, some vaginal pain, bleeding with urination. Pt has been recently treated for UTI (cuilture negative) and seen in ED for suspected nephrolithiasis for which she followed up with urology (see summary of ntoe below). She was told they had a low suspicion for kidney stones and that pain was not  in origin. She continues to have dark urine, urinary frequency/urgency.  For vaginal pain and pink discharge, she was started on premarin cream, but it was discontinued under her medical coverage. She was then switched by Dr. hewitt to estrace and she just arted it on friday taking it twice per week. She used the applicator and has noted scrathing, burning discomfort since and sometimes brown or pink discharge with wiping.  She is not sexually active  Does not leak urine, but gets up three times per night to urinate. Drinks honey water before bed.  No fevers, chills, N/V. No appetite or weight changes.  Bowel movements once per day    uology note:  Marshall Almanza is a 70-year-old female who comes in to the Urology Clinic after being evaluated in the ER for left lower quadrant abdominal pain and bilateral hydronephrosis seen on CT scan.  She was initially seen by her PCP several days ago prescribed some medication for her abdominal pain and antibiotics for possible UTI (UA showed 1 rbc and 7 wbc, rare bacteria). When the medication did not relieve her pain she was told to go to the ER. She did not have fever or chills. In the ER she had a CT scan done which shows bilateral mild hydronephrosis, no calyceal dilation and no ureteral dilation and a partially distended bladder. There does not appear to be any nephrolithiasis.  She has no urologic history. Kidney function and WBC  "normal.  She does have a previous CT scan on file from  which shows a similar and hydronephrosis bilaterally, indicating this is a chronic finding.  She does struggle with occasional gas and bloating, and states that her current abdominal pain "comes and goes" and does not radiate.  She normally has 1 bowel movement a day and sometimes struggles with constipation.  She takes a probiotic.  She normally voids every 2-3 hours and feels like she completely empties her bladder. PVR in clinic today is 2cc.       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 No pertinent surgical history.  OB History        0    Para   0    Term   0       0    AB   0    Living   0       SAB   0    TAB   0    Ectopic   0    Multiple   0    Live Births   0               Family History   Problem Relation Age of Onset    Cataracts Mother     Cancer Father         leukemia    Amblyopia Neg Hx     Blindness Neg Hx     Diabetes Neg Hx     Glaucoma Neg Hx     Hypertension Neg Hx     Macular degeneration Neg Hx     Retinal detachment Neg Hx     Strabismus Neg Hx     Stroke Neg Hx     Thyroid disease Neg Hx     Breast cancer Neg Hx     Colon cancer Neg Hx     Ovarian cancer Neg Hx      Social History     Tobacco Use    Smoking status: Never Smoker    Smokeless tobacco: Never Used   Substance Use Topics    Alcohol use: No    Drug use: No       Current Outpatient Medications   Medication Sig    alendronate (FOSAMAX) 70 MG tablet TAKE 1 TAB (70 MG TOTAL) ORALLY EVERY 7 DAYS    esomeprazole (NEXIUM) 40 MG capsule TAKE ONE CAPSULE BY MOUTH EVERY DAY BEFORE BREAKFAST    multivitamin (THERAGRAN) per tablet Take 1 " tablet by mouth Daily.    estradioL (ESTRACE) 0.01 % (0.1 mg/gram) vaginal cream Place 0.5 g vaginally twice a week. (Patient not taking: Reported on 6/3/2020)    nitrofurantoin, macrocrystal-monohydrate, (MACROBID) 100 MG capsule Take 1 capsule (100 mg total) by mouth 2 (two) times daily. for 7 days    ondansetron (ZOFRAN-ODT) 4 MG TbDL Take 1 tablet (4 mg total) by mouth every 6 (six) hours as needed. (Patient not taking: Reported on 6/3/2020)     No current facility-administered medications for this visit.          Review of Systems:  Review of Systems   Constitutional: Negative for activity change, appetite change, chills, fever and unexpected weight change.   Gastrointestinal: Negative for abdominal pain, blood in stool, constipation, diarrhea, nausea and vomiting.   Genitourinary: Positive for dysuria, frequency, hematuria, urgency, vaginal bleeding, vaginal discharge and vaginal pain. Negative for bladder incontinence and urinary incontinence.        OBJECTIVE:     Physical Exam:  Physical Exam   Constitutional: She appears well-developed and well-nourished. No distress.   HENT:   Head: Normocephalic and atraumatic.   Eyes: Conjunctivae are normal.   Neck: Normal range of motion.   Cardiovascular: Normal rate.   Pulmonary/Chest: Effort normal.   Abdominal: Soft. She exhibits no distension and no mass. There is no tenderness. There is no rebound and no guarding.   Genitourinary: There is no rash, tenderness, lesion or injury on the right labia. There is no rash, tenderness, lesion or injury on the left labia. Uterus is not deviated, not enlarged, not fixed and not tender. Cervix exhibits no motion tenderness, no discharge and no friability. Right adnexum displays no mass, no tenderness and no fullness. Left adnexum displays no mass, no tenderness and no fullness. No erythema, tenderness or bleeding in the vagina. No foreign body in the vagina. No signs of injury around the vagina. No vaginal discharge  found.   Genitourinary Comments: Atrophy is apparent. Mild excoriation near urethra. Labia minor reabsorbed bialterally   Musculoskeletal: Normal range of motion.   Neurological: She is alert.   Skin: Skin is warm and dry. She is not diaphoretic.   Psychiatric: She has a normal mood and affect.   Vitals reviewed.        ASSESSMENT:       ICD-10-CM ICD-9-CM    1. Urinary tract infection with hematuria, site unspecified N39.0 599.0 POCT URINE DIPSTICK WITHOUT MICROSCOPE    R31.9 599.70 Vaginosis Screen by DNA Probe      Urine culture   2. Vaginal atrophy N95.2 627.3 Vaginosis Screen by DNA Probe      Urine culture          Plan:      Urine is very dark in color. Lots of blood on dip today. +1 leukocytes. Will treat again for UTI and send for culture.  If urinary frequency does nto improve, consider ditropan. Pt told to cease drinking anything after supper and to void every 2-3 hours.  Has not been on estrace cream for some time as she had insurance coverage issues. Has pain with inserting the applicator. Recommended she start with daily application for the first two weeks then to twice weekly maintenance there after. Recommended she use a finger for application instead of plastic applicator. Discussed with her that regular estrogen use should also help decrease urinary tract infection recurrences.   Urogyn referral sent as there is gross hematuria present and urology has signed off.    Counseling time: 30 minutes    Chayo Roberts

## 2020-06-04 LAB
BACTERIAL VAGINOSIS DNA: NEGATIVE
CANDIDA GLABRATA DNA: NEGATIVE
CANDIDA KRUSEI DNA: NEGATIVE
CANDIDA RRNA VAG QL PROBE: NEGATIVE
T VAGINALIS RRNA GENITAL QL PROBE: NEGATIVE

## 2020-06-05 LAB — BACTERIA UR CULT: ABNORMAL

## 2020-06-09 ENCOUNTER — PATIENT OUTREACH (OUTPATIENT)
Dept: ADMINISTRATIVE | Facility: OTHER | Age: 70
End: 2020-06-09

## 2020-06-10 ENCOUNTER — INITIAL CONSULT (OUTPATIENT)
Dept: UROGYNECOLOGY | Facility: CLINIC | Age: 70
End: 2020-06-10
Payer: MEDICARE

## 2020-06-10 VITALS
WEIGHT: 97.69 LBS | SYSTOLIC BLOOD PRESSURE: 102 MMHG | HEIGHT: 64 IN | BODY MASS INDEX: 16.68 KG/M2 | DIASTOLIC BLOOD PRESSURE: 76 MMHG

## 2020-06-10 DIAGNOSIS — R31.9 URINARY TRACT INFECTION WITH HEMATURIA, SITE UNSPECIFIED: ICD-10-CM

## 2020-06-10 DIAGNOSIS — N39.0 URINARY TRACT INFECTION WITH HEMATURIA, SITE UNSPECIFIED: ICD-10-CM

## 2020-06-10 LAB
BACTERIA #/AREA URNS AUTO: NORMAL /HPF
MICROSCOPIC COMMENT: NORMAL
RBC #/AREA URNS AUTO: 1 /HPF (ref 0–4)
SQUAMOUS #/AREA URNS AUTO: 0 /HPF
WBC #/AREA URNS AUTO: 1 /HPF (ref 0–5)

## 2020-06-10 PROCEDURE — 1126F AMNT PAIN NOTED NONE PRSNT: CPT | Mod: HCNC,S$GLB,, | Performed by: PHYSICIAN ASSISTANT

## 2020-06-10 PROCEDURE — 99999 PR PBB SHADOW E&M-EST. PATIENT-LVL IV: CPT | Mod: PBBFAC,HCNC,, | Performed by: PHYSICIAN ASSISTANT

## 2020-06-10 PROCEDURE — 1159F PR MEDICATION LIST DOCUMENTED IN MEDICAL RECORD: ICD-10-PCS | Mod: HCNC,S$GLB,, | Performed by: PHYSICIAN ASSISTANT

## 2020-06-10 PROCEDURE — 99214 OFFICE O/P EST MOD 30 MIN: CPT | Mod: HCNC,S$GLB,, | Performed by: PHYSICIAN ASSISTANT

## 2020-06-10 PROCEDURE — 1101F PT FALLS ASSESS-DOCD LE1/YR: CPT | Mod: HCNC,CPTII,S$GLB, | Performed by: PHYSICIAN ASSISTANT

## 2020-06-10 PROCEDURE — 81001 URINALYSIS AUTO W/SCOPE: CPT | Mod: HCNC

## 2020-06-10 PROCEDURE — 1101F PR PT FALLS ASSESS DOC 0-1 FALLS W/OUT INJ PAST YR: ICD-10-PCS | Mod: HCNC,CPTII,S$GLB, | Performed by: PHYSICIAN ASSISTANT

## 2020-06-10 PROCEDURE — 1159F MED LIST DOCD IN RCRD: CPT | Mod: HCNC,S$GLB,, | Performed by: PHYSICIAN ASSISTANT

## 2020-06-10 PROCEDURE — 99999 PR PBB SHADOW E&M-EST. PATIENT-LVL IV: ICD-10-PCS | Mod: PBBFAC,HCNC,, | Performed by: PHYSICIAN ASSISTANT

## 2020-06-10 PROCEDURE — 1126F PR PAIN SEVERITY QUANTIFIED, NO PAIN PRESENT: ICD-10-PCS | Mod: HCNC,S$GLB,, | Performed by: PHYSICIAN ASSISTANT

## 2020-06-10 PROCEDURE — 87086 URINE CULTURE/COLONY COUNT: CPT | Mod: HCNC

## 2020-06-10 PROCEDURE — 99214 PR OFFICE/OUTPT VISIT, EST, LEVL IV, 30-39 MIN: ICD-10-PCS | Mod: HCNC,S$GLB,, | Performed by: PHYSICIAN ASSISTANT

## 2020-06-10 NOTE — PATIENT INSTRUCTIONS
1)  Frequent UTIs (bladder infections):  --Recurrent cystitis refers to >2 infections in six months or >3 infections in one year. Patient does not meet requirement for diagnosis of recurrent UTI.   --POCT UA negative, however patient still on Macrobid  --urine C&S sent today  --If you feel like you have a UTI, please call our office so that we can place an order for you to drop off a urine specimen at the closest [facility] lab (we will arrange).     --We will call in antibiotics for you to start right after you drop off specimen.     --In this way, we can determine:     1)  Do you have a UTI?      2) If you have a UTI, is it sensitive to the antibiotics we prescribed?   --follow UTI prevention tips (see attached)  --control bowel movements/fecal cross-contamination  --treat vaginal atrophy (dryness)  --empty bladder regularly. Sit on toilet after urination, wait for a minute, then lean forward to make sure bladder is empty.   --start taking 1 cranberry tablet daily  --start taking 1 probiotic pill (any with lactobacillus and/or acidophilus) daily  --consider need for further evaluation (pelvic imaging and cystoscopy) if issue persists    2) Vaginal atrophy (dryness):    -- Restart 1 gram of estrogen cream in vagina twice a week at night.        3) Constipation/Bowel discomfort:  Controlling constipation may help bladder urgency/leakage and fiber may better control cholesterol and blood glucose.  Start daily fiber.  Take 1 tsp of fiber powder (psyllium or other sugar-free powder).  Mix in 8 oz of water.  Take x 3-5 days.  Then, increase fiber by 1 tsp every 3-5 days until stool is easy to pass.  Stop and continue at that dose.   Do not exceed 6 tsps/day.  May also use over the counter stool softener 1-2 x/day.  AVOID laxatives.    RTC in 1 month

## 2020-06-10 NOTE — PROGRESS NOTES
[facility] UROGYNECOLOGY-[id number]   [address] [address]    Marshall Almanza  [id number]  1950  Leann 10, 2020    Consulting Physician: iTrso Castellon MD   GYN: david Swann  PCP: Michelle Bravo MD    CC:   Chief Complaint   Patient presents with    recurrent uti    bladder pressure       HPI: 71 y/o G0 with GERD, osteoporosis, presents  for recurrent UTI    Saw Dr. Roberts 1 week ago today c/o sharp pain with urination and urinary frequency, found to have positive culture for E. Coli, started on Macrobid. Takes last dose of Macrobid tomorrow, and dysuria better, but still feels bladder pressure and urgency that she has to go to the bathroom. Had been rx'ed vaginal estrogen by Dr. Swann in 2018, but patient stopped after 2 months when her vaginal discharge resolved, then re-started a couple months later after vaginal discharge came back. After being back on vaginal estrogen for a couple of days, she started with symptoms of a UTI and stopped it because she was worried it worsened UTI.  Her first UTI ever was in 2014, she has had 2 positive cultures since 2018. Her symptoms with UTI are urinary frequency and dysuria as well as bladder pressure. Today she does not have dysuria, but is c/o urinary frequency and bladder pressure.     1)  UI:  (--) DELANEY  (--) UUI   (--) pads:0/day, Daytime frequency: Q 2 hours.  Nocturia: Yes: 2/night.   (--) dysuria,  (--) hematuria,  (+) frequent UTIs.  (+) incomplete bladder emptying.    2)  POP:  Absent.  (+) vaginal bleeding. (--) vaginal discharge. (--) sexually active.  (--) dyspareunia.  (--)  Vaginal dryness.  (+) vaginal estrogen use (not currently taking)    3)  BM:  (+) constipation/straining.  (--) chronic diarrhea. (--) hematochezia.  (--) fecal incontinence.  (--) fecal smearing/urgency.  (--) incomplete evacuation.     Past Medical History  Past Medical History:   Diagnosis Date    Arthritis     Multiple thyroid  nodules     Osteoporosis     Reflux     Senile nuclear sclerosis - Both Eyes 10/1/2013        Past Surgical History  History reviewed. No pertinent surgical history.   Hysterectomy: No   Cervix present: Yes  Ovaries present: Yes     Family History  Family History   Problem Relation Age of Onset    Cataracts Mother     Cancer Father         leukemia    Amblyopia Neg Hx     Blindness Neg Hx     Diabetes Neg Hx     Glaucoma Neg Hx     Hypertension Neg Hx     Macular degeneration Neg Hx     Retinal detachment Neg Hx     Strabismus Neg Hx     Stroke Neg Hx     Thyroid disease Neg Hx     Breast cancer Neg Hx     Colon cancer Neg Hx     Ovarian cancer Neg Hx       Social History  Social History     Tobacco Use   Smoking Status Never Smoker   Smokeless Tobacco Never Used     Social History     Substance and Sexual Activity   Alcohol Use No   .    Social History     Substance and Sexual Activity   Drug Use No   .  The patient is single.  Resides in [place] [address].  Employment status: retired      Allergies  Review of patient's allergies indicates:   Allergen Reactions    Ciprofloxacin      palpitations    Flagyl [metronidazole hcl]      Cause urinary frequency       Medications  Current Outpatient Medications on File Prior to Visit   Medication Sig Dispense Refill    alendronate (FOSAMAX) 70 MG tablet TAKE 1 TAB (70 MG TOTAL) ORALLY EVERY 7 DAYS 4 tablet 12    dicyclomine (BENTYL) 10 MG capsule TAKE 1 CAPSULE (10 MG TOTAL) BY MOUTH 4 (FOUR) TIMES DAILY BEFORE MEALS AND NIGHTLY. 120 capsule 0    esomeprazole (NEXIUM) 40 MG capsule TAKE ONE CAPSULE BY MOUTH EVERY DAY BEFORE BREAKFAST 30 capsule 11    estradioL (ESTRACE) 0.01 % (0.1 mg/gram) vaginal cream Place 0.5 g vaginally twice a week. 42.5 g 1    multivitamin (THERAGRAN) per tablet Take 1 tablet by mouth Daily.      nitrofurantoin, macrocrystal-monohydrate, (MACROBID) 100 MG capsule Take 1 capsule (100 mg total) by  "mouth 2 (two) times daily. for 7 days 14 capsule 0    ondansetron (ZOFRAN-ODT) 4 MG TbDL Take 1 tablet (4 mg total) by mouth every 6 (six) hours as needed. 20 tablet 0     No current facility-administered medications on file prior to visit.        OB History    Para Term  AB Living   0 0 0 0 0 0   SAB TAB Ectopic Multiple Live Births   0 0 0 0 0        Past OB History       Gynecologic History  LMP: Patient's last menstrual period was 1998.    Well Woman  Last pap:  -- NSIL  Last mammogram: 2019 -- negative   Colonoscopy: unsure  DEXA: Osteoporosis of total hip and femoral neck.  Decrease in hip BMD (-7.3%) since prior study. FRAX calculations do not suggest treatment.   Recommendations:  1) Adequate calcium and Vitamin D therapy  2) Appropriate exercise  3) Confirm compliance with dosing and administration of bisphosphonate.  Consider alternative therapy.  4) Consider repeat BMD in 2 years ()    Review of Systems A 14 point ROS was reviewed with pertinent positives as noted above in the history of present illness.      Constitutional: negative  Eyes: negative  Endocrine: negative  Gastrointestinal: negative  Cardiovascular: negative  Respiratory: negative  Allergic/Immunologic: negative  Integumentary: negative  Psychiatric: negative  Musculoskeletal: negative   Ear/Nose/Throat: negative  Neurologic: negative  Genitourinary: SEE HPI  Hematologic/Lymphatic: negative   Breast: negative    Urogynecologic Exam  /76 (BP Location: Left arm, Patient Position: Sitting)   Ht 5' 4" (1.626 m)   Wt 44.3 kg (97 lb 10.6 oz)   LMP 1998   BMI 16.76 kg/m²     GENERAL APPEARANCE:  The patient is a well-developed, well-nourished female.  Neck:  Supple with no thyromegaly, no carotid bruits.  Heart:  Regular rate and rhythm, no murmurs, rubs or gallops.  Lungs:  Clear.  No CVA tenderness.  Abdomen:  Soft, nontender, nondistended, no hepatosplenomegaly.    PELVIC:    External " genitalia:  Normal Bartholins, Skenes and labia bilaterally.    Urethra:  No caruncle, diverticulum or masses.  (--) hypermobility.    Vagina:  Atrophy (+) , no bladder masses or tender, no discharge.  Small introitus, able to tolerate speculum exam.   Cervix:  normal appearance  Uterus: normal size, contour, position, consistency, mobility, non-tender  Adnexa: Not palpable.    POP-Q: Deferred.  No obvious POP present with valsalva.     NEUROLOGIC:  Cranial nerves 2 through 12 intact.  Strength 5/5.  DTRs 2+ lower extremities.  S2 through 4 normal.  Sacral reflexes intact.    EXT: PATEL, 2+ pulses bilaterally, no C/C/E    COUGH STRESS TEST:  negative  KEGEL: 3 /5    RECTAL:    External:  Normal, (--) hemorrhoids, (--) dovetailing.   Internal:  deferred    PVR: 40 mL      Impression  1. Urinary tract infection with hematuria, site unspecified        Initial Plan  The patient was counseled regarding these issues. She was given a summary sheet containing each of these issues with possible options for evaluation and management. We also reviewed computer-generated diagrams specific to her pelvic organ prolapse quantification findings and she was given a copy of this for her records.  All her questions were addressed to her satisfaction.     1)  Frequent UTIs (bladder infections):  --Recurrent cystitis refers to >2 infections in six months or >3 infections in one year. Patient does not meet requirement for diagnosis of recurrent UTI.   --POCT UA negative, however patient still on Macrobid  --urine C&S sent today  --PVR WNL  --If you feel like you have a UTI, please call our office so that we can place an order for you to drop off a urine specimen at the closest [facility] lab (we will arrange).     --We will call in antibiotics for you to start right after you drop off specimen.     --In this way, we can determine:     1)  Do you have a UTI?      2) If you have a UTI, is it sensitive to the antibiotics we prescribed?    --follow UTI prevention tips (see attached)  --control bowel movements/fecal cross-contamination  --treat vaginal atrophy (dryness)  --empty bladder regularly. Sit on toilet after urination, wait for a minute, then lean forward to make sure bladder is empty.   --start taking 1 cranberry tablet daily  --start taking 1 probiotic pill (any with lactobacillus and/or acidophilus) daily  --consider need for further evaluation (pelvic imaging and cystoscopy) if issue persists    2) Vaginal atrophy (dryness):    -- Restart 1 gram of estrogen cream in vagina twice a week at night.        3) Constipation/Bowel discomfort:  Controlling constipation may help bladder urgency/leakage and fiber may better control cholesterol and blood glucose.  Start daily fiber.  Take 1 tsp of fiber powder (psyllium or other sugar-free powder).  Mix in 8 oz of water.  Take x 3-5 days.  Then, increase fiber by 1 tsp every 3-5 days until stool is easy to pass.  Stop and continue at that dose.   Do not exceed 6 tsps/day.  May also use over the counter stool softener 1-2 x/day.  AVOID laxatives.    4) Well Woman  -- Need to schedule DXA scan, colonoscopy, and mammogram at next visit    RTC in 1 month    Approximately 45 min were spent in consult, 90 % in discussion.     Thank you for requesting consultation of your patient.  I look forward to participating in her care.    Leighton Weaver PA-C  Division of Female Pelvic Medicine and Reconstructive Surgery  Department of Obstetrics and Gynecology  [facility] [place], [place]

## 2020-06-10 NOTE — LETTER
June 19, 2020      Tirso Castellon MD  [address]  [facility] [address]           [facility] UroGynecology-[id number]   [address] [place] [address]  Phone: [phone]          Patient: Marshall Almanza   MR Number: [id number]   YOB: 1950   Date of Visit: 6/10/2020       Dear Dr. Tirso Castellon:    Thank you for referring Marshall Almanza to me for evaluation. Attached you will find relevant portions of my assessment and plan of care.    If you have questions, please do not hesitate to call me. I look forward to following Marshall Almanza along with you.    Sincerely,    JOANNA Mendezosure  CC:  No Recipients    If you would like to receive this communication electronically, please contact [email or web address] or [phone] to request more information on [employer] Link access.    For providers and/or their staff who would like to refer a patient to [facility], please contact us through our one-stop-shop provider referral line, [facility], at [phone].    If you feel you have received this communication in error or would no longer like to receive these types of communications, please e-mail [email or web address]

## 2020-06-12 LAB — BACTERIA UR CULT: NO GROWTH

## 2020-07-14 ENCOUNTER — PATIENT OUTREACH (OUTPATIENT)
Dept: ADMINISTRATIVE | Facility: OTHER | Age: 70
End: 2020-07-14

## 2020-07-15 ENCOUNTER — OFFICE VISIT (OUTPATIENT)
Dept: UROGYNECOLOGY | Facility: CLINIC | Age: 70
End: 2020-07-15
Payer: MEDICARE

## 2020-07-15 VITALS
WEIGHT: 94.88 LBS | HEIGHT: 64 IN | BODY MASS INDEX: 16.2 KG/M2 | DIASTOLIC BLOOD PRESSURE: 78 MMHG | SYSTOLIC BLOOD PRESSURE: 110 MMHG

## 2020-07-15 DIAGNOSIS — N39.0 URINARY TRACT INFECTION WITH HEMATURIA, SITE UNSPECIFIED: ICD-10-CM

## 2020-07-15 DIAGNOSIS — N95.2 VAGINAL ATROPHY: ICD-10-CM

## 2020-07-15 DIAGNOSIS — Z12.31 ENCOUNTER FOR SCREENING MAMMOGRAM FOR MALIGNANT NEOPLASM OF BREAST: ICD-10-CM

## 2020-07-15 DIAGNOSIS — R31.9 URINARY TRACT INFECTION WITH HEMATURIA, SITE UNSPECIFIED: ICD-10-CM

## 2020-07-15 DIAGNOSIS — Z01.419 WELL WOMAN EXAM: Primary | ICD-10-CM

## 2020-07-15 DIAGNOSIS — N95.8 OTHER SPECIFIED MENOPAUSAL AND PERIMENOPAUSAL DISORDERS: ICD-10-CM

## 2020-07-15 LAB
BACTERIA #/AREA URNS AUTO: ABNORMAL /HPF
BILIRUB UR QL STRIP: NEGATIVE
GLUCOSE UR QL STRIP: NEGATIVE
KETONES UR QL STRIP: NEGATIVE
LEUKOCYTE ESTERASE UR QL STRIP: POSITIVE
MICROSCOPIC COMMENT: ABNORMAL
PH, POC UA: 6
POC BLOOD, URINE: NEGATIVE
POC NITRATES, URINE: NEGATIVE
PROT UR QL STRIP: NEGATIVE
RBC #/AREA URNS AUTO: 1 /HPF (ref 0–4)
SP GR UR STRIP: 1.01 (ref 1–1.03)
SQUAMOUS #/AREA URNS AUTO: 4 /HPF
UROBILINOGEN UR STRIP-ACNC: 0.1 (ref 0.1–1.1)
WBC #/AREA URNS AUTO: 19 /HPF (ref 0–5)

## 2020-07-15 PROCEDURE — 99499 UNLISTED E&M SERVICE: CPT | Mod: HCNC,S$GLB,, | Performed by: PHYSICIAN ASSISTANT

## 2020-07-15 PROCEDURE — 1159F MED LIST DOCD IN RCRD: CPT | Mod: HCNC,S$GLB,, | Performed by: PHYSICIAN ASSISTANT

## 2020-07-15 PROCEDURE — 1101F PR PT FALLS ASSESS DOC 0-1 FALLS W/OUT INJ PAST YR: ICD-10-PCS | Mod: HCNC,CPTII,S$GLB, | Performed by: PHYSICIAN ASSISTANT

## 2020-07-15 PROCEDURE — 99999 PR PBB SHADOW E&M-EST. PATIENT-LVL III: ICD-10-PCS | Mod: PBBFAC,HCNC,, | Performed by: PHYSICIAN ASSISTANT

## 2020-07-15 PROCEDURE — 1159F PR MEDICATION LIST DOCUMENTED IN MEDICAL RECORD: ICD-10-PCS | Mod: HCNC,S$GLB,, | Performed by: PHYSICIAN ASSISTANT

## 2020-07-15 PROCEDURE — 99999 PR PBB SHADOW E&M-EST. PATIENT-LVL III: CPT | Mod: PBBFAC,HCNC,, | Performed by: PHYSICIAN ASSISTANT

## 2020-07-15 PROCEDURE — G0101 PR CA SCREEN;PELVIC/BREAST EXAM: ICD-10-PCS | Mod: HCNC,S$GLB,, | Performed by: PHYSICIAN ASSISTANT

## 2020-07-15 PROCEDURE — G0101 CA SCREEN;PELVIC/BREAST EXAM: HCPCS | Mod: HCNC,S$GLB,, | Performed by: PHYSICIAN ASSISTANT

## 2020-07-15 PROCEDURE — 3008F PR BODY MASS INDEX (BMI) DOCUMENTED: ICD-10-PCS | Mod: HCNC,CPTII,S$GLB, | Performed by: PHYSICIAN ASSISTANT

## 2020-07-15 PROCEDURE — 81001 URINALYSIS AUTO W/SCOPE: CPT | Mod: HCNC

## 2020-07-15 PROCEDURE — 1101F PT FALLS ASSESS-DOCD LE1/YR: CPT | Mod: HCNC,CPTII,S$GLB, | Performed by: PHYSICIAN ASSISTANT

## 2020-07-15 PROCEDURE — 3008F BODY MASS INDEX DOCD: CPT | Mod: HCNC,CPTII,S$GLB, | Performed by: PHYSICIAN ASSISTANT

## 2020-07-15 PROCEDURE — 99499 RISK ADDL DX/OHS AUDIT: ICD-10-PCS | Mod: HCNC,S$GLB,, | Performed by: PHYSICIAN ASSISTANT

## 2020-07-15 NOTE — PROGRESS NOTES
[facility] UROGYNECOLOGY-[id number]   [address] [address]  July 15, 2020     Marshall Almanza  [id number]  1950    UROGYN FOLLOW-UP  7/15/2020     70 y.o. female,  with GERD, osteoporosis, presents for urogyn follow up. She c/o   Chief Complaint   Patient presents with    Follow-up      Last HPI from 06/10/2020:   Saw Dr. Roberts 1 week ago today c/o sharp pain with urination and urinary frequency, found to have positive culture for E. Coli, started on Macrobid. Takes last dose of Macrobid tomorrow, and dysuria better, but still feels bladder pressure and urgency that she has to go to the bathroom. Had been rx'ed vaginal estrogen by Dr. Swann in 2018, but patient stopped after 2 months when her vaginal discharge resolved, then re-started a couple months later after vaginal discharge came back. After being back on vaginal estrogen for a couple of days, she started with symptoms of a UTI and stopped it because she was worried it worsened UTI.  Her first UTI ever was in , she has had 2 positive cultures since 2018. Her symptoms with UTI are urinary frequency and dysuria as well as bladder pressure. Today she does not have dysuria, but is c/o urinary frequency and bladder pressure.     1)  UI:  (--) DELANEY  (--) UUI   (--) pads:0/day, Daytime frequency: Q 2 hours.  Nocturia: Yes: 2/night.   (--) dysuria,  (--) hematuria,  (+) frequent UTIs.  (+) incomplete bladder emptying.  2)  POP:  Absent.  (+) vaginal bleeding. (--) vaginal discharge. (--) sexually active.  (--) dyspareunia.  (--)  Vaginal dryness.  (+) vaginal estrogen use (not currently taking)  3)  BM:  (+) constipation/straining.  (--) chronic diarrhea. (--) hematochezia.  (--) fecal incontinence.  (--) fecal smearing/urgency.  (--) incomplete evacuation.     Changes from last visit:  She is doing well. Has not seen any gross hematuria in the last month. Is not currently having symptoms of UTI. Has been using vaginal  estrogen once weekly, she is still worried it will cause UTI since she got UTI immediately after first dose a few years ago. Is taking two probiotics, one for IBS and other for recurrent UTI, both have lactobacillus acidophilus. She is not doing fiber supplement, but she is trying to regulate bowel with better diet. Denies frequent constipation. She is worried about scheduling mammo, scope, and bone scan due to COVID, she is nervous about sitting in a waiting room    Past Medical History:   Diagnosis Date    Arthritis     Multiple thyroid nodules     Osteoporosis     Reflux     Senile nuclear sclerosis - Both Eyes 10/1/2013       History reviewed. No pertinent surgical history.    Family History   Problem Relation Age of Onset    Cataracts Mother     Cancer Father         leukemia    Amblyopia Neg Hx     Blindness Neg Hx     Diabetes Neg Hx     Glaucoma Neg Hx     Hypertension Neg Hx     Macular degeneration Neg Hx     Retinal detachment Neg Hx     Strabismus Neg Hx     Stroke Neg Hx     Thyroid disease Neg Hx     Breast cancer Neg Hx     Colon cancer Neg Hx     Ovarian cancer Neg Hx        Social History     Socioeconomic History    Marital status: Single     Spouse name: Not on file    Number of children: 0    Years of education: Not on file    Highest education level: Not on file   Occupational History    Occupation:      Employer: [employer]   Social Needs    Financial resource strain: Not on file    Food insecurity     Worry: Not on file     Inability: Not on file    Transportation needs     Medical: Not on file     Non-medical: Not on file   Tobacco Use    Smoking status: Never Smoker    Smokeless tobacco: Never Used   Substance and Sexual Activity    Alcohol use: No    Drug use: No    Sexual activity: Not Currently   Lifestyle    Physical activity     Days per week: Not on file     Minutes per session: Not on file    Stress: Not on file  "  Relationships    Social connections     Talks on phone: Not on file     Gets together: Not on file     Attends [employer]ism service: Not on file     Active member of club or organization: Not on file     Attends meetings of clubs or organizations: Not on file     Relationship status: Not on file   Other Topics Concern    Not on file   Social History Narrative    Not on file       Current Outpatient Medications   Medication Sig Dispense Refill    alendronate (FOSAMAX) 70 MG tablet TAKE 1 TAB (70 MG TOTAL) ORALLY EVERY 7 DAYS 4 tablet 12    esomeprazole (NEXIUM) 40 MG capsule TAKE ONE CAPSULE BY MOUTH EVERY DAY BEFORE BREAKFAST 30 capsule 11    estradioL (ESTRACE) 0.01 % (0.1 mg/gram) vaginal cream Place 0.5 g vaginally twice a week. 42.5 g 1    multivitamin (THERAGRAN) per tablet Take 1 tablet by mouth Daily.      ondansetron (ZOFRAN-ODT) 4 MG TbDL Take 1 tablet (4 mg total) by mouth every 6 (six) hours as needed. 20 tablet 0     No current facility-administered medications for this visit.        Review of patient's allergies indicates:   Allergen Reactions    Ciprofloxacin      palpitations    Flagyl [metronidazole hcl]      Cause urinary frequency       OB History        0    Para   0    Term   0       0    AB   0    Living   0       SAB   0    TAB   0    Ectopic   0    Multiple   0    Live Births   0                  Well Woman  Patient's last menstrual period was 1998.   Last pap:  -- NSIL  Last mammogram: 2019 -- negative   Colonoscopy: unsure  DEXA: Osteoporosis of total hip and femoral neck.  Decrease in hip BMD (-7.3%) since prior study. FRAX calculations do not suggest treatment.   Recommendations:  1) Adequate calcium and Vitamin D therapy  2) Appropriate exercise  3) Confirm compliance with dosing and administration of bisphosphonate.  Consider alternative therapy.  4) Consider repeat BMD in 2 years ()    ROS  As per HPI.      Physical Exam  /78   Ht 5' 4" " (1.626 m)   Wt 43 kg (94 lb 14.4 oz)   LMP 08/29/1998   BMI 16.29 kg/m²   General: alert and oriented, no acute distress  Respiratory: normal respiratory effort  Abd: soft, non-tender, non-distended  Pelvic:  Deferred today    Impression  1. Well woman exam  DXA Bone Density Spine And Hip    Case request GI: COLONOSCOPY    Mammo Digital Screening Bilat w/ Edgar   2. Other specified menopausal and perimenopausal disorders   DXA Bone Density Spine And Hip   3. Encounter for screening mammogram for malignant neoplasm of breast   Mammo Digital Screening Bilat w/ Edgar     We reviewed the above issues and discussed options for short-term versus long-term management of her problems.     Plan:   1)  Frequent UTIs (bladder infections):  --Recurrent cystitis refers to >2 infections in six months or >3 infections in one year. Patient does not meet requirement for diagnosis of recurrent UTI.   --POCT UA + for leukocytes, no symptoms, will send for UA microscopy  --If you feel like you have a UTI, please call our office so that we can place an order for you to drop off a urine specimen at the closest [facility] lab (we will arrange).                --We will call in antibiotics for you to start right after you drop off specimen.                --In this way, we can determine:                           1)  Do you have a UTI?                            2) If you have a UTI, is it sensitive to the antibiotics we prescribed?   --follow UTI prevention tips (see attached)  --control bowel movements/fecal cross-contamination  --treat vaginal atrophy (dryness) -- see below  --Empty bladder regularly. Sit on toilet after urination, wait for a minute, then lean forward to make sure bladder is empty.   --Continue taking probiotic pill (any with lactobacillus and/or acidophilus) daily. She is taking Physician's Choice Women's probiotic with D-mannose and cranberry extract ($30 for 30 pills from [employer])  --consider need for further  evaluation (pelvic imaging and cystoscopy) if issue persists     2) Vaginal atrophy (dryness):    -- Continue 0.5 gram of estrogen cream in vagina twice a week at night.         3) Constipation/Bowel discomfort:  --Controlling constipation may help bladder urgency/leakage and fiber may better control cholesterol and blood glucose.  Start daily fiber supplement in addition to healthy foods like fruits and vegetables to help with constipation/straining.  Take 1 tsp of fiber powder (psyllium or other sugar-free powder).  Mix in 8 oz of water.  Take x 3-5 days.  Then, increase fiber by 1 tsp every 3-5 days until stool is easy to pass.  Stop and continue at that dose.   Do not exceed 6 tsps/day.   -- May also use over the counter stool softener (Colace) 1-2 x/day if become constipated.  AVOID laxatives.     4) Well Woman  -- Need to schedule DXA scan, colonoscopy, and mammogram  --She wants to wait until after COVID to schedule imaging and scope. Recommend that she gets scheduled for August or September.     RTC in 3-4 months    30 minutes were spent in face to face time with this patient  100 % of this time was spent in counseling and/or coordination of care    Leighton Weaver PA-C  Division of Female Pelvic Medicine and Reconstructive Surgery  Department of Obstetrics & Gynecology  [facility] [place], [place]

## 2020-07-15 NOTE — PATIENT INSTRUCTIONS
1)  Frequent UTIs (bladder infections):  --Recurrent cystitis refers to >2 infections in six months or >3 infections in one year. Patient does not meet requirement for diagnosis of recurrent UTI.   --POCT UA + for leukocytes  --If you feel like you have a UTI, please call our office so that we can place an order for you to drop off a urine specimen at the closest [facility] lab (we will arrange).                --We will call in antibiotics for you to start right after you drop off specimen.                --In this way, we can determine:                           1)  Do you have a UTI?                            2) If you have a UTI, is it sensitive to the antibiotics we prescribed?   --follow UTI prevention tips (see attached)  --control bowel movements/fecal cross-contamination  --treat vaginal atrophy (dryness) -- see below  --Empty bladder regularly. Sit on toilet after urination, wait for a minute, then lean forward to make sure bladder is empty.   --Continue taking probiotic pill (any with lactobacillus and/or acidophilus) daily. She is taking Physician[employer] Choice Women's probiotic with D-mannose and cranberry extract ($30 for 30 pills from [employer])  --consider need for further evaluation (pelvic imaging and cystoscopy) if issue persists     2) Vaginal atrophy (dryness):    -- Continue 0.5 gram of estrogen cream in vagina twice a week at night.         3) Constipation/Bowel discomfort:  Controlling constipation may help bladder urgency/leakage and fiber may better control cholesterol and blood glucose.  Start daily fiber.  Take 1 tsp of fiber powder (psyllium or other sugar-free powder).  Mix in 8 oz of water.  Take x 3-5 days.  Then, increase fiber by 1 tsp every 3-5 days until stool is easy to pass.  Stop and continue at that dose.   Do not exceed 6 tsps/day.  May also use over the counter stool softener (Colace) 1-2 x/day if become constipated.  AVOID laxatives.     4) Well Woman  -- Need to schedule DXA  scan, colonoscopy, and mammogram  --She wants to wait until after COVID to schedule imaging and scope. Recommends that she gets scheduled for August or September.     RTC in 4 months

## 2020-07-22 ENCOUNTER — OFFICE VISIT (OUTPATIENT)
Dept: GASTROENTEROLOGY | Facility: CLINIC | Age: 70
End: 2020-07-22
Payer: MEDICARE

## 2020-07-22 ENCOUNTER — TELEPHONE (OUTPATIENT)
Dept: ENDOSCOPY | Facility: HOSPITAL | Age: 70
End: 2020-07-22

## 2020-07-22 ENCOUNTER — LAB VISIT (OUTPATIENT)
Dept: LAB | Facility: HOSPITAL | Age: 70
End: 2020-07-22
Attending: INTERNAL MEDICINE
Payer: MEDICARE

## 2020-07-22 VITALS
WEIGHT: 96.31 LBS | HEART RATE: 97 BPM | SYSTOLIC BLOOD PRESSURE: 124 MMHG | DIASTOLIC BLOOD PRESSURE: 71 MMHG | BODY MASS INDEX: 16.44 KG/M2 | HEIGHT: 64 IN

## 2020-07-22 DIAGNOSIS — N13.30 HYDRONEPHROSIS, UNSPECIFIED HYDRONEPHROSIS TYPE: ICD-10-CM

## 2020-07-22 DIAGNOSIS — R10.32 LEFT LOWER QUADRANT ABDOMINAL PAIN: ICD-10-CM

## 2020-07-22 DIAGNOSIS — R93.5 ABNORMAL CT OF THE ABDOMEN: ICD-10-CM

## 2020-07-22 DIAGNOSIS — Z12.11 SPECIAL SCREENING FOR MALIGNANT NEOPLASMS, COLON: Primary | ICD-10-CM

## 2020-07-22 DIAGNOSIS — Z01.818 PRE-OP TESTING: Primary | ICD-10-CM

## 2020-07-22 DIAGNOSIS — R19.4 CHANGE IN BOWEL HABITS: Primary | ICD-10-CM

## 2020-07-22 DIAGNOSIS — K59.09 CONSTIPATION, CHRONIC: ICD-10-CM

## 2020-07-22 DIAGNOSIS — R19.4 CHANGE IN BOWEL HABITS: ICD-10-CM

## 2020-07-22 LAB
IGA SERPL-MCNC: 161 MG/DL (ref 40–350)
TSH SERPL DL<=0.005 MIU/L-ACNC: 1.67 UIU/ML (ref 0.4–4)

## 2020-07-22 PROCEDURE — 99999 PR PBB SHADOW E&M-EST. PATIENT-LVL V: CPT | Mod: PBBFAC,HCNC,, | Performed by: INTERNAL MEDICINE

## 2020-07-22 PROCEDURE — 82784 ASSAY IGA/IGD/IGG/IGM EACH: CPT | Mod: HCNC

## 2020-07-22 PROCEDURE — 1159F MED LIST DOCD IN RCRD: CPT | Mod: HCNC,S$GLB,, | Performed by: INTERNAL MEDICINE

## 2020-07-22 PROCEDURE — 99999 PR PBB SHADOW E&M-EST. PATIENT-LVL V: ICD-10-PCS | Mod: PBBFAC,HCNC,, | Performed by: INTERNAL MEDICINE

## 2020-07-22 PROCEDURE — 1101F PR PT FALLS ASSESS DOC 0-1 FALLS W/OUT INJ PAST YR: ICD-10-PCS | Mod: HCNC,CPTII,S$GLB, | Performed by: INTERNAL MEDICINE

## 2020-07-22 PROCEDURE — 3008F PR BODY MASS INDEX (BMI) DOCUMENTED: ICD-10-PCS | Mod: HCNC,CPTII,S$GLB, | Performed by: INTERNAL MEDICINE

## 2020-07-22 PROCEDURE — 1159F PR MEDICATION LIST DOCUMENTED IN MEDICAL RECORD: ICD-10-PCS | Mod: HCNC,S$GLB,, | Performed by: INTERNAL MEDICINE

## 2020-07-22 PROCEDURE — 99204 PR OFFICE/OUTPT VISIT, NEW, LEVL IV, 45-59 MIN: ICD-10-PCS | Mod: HCNC,S$GLB,, | Performed by: INTERNAL MEDICINE

## 2020-07-22 PROCEDURE — 1126F AMNT PAIN NOTED NONE PRSNT: CPT | Mod: HCNC,S$GLB,, | Performed by: INTERNAL MEDICINE

## 2020-07-22 PROCEDURE — 1126F PR PAIN SEVERITY QUANTIFIED, NO PAIN PRESENT: ICD-10-PCS | Mod: HCNC,S$GLB,, | Performed by: INTERNAL MEDICINE

## 2020-07-22 PROCEDURE — 3008F BODY MASS INDEX DOCD: CPT | Mod: HCNC,CPTII,S$GLB, | Performed by: INTERNAL MEDICINE

## 2020-07-22 PROCEDURE — 83516 IMMUNOASSAY NONANTIBODY: CPT | Mod: HCNC

## 2020-07-22 PROCEDURE — 84443 ASSAY THYROID STIM HORMONE: CPT | Mod: HCNC

## 2020-07-22 PROCEDURE — 1101F PT FALLS ASSESS-DOCD LE1/YR: CPT | Mod: HCNC,CPTII,S$GLB, | Performed by: INTERNAL MEDICINE

## 2020-07-22 PROCEDURE — 36415 COLL VENOUS BLD VENIPUNCTURE: CPT | Mod: HCNC

## 2020-07-22 PROCEDURE — 99204 OFFICE O/P NEW MOD 45 MIN: CPT | Mod: HCNC,S$GLB,, | Performed by: INTERNAL MEDICINE

## 2020-07-22 RX ORDER — POLYETHYLENE GLYCOL 3350, SODIUM SULFATE ANHYDROUS, SODIUM BICARBONATE, SODIUM CHLORIDE, POTASSIUM CHLORIDE 236; 22.74; 6.74; 5.86; 2.97 G/4L; G/4L; G/4L; G/4L; G/4L
4 POWDER, FOR SOLUTION ORAL ONCE
Qty: 4000 ML | Refills: 0 | Status: SHIPPED | OUTPATIENT
Start: 2020-07-22 | End: 2020-07-22

## 2020-07-22 NOTE — PROGRESS NOTES
[facility] Consultation Note    Reason for Consult:  The primary encounter diagnosis was Change in bowel habits. Diagnoses of Left lower quadrant abdominal pain, Hydronephrosis, unspecified hydronephrosis type, Abnormal CT of the abdomen, and Constipation, chronic were also pertinent to this visit.    PCP:   Michelle Bravo   [address] [facility] / [place] [address]    Referring MD:  Abby Ang Md  [address] [facility] [id number]    Initial History of Present Illness (HPI):  This is a 70 y.o. female here for evaluation of change in bowel habits.  Patient's last colonoscopy was in 2015 it was normal.  Patient with a recent CT scan renal stone protocol showing bilateral hydronephrosis diverticulosis without diverticulitis.  Patient has been having some new straining when having a bowel movement but not truly constipated having a bowel movement every other day.  Patient has no family history of colon cancer no family history of advanced colon adenomas polyps no FAP no attenuated FAP no MA P no Armijo syndrome no celiac sprue or inflammatory bowel disease.  Patient says she has never had colon polyps before not losing weight always been thin.  In 2019 last year she had an EGD she said benign stomach polyps H pylori was negative she said rest of her path was normal.  We do not have a copy of that.  Patient has been having intermittent left lower quadrant discomfort occasionally not related to bowel movements.  She was referred here for colonoscopy.    Abdominal pain - as a  Reflux - none on her PPI  Dysphagia - no   Bowel habits - normal  GI bleeding - none  NSAID usage - none    Interval HPI 07/22/2020:  The patient's last visit with me was on Visit date not found.      ROS:  Constitutional: No fevers, chills, No weight loss  ENT:  No heartburn no dysphagia no odynophagia no hoarseness  CV: No chest pain, no palpitation  Pulm: No cough, No shortness of breath,  "no wheezing  Ophtho: No vision changes  GI: see HPI  Derm: No rash, no itching  Heme: No lymphadenopathy, No easy bruising  MSK: No significant arthritis  : No dysuria, No hematuria  Endo: No hot or cold intolerance  Neuro: No syncope, No seizure, no strokes  Psych: No uncontrolled anxiety, No uncontrolled depression    Medical History:  has a past medical history of Arthritis, Multiple thyroid nodules, Osteoporosis, Reflux, and 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    Surgical History:  has no past surgical history on file.    Family History: family history includes Cancer in her father; Cataracts in her mother..     Social History:  reports that she has never smoked. She has never used smokeless tobacco. She reports that she does not drink alcohol or use drugs.  She moved from [facility] [place] she just retired from [employer].  She is single never  no children.  She does have family in town.  Review of patient's allergies indicates:   Allergen Reactions    Ciprofloxacin      palpitations    Flagyl [metronidazole hcl]      Cause urinary frequency       Medication List with Changes/Refills   Current Medications    ALENDRONATE (FOSAMAX) 70 MG TABLET    TAKE 1 TAB (70 MG TOTAL) ORALLY EVERY 7 DAYS    ESOMEPRAZOLE (NEXIUM) 40 MG CAPSULE    TAKE ONE CAPSULE BY MOUTH EVERY DAY BEFORE BREAKFAST    ESTRADIOL (ESTRACE) 0.01 % (0.1 MG/GRAM) VAGINAL CREAM    Place 0.5 g vaginally twice a week.    MULTIVITAMIN (THERAGRAN) PER TABLET    Take 1 tablet by mouth Daily.    ONDANSETRON (ZOFRAN-ODT) 4 MG TBDL    Take 1 tablet (4 mg total) by mouth every 6 (six) hours as needed.         Objective Findings:    Vital Signs:  /71 (BP Location: Right arm, Patient Position: Sitting)   Pulse 97   Ht 5' 4" (1.626 m)   Wt 43.7 kg (96 lb 5.5 oz)   LMP 08/29/1998   BMI 16.54 kg/m²   Body mass index is 16.54 kg/m².    Physical Exam:  General Appearance: Well appearing in no acute distress.  Thin " appearing  Eyes:    No scleral icterus  ENT:  No lesions or masses   Lungs: CTA bilaterally, no wheezes, no rhonchi, no rales  Heart:  S1, S2 normal, no murmurs heard  Abdomen:  Non distended, soft, no guarding, no rebound, no tenderness, no appreciated ascites, no bruits, no hepatosplenomegaly,  No CVA tenderness, no appreciated hernias no Hassan sign no McBurney point not tender  Musculoskeletal:  No major joint deformities  Skin: No petechiae or rash on exposed skin areas  Neurologic:  Alert and oriented x4  Psychiatric:  Normal speech mentation and affect    Labs:  Lab Results   Component Value Date    WBC 5.60 05/18/2020    HGB 12.4 05/18/2020    HCT 39.4 05/18/2020     05/18/2020    CHOL 221 (H) 04/03/2019    TRIG 98 04/03/2019    HDL 90 (H) 04/03/2019    ALT 16 05/18/2020    AST 29 05/18/2020     (L) 05/18/2020    K 4.4 05/18/2020     05/18/2020    CREATININE 0.9 05/18/2020    BUN 13 05/18/2020    CO2 22 (L) 05/18/2020    TSH 1.673 04/03/2019    PSA <0.01 09/24/2014    INR 1.0 01/17/2004    HGBA1C 5.6 11/17/2015               Medical Decision Making:  CT scan images personally reviewed by myself as well as report below patient has no right lower quadrant tenderness no McBurney point tenderness  Impression:     1. Moderate bilateral hydronephrosis without significant renal dilation.  The urinary bladder is distended without wall thickening.  Renal findings could reflect sequela of reflux versus chronic UPJ obstruction.  2. The suspected appendix is prominent and fluid-filled although no surrounding inflammation.  Clinical correlation with any focal tenderness in the right quadrant as warranted.  3. Moderate to large amount of stool within the colon, developing constipation is a consideration.  4. Additional findings above.  Electronically signed by: Serjio Ayers MD  Lab work reviewed  Colonoscopy talk given  No upper GI symptoms on review of system today  Spoke with patient if she would  like to bring a copy of her EGD path a be happy to review it for  Assessment:  1.  Change in bowel habits with left lower quadrant tenderness CT scan showing diverticulosis no diverticulitis.  2.  Bilateral hydronephrosis with significant renal dilation followed by Urology.     Recommendations:   1.  Recommend colonoscopy for further evaluation of change in bowel habits and left lower quadrant intermittent pain.  2.  Return to GI clinic 3 months okay for telemedicine video visit.    No follow-ups on file.      Order summary:  Orders Placed This Encounter    TSH    IgA    TISSUE TRANSGLUTAMINASE (TTG), IGA    Case request GI: COLONOSCOPY         Thank you so much for allowing me to participate in the care of Marshall Almanza    Herminio Franco MD

## 2020-07-22 NOTE — Clinical Note
Marta please send patient for lab work today and to the schedulers to pick a date for her colonoscopy.    Return to GI clinic in 3 months okay for telemedicine video visit

## 2020-07-22 NOTE — LETTER
July 22, 2020      Abby Ang MD  [address]  [place] [address]           [facility]  [address]  [place] [address]  Phone: [phone]  Fax: [phone]          Patient: Marshall Almanza   MR Number: [id number]   YOB: 1950   Date of Visit: 7/22/2020       Dear Dr. Abby Ang:    Thank you for referring Marshall Almanza to me for evaluation. Attached you will find relevant portions of my assessment and plan of care.    If you have questions, please do not hesitate to call me. I look forward to following Marshall Almanza along with you.    Sincerely,    Herminio Franco MD    Enclosure  CC:  No Recipients    If you would like to receive this communication electronically, please contact [email or web address] or [phone] to request more information on [employer] access.    For providers and/or their staff who would like to refer a patient to [facility], please contact us through our one-stop-shop provider referral line, [facility] [place], at [phone].    If you feel you have received this communication in error or would no longer like to receive these types of communications, please e-mail [email or web address]

## 2020-07-24 LAB — TTG IGA SER-ACNC: 4 UNITS

## 2020-07-27 ENCOUNTER — TELEPHONE (OUTPATIENT)
Dept: UROGYNECOLOGY | Facility: CLINIC | Age: 70
End: 2020-07-27

## 2020-07-27 NOTE — TELEPHONE ENCOUNTER
LVM for patient to call back to schedule follow up appointment with me sometime in the next month or so. Will do another urine culture and pap smear at that visit.

## 2020-08-04 ENCOUNTER — TELEPHONE (OUTPATIENT)
Dept: GASTROENTEROLOGY | Facility: CLINIC | Age: 70
End: 2020-08-04

## 2020-08-04 NOTE — TELEPHONE ENCOUNTER
----- Message from Stefanie Hughes sent at 8/4/2020  3:26 PM CDT -----  Contact: pt  Pt is returning call to the clinic       Please contact pt: [phone]

## 2020-08-04 NOTE — TELEPHONE ENCOUNTER
----- Message from Katerine Oneal MA sent at 8/4/2020  2:36 PM CDT -----  Contact: [phone]  Returning a call  re: lab results     [phone]

## 2020-08-04 NOTE — TELEPHONE ENCOUNTER
Called and relayed message re pt labs to pt.  Pt verbalized understanding and appreciated the call.

## 2020-08-04 NOTE — TELEPHONE ENCOUNTER
----- Message from Herminio Franco MD sent at 8/2/2020 11:17 AM CDT -----  Marta please tell patient her labs look good

## 2020-09-08 ENCOUNTER — TELEPHONE (OUTPATIENT)
Dept: UROGYNECOLOGY | Facility: CLINIC | Age: 70
End: 2020-09-08

## 2020-09-08 ENCOUNTER — TELEPHONE (OUTPATIENT)
Dept: PRIMARY CARE CLINIC | Facility: CLINIC | Age: 70
End: 2020-09-08

## 2020-09-08 NOTE — TELEPHONE ENCOUNTER
----- Message from Leighton Weaver PA-C sent at 9/4/2020  4:28 PM CDT -----  Hey, she does not have a follow up appointment with me, can you please schedule her?    Thanks!

## 2020-09-08 NOTE — TELEPHONE ENCOUNTER
----- Message from Joleen Shaw sent at 9/8/2020 10:21 AM CDT -----  Regarding: Pt self Mobile/Home [phone]  Doctor appointment and lab have been scheduled.  Please link lab orders to the lab appointment.  Date of doctor appointment:  10/07/2020  Date of lab appointment:  09/30/2020  Physical or EP: EP/Annual Physical   Comments: Patient also would like to put in an order for and ultrasound for her tyroid please.

## 2020-09-11 ENCOUNTER — LAB VISIT (OUTPATIENT)
Dept: URGENT CARE | Facility: CLINIC | Age: 70
End: 2020-09-11
Payer: MEDICARE

## 2020-09-11 VITALS — HEART RATE: 102 BPM | OXYGEN SATURATION: 98 % | TEMPERATURE: 99 F

## 2020-09-11 DIAGNOSIS — Z01.818 PRE-OP TESTING: ICD-10-CM

## 2020-09-11 PROCEDURE — U0003 INFECTIOUS AGENT DETECTION BY NUCLEIC ACID (DNA OR RNA); SEVERE ACUTE RESPIRATORY SYNDROME CORONAVIRUS 2 (SARS-COV-2) (CORONAVIRUS DISEASE [COVID-19]), AMPLIFIED PROBE TECHNIQUE, MAKING USE OF HIGH THROUGHPUT TECHNOLOGIES AS DESCRIBED BY CMS-2020-01-R: HCPCS | Mod: HCNC

## 2020-09-12 LAB — SARS-COV-2 RNA RESP QL NAA+PROBE: NOT DETECTED

## 2020-09-14 ENCOUNTER — ANESTHESIA (OUTPATIENT)
Dept: ENDOSCOPY | Facility: HOSPITAL | Age: 70
End: 2020-09-14
Payer: MEDICARE

## 2020-09-14 ENCOUNTER — HOSPITAL ENCOUNTER (OUTPATIENT)
Facility: HOSPITAL | Age: 70
Discharge: HOME | End: 2020-09-14
Attending: INTERNAL MEDICINE | Admitting: INTERNAL MEDICINE
Payer: MEDICARE

## 2020-09-14 ENCOUNTER — ANESTHESIA EVENT (OUTPATIENT)
Dept: ENDOSCOPY | Facility: HOSPITAL | Age: 70
End: 2020-09-14
Payer: MEDICARE

## 2020-09-14 VITALS
SYSTOLIC BLOOD PRESSURE: 106 MMHG | OXYGEN SATURATION: 99 % | DIASTOLIC BLOOD PRESSURE: 56 MMHG | BODY MASS INDEX: 16.56 KG/M2 | WEIGHT: 97 LBS | RESPIRATION RATE: 14 BRPM | HEIGHT: 64 IN | TEMPERATURE: 98 F | HEART RATE: 69 BPM

## 2020-09-14 DIAGNOSIS — R19.4 CHANGE IN BOWEL HABITS: ICD-10-CM

## 2020-09-14 PROCEDURE — 27201089 HC SNARE, DISP (ANY): Mod: HCNC | Performed by: INTERNAL MEDICINE

## 2020-09-14 PROCEDURE — E9220 PRA ENDO ANESTHESIA: HCPCS | Mod: HCNC,,, | Performed by: NURSE ANESTHETIST, CERTIFIED REGISTERED

## 2020-09-14 PROCEDURE — 45385 COLONOSCOPY W/LESION REMOVAL: CPT | Mod: HCNC,,, | Performed by: INTERNAL MEDICINE

## 2020-09-14 PROCEDURE — 88305 TISSUE EXAM BY PATHOLOGIST: CPT | Mod: 26,HCNC,, | Performed by: PATHOLOGY

## 2020-09-14 PROCEDURE — 25000003 PHARM REV CODE 250: Mod: HCNC | Performed by: INTERNAL MEDICINE

## 2020-09-14 PROCEDURE — 45385 COLONOSCOPY W/LESION REMOVAL: CPT | Mod: HCNC | Performed by: INTERNAL MEDICINE

## 2020-09-14 PROCEDURE — 45385 PR COLONOSCOPY,REMV LESN,SNARE: ICD-10-PCS | Mod: HCNC,,, | Performed by: INTERNAL MEDICINE

## 2020-09-14 PROCEDURE — 37000008 HC ANESTHESIA 1ST 15 MINUTES: Mod: HCNC | Performed by: INTERNAL MEDICINE

## 2020-09-14 PROCEDURE — 63600175 PHARM REV CODE 636 W HCPCS: Mod: HCNC | Performed by: NURSE ANESTHETIST, CERTIFIED REGISTERED

## 2020-09-14 PROCEDURE — E9220 PRA ENDO ANESTHESIA: ICD-10-PCS | Mod: HCNC,,, | Performed by: NURSE ANESTHETIST, CERTIFIED REGISTERED

## 2020-09-14 PROCEDURE — 37000009 HC ANESTHESIA EA ADD 15 MINS: Mod: HCNC | Performed by: INTERNAL MEDICINE

## 2020-09-14 PROCEDURE — 88305 TISSUE EXAM BY PATHOLOGIST: ICD-10-PCS | Mod: 26,HCNC,, | Performed by: PATHOLOGY

## 2020-09-14 PROCEDURE — 88305 TISSUE EXAM BY PATHOLOGIST: CPT | Mod: HCNC | Performed by: PATHOLOGY

## 2020-09-14 RX ORDER — LIDOCAINE HCL/PF 100 MG/5ML
SYRINGE (ML) INTRAVENOUS
Status: DISCONTINUED | OUTPATIENT
Start: 2020-09-14 | End: 2020-09-14

## 2020-09-14 RX ORDER — PROPOFOL 10 MG/ML
VIAL (ML) INTRAVENOUS
Status: DISCONTINUED | OUTPATIENT
Start: 2020-09-14 | End: 2020-09-14

## 2020-09-14 RX ORDER — PROPOFOL 10 MG/ML
VIAL (ML) INTRAVENOUS CONTINUOUS PRN
Status: DISCONTINUED | OUTPATIENT
Start: 2020-09-14 | End: 2020-09-14

## 2020-09-14 RX ORDER — SODIUM CHLORIDE 9 MG/ML
INJECTION, SOLUTION INTRAVENOUS CONTINUOUS
Status: DISCONTINUED | OUTPATIENT
Start: 2020-09-14 | End: 2020-09-14 | Stop reason: HOSPADM

## 2020-09-14 RX ADMIN — Medication 40 MG: at 07:09

## 2020-09-14 RX ADMIN — SODIUM CHLORIDE: 0.9 INJECTION, SOLUTION INTRAVENOUS at 07:09

## 2020-09-14 RX ADMIN — PROPOFOL 150 MCG/KG/MIN: 10 INJECTION, EMULSION INTRAVENOUS at 07:09

## 2020-09-14 RX ADMIN — PROPOFOL 40 MG: 10 INJECTION, EMULSION INTRAVENOUS at 07:09

## 2020-09-14 NOTE — ANESTHESIA PREPROCEDURE EVALUATION
09/14/2020  Marshall Almanza is a 70 y.o., female.  Past Medical History:   Diagnosis Date    Arthritis     Multiple thyroid nodules     Osteoporosis     Reflux     Senile nuclear sclerosis - Both Eyes 10/1/2013       History reviewed. No pertinent surgical history.    Family History   Problem Relation Age of Onset    Cataracts Mother     Cancer Father         leukemia    Amblyopia Neg Hx     Blindness Neg Hx     Diabetes Neg Hx     Glaucoma Neg Hx     Hypertension Neg Hx     Macular degeneration Neg Hx     Retinal detachment Neg Hx     Strabismus Neg Hx     Stroke Neg Hx     Thyroid disease Neg Hx     Breast cancer Neg Hx     Colon cancer Neg Hx     Ovarian cancer Neg Hx        Social History     Socioeconomic History    Marital status: Single     Spouse name: Not on file    Number of children: 0    Years of education: Not on file    Highest education level: Not on file   Occupational History    Occupation:      Employer: [employer]   Social Needs    Financial resource strain: Not on file    Food insecurity     Worry: Not on file     Inability: Not on file    Transportation needs     Medical: Not on file     Non-medical: Not on file   Tobacco Use    Smoking status: Never Smoker    Smokeless tobacco: Never Used   Substance and Sexual Activity    Alcohol use: No    Drug use: No    Sexual activity: Not Currently   Lifestyle    Physical activity     Days per week: Not on file     Minutes per session: Not on file    Stress: Not on file   Relationships    Social connections     Talks on phone: Not on file     Gets together: Not on file     Attends [employer] service: Not on file     Active member of club or organization: Not on file     Attends meetings of clubs or organizations: Not on file     Relationship status: Not on file   Other Topics  Concern    Not on file   Social History Narrative    Not on file       No current facility-administered medications for this encounter.      Current Outpatient Medications   Medication Sig Dispense Refill    alendronate (FOSAMAX) 70 MG tablet TAKE 1 TAB (70 MG TOTAL) ORALLY EVERY 7 DAYS 4 tablet 12    esomeprazole (NEXIUM) 40 MG capsule TAKE ONE CAPSULE BY MOUTH EVERY DAY BEFORE BREAKFAST 30 capsule 11    estradioL (ESTRACE) 0.01 % (0.1 mg/gram) vaginal cream Place 0.5 g vaginally twice a week. 42.5 g 1    multivitamin (THERAGRAN) per tablet Take 1 tablet by mouth Daily.      ondansetron (ZOFRAN-ODT) 4 MG TbDL Take 1 tablet (4 mg total) by mouth every 6 (six) hours as needed. (Patient not taking: Reported on 7/22/2020) 20 tablet 0       Review of patient's allergies indicates:   Allergen Reactions    Ciprofloxacin      palpitations    Flagyl [metronidazole hcl]      Cause urinary frequency         Anesthesia Evaluation    I have reviewed the Patient Summary Reports.    I have reviewed the Nursing Notes.    I have reviewed the Medications.     Review of Systems  Anesthesia Hx:  No problems with previous Anesthesia  Denies Family Hx of Anesthesia complications.   Denies Personal Hx of Anesthesia complications.   Social:  Non-Smoker, No Alcohol Use    Hematology/Oncology:  Hematology Normal   Oncology Normal     EENT/Dental:EENT/Dental Normal   Cardiovascular:  Cardiovascular Normal Exercise tolerance: good     Pulmonary:  Pulmonary Normal    Renal/:   Hydronephrosis   Hepatic/GI:   Bowel Prep. GERD    Musculoskeletal:   Arthritis     Neurological:  Neurology Normal    Endocrine:  Endocrine Normal    Dermatological:  Skin Normal    Psych:  Psychiatric Normal           Physical Exam  General:  Well nourished    Airway/Jaw/Neck:  Airway Findings: Mouth Opening: Normal Tongue: Normal  General Airway Assessment: Adult, Good  Mallampati: II  Improves to I with phonation.  TM Distance: Normal, at least 6 cm         Eyes/Ears/Nose:  EYES/EARS/NOSE FINDINGS: Normal   Dental:  DENTAL FINDINGS: Normal   Chest/Lungs:  Chest/Lungs Clear    Heart/Vascular:  Heart Findings: Normal Heart murmur: negative Vascular Findings: Normal    Abdomen:  Abdomen Findings: Normal    Musculoskeletal:  Musculoskeletal Findings: Normal   Skin:  Skin Findings: Normal    Mental Status:  Mental Status Findings: Normal        Anesthesia Plan  Type of Anesthesia, risks & benefits discussed:  Anesthesia Type:  general  Patient's Preference: General  Intra-op Monitoring Plan: standard ASA monitors  Intra-op Monitoring Plan Comments:   Post Op Pain Control Plan: multimodal analgesia  Post Op Pain Control Plan Comments:   Induction:   IV  Beta Blocker:  Patient is not currently on a Beta-Blocker (No further documentation required).       Informed Consent: Patient understands risks and agrees with Anesthesia plan.  Questions answered. Anesthesia consent signed with patient.  ASA Score: 2     Day of Surgery Review of History & Physical: I have interviewed and examined the patient. I have reviewed the patient's H&P dated:  There are no significant changes.  H&P update referred to the provider.         Ready For Surgery From Anesthesia Perspective.

## 2020-09-14 NOTE — [employer] PATIENT INSTRUCTIONS
Discharge Summary/Instructions after an Endoscopic Procedure  Patient Name: Marshall Almanza  Patient MRN: [id number]  Patient YOB: 1950  Monday, September 14, 2020  Herminio Franco MD  RESTRICTIONS:  During your procedure today, you received medications for sedation.  These   medications may affect your judgment, balance and coordination.  Therefore,   for 24 hours, you have the following restrictions:   - DO NOT drive a car, operate machinery, make legal/financial decisions,   sign important papers or drink alcohol.    ACTIVITY:  Today: no heavy lifting, straining or running due to procedural   sedation/anesthesia.  The following day: return to full activity including work.  DIET:  Eat and drink normally unless instructed otherwise.     TREATMENT FOR COMMON SIDE EFFECTS:  - Mild abdominal pain, nausea, belching, bloating or excessive gas:  rest,   eat lightly and use a heating pad.  - Sore Throat: treat with throat lozenges and/or gargle with warm salt   water.  - Because air was used during the procedure, expelling large amounts of air   from your rectum or belching is normal.  - If a bowel prep was taken, you may not have a bowel movement for 1-3 days.    This is normal.  SYMPTOMS TO WATCH FOR AND REPORT TO YOUR PHYSICIAN:  1. Abdominal pain or bloating, other than gas cramps.  2. Chest pain.  3. Back pain.  4. Signs of infection such as: chills or fever occurring within 24 hours   after the procedure.  5. Rectal bleeding, which would show as bright red, maroon, or black stools.   (A tablespoon of blood from the rectum is not serious, especially if   hemorrhoids are present.)  6. Vomiting.  7. Weakness or dizziness.  GO DIRECTLY TO THE NEAREST EMERGENCY ROOM IF YOU HAVE ANY OF THE FOLLOWING:      Difficulty breathing              Chills and/or fever over 101 F   Persistent vomiting and/or vomiting blood   Severe abdominal pain   Severe chest pain   Black, tarry stools   Bleeding- more than one  tablespoon   Any other symptom or condition that you feel may need urgent attention  Your doctor recommends these additional instructions:  If any biopsies were taken, your doctors clinic will contact you in 1 to 2   weeks with any results.  - Discharge patient to home.   - Await pathology results.   - Telephone endoscopist for pathology results in 2 weeks.   - Repeat colonoscopy in 5 years for surveillance based on pathology results.     - The findings and recommendations were discussed with the patient.   - Return to primary care physician.   - The findings and recommendations were discussed with the patient.  For questions, problems or results please call your physician - Herminio Franco MD at Work:  [phone].  [facility] [place], EMERGENCY ROOM PHONE NUMBER: [phone]  IF A COMPLICATION OR EMERGENCY SITUATION ARISES AND YOU ARE UNABLE TO REACH   YOUR PHYSICIAN - GO DIRECTLY TO THE EMERGENCY ROOM.  Herminio Franco MD  9/14/2020 7:58:00 AM  This report has been verified and signed electronically.  [employer]

## 2020-09-14 NOTE — TRANSFER OF CARE
"Anesthesia Transfer of Care Note    Patient: Marshall Almanza    Procedure(s) Performed: Procedure(s) (LRB):  COLONOSCOPY (N/A)    Patient location: GI    Anesthesia Type: general    Transport from OR: Transported from OR on room air with adequate spontaneous ventilation    Post pain: adequate analgesia    Post assessment: no apparent anesthetic complications and tolerated procedure well    Post vital signs: stable    Level of consciousness: awake    Nausea/Vomiting: no nausea/vomiting    Complications: none    Transfer of care protocol was followed      Last vitals:   Visit Vitals  BP (!) 96/50   Pulse 65   Temp 36.5 °C (97.7 °F)   Resp 14   Ht 5' 4" (1.626 m)   Wt 44 kg (97 lb)   LMP 08/29/1998   SpO2 100%   Breastfeeding No   BMI 16.65 kg/m²     "

## 2020-09-14 NOTE — DISCHARGE INSTRUCTIONS
Colonoscopy     A camera attached to a flexible tube with a viewing lens is used to take video pictures.     Colonoscopy is a test to view the inside of your lower digestive tract (colon and rectum). Sometimes it can show the last part of the small intestine (ileum). During the test, small pieces of tissue may be removed for testing. This is called a biopsy. Small growths, such as polyps, may also be removed.   Why is colonoscopy done?  The test is done to help look for colon cancer. And it can help find the source of abdominal pain, bleeding, and changes in bowel habits. It may be needed once a year, depending on factors such as your:  · Age  · Health history  · Family health history  · Symptoms  · Results from any prior colonoscopy  Risks and possible complications  These include:  · Bleeding               · A puncture or tear in the colon   · Risks of anesthesia  · A cancer lesion not being seen  Getting ready   To prepare for the test:  · Talk with your healthcare provider about the risks of the test (see below). Also ask your healthcare provider about alternatives to the test.  · Tell your healthcare provider about any medicines you take. Also tell him or her about any health conditions you may have.  · Make sure your rectum and colon are empty for the test. Follow the diet and bowel prep instructions exactly. If you dont, the test may need to be rescheduled.  · Plan for a friend or family member to drive you home after the test.     Colonoscopy provides an inside view of the entire colon.     You may discuss the results with your doctor right away or at a future visit.  During the test   The test is usually done in the hospital on an outpatient basis. This means you go home the same day. The procedure takes about 30 minutes. During that time:  · You are given relaxing (sedating) medicine through an IV line. You may be drowsy, or fully asleep.  · The healthcare provider will first give you a physical exam to  check for anal and rectal problems.  · Then the anus is lubricated and the scope inserted.  · If you are awake, you may have a feeling similar to needing to have a bowel movement. You may also feel pressure as air is pumped into the colon. Its OK to pass gas during the procedure.  · Biopsy, polyp removal, or other treatments may be done during the test.  After the test   You may have gas right after the test. It can help to try to pass it to help prevent later bloating. Your healthcare provider may discuss the results with you right away. Or you may need to schedule a follow-up visit to talk about the results. After the test, you can go back to your normal eating and other activities. You may be tired from the sedation and need to rest for a few hours.  Date Last Reviewed: 11/1/2016 © 2000-2017 [employer] [address] [place] [address], [place] [address]. All rights reserved. This information is not intended as a substitute for professional medical care. Always follow your healthcare professional's instructions.

## 2020-09-14 NOTE — H&P
[facility] [facility]  History & Physical    Subjective:      Chief Complaint/Reason for Admission:    Colonoscopy    Marshall Almanza is a 70 y.o. female.    Past Medical History:   Diagnosis Date    Arthritis     Multiple thyroid nodules     Osteoporosis     Reflux     Senile nuclear sclerosis - Both Eyes 10/1/2013     History reviewed. No pertinent surgical history.  Family History   Problem Relation Age of Onset    Cataracts Mother     Cancer Father         leukemia    Amblyopia Neg Hx     Blindness Neg Hx     Diabetes Neg Hx     Glaucoma Neg Hx     Hypertension Neg Hx     Macular degeneration Neg Hx     Retinal detachment Neg Hx     Strabismus Neg Hx     Stroke Neg Hx     Thyroid disease Neg Hx     Breast cancer Neg Hx     Colon cancer Neg Hx     Ovarian cancer Neg Hx      Social History     Tobacco Use    Smoking status: Never Smoker    Smokeless tobacco: Never Used   Substance Use Topics    Alcohol use: No    Drug use: No       PTA Medications   Medication Sig    alendronate (FOSAMAX) 70 MG tablet TAKE 1 TAB (70 MG TOTAL) ORALLY EVERY 7 DAYS    esomeprazole (NEXIUM) 40 MG capsule TAKE ONE CAPSULE BY MOUTH EVERY DAY BEFORE BREAKFAST    multivitamin (THERAGRAN) per tablet Take 1 tablet by mouth Daily.    estradioL (ESTRACE) 0.01 % (0.1 mg/gram) vaginal cream Place 0.5 g vaginally twice a week.    ondansetron (ZOFRAN-ODT) 4 MG TbDL Take 1 tablet (4 mg total) by mouth every 6 (six) hours as needed. (Patient not taking: Reported on 7/22/2020)     Review of patient's allergies indicates:   Allergen Reactions    Ciprofloxacin      palpitations    Flagyl [metronidazole hcl]      Cause urinary frequency        Review of Systems   Constitutional: Negative for chills, fever and weight loss.   Respiratory: Negative for shortness of breath and wheezing.    Cardiovascular: Negative for chest pain.   Gastrointestinal: Negative for abdominal pain.       Objective:      Vital Signs (Most  Recent)  Temp: 98 °F (36.7 °C) (09/14/20 0709)  Pulse: 84 (09/14/20 0709)  Resp: 15 (09/14/20 0709)  BP: (!) 113/56 (09/14/20 0709)  SpO2: 98 % (09/14/20 0709)    Vital Signs Range (Last 24H):  Temp:  [98 °F (36.7 °C)]   Pulse:  [84]   Resp:  [15]   BP: (113)/(56)   SpO2:  [98 %]     Physical Exam  Pulmonary:      Effort: Pulmonary effort is normal.   Abdominal:      General: Abdomen is flat.   Neurological:      Mental Status: She is alert and oriented to person, place, and time.   Psychiatric:         Mood and Affect: Mood normal.         Behavior: Behavior normal.         Thought Content: Thought content normal.         Judgment: Judgment normal.              Assessment:      Active Hospital Problems    Diagnosis  POA    Change in bowel habits [R19.4]  Yes      Resolved Hospital Problems   No resolved problems to display.       Plan:    Colonoscopy for change in bowel habits

## 2020-09-14 NOTE — ANESTHESIA POSTPROCEDURE EVALUATION
Anesthesia Post Evaluation    Patient: Marshall Almanza    Procedure(s) Performed: Procedure(s) (LRB):  COLONOSCOPY (N/A)    Final Anesthesia Type: general    Patient location during evaluation: GI PACU  Patient participation: Yes- Able to Participate  Level of consciousness: awake  Post-procedure vital signs: reviewed and stable  Pain management: adequate  Airway patency: patent    PONV status at discharge: No PONV  Anesthetic complications: no      Cardiovascular status: blood pressure returned to baseline  Respiratory status: unassisted  Hydration status: euvolemic  Follow-up not needed.          Vitals Value Taken Time   /56 09/14/20 0828   Temp 36.5 °C (97.7 °F) 09/14/20 0757   Pulse 69 09/14/20 0828   Resp 14 09/14/20 0828   SpO2 99 % 09/14/20 0828         Event Time   Out of Recovery 08:30:00         Pain/Vitor Score: Vitor Score: 10 (9/14/2020  8:29 AM)

## 2020-09-17 ENCOUNTER — TELEPHONE (OUTPATIENT)
Dept: UROGYNECOLOGY | Facility: CLINIC | Age: 70
End: 2020-09-17

## 2020-09-17 NOTE — TELEPHONE ENCOUNTER
----- Message from Leighton Weaver PA-C sent at 9/17/2020  1:13 PM CDT -----  Regarding: New appt  Hey,     I think you may have tried once, but could you try again to get her rescheduled for a follow up appointment?    Thanks!

## 2020-09-22 LAB
FINAL PATHOLOGIC DIAGNOSIS: NORMAL
GROSS: NORMAL

## 2020-09-30 ENCOUNTER — LAB VISIT (OUTPATIENT)
Dept: LAB | Facility: HOSPITAL | Age: 70
End: 2020-09-30
Attending: FAMILY MEDICINE
Payer: MEDICARE

## 2020-09-30 DIAGNOSIS — K21.9 GASTROESOPHAGEAL REFLUX DISEASE WITHOUT ESOPHAGITIS: ICD-10-CM

## 2020-09-30 DIAGNOSIS — E07.9 THYROID CONDITION: ICD-10-CM

## 2020-09-30 DIAGNOSIS — R79.0 ABNORMAL LEVEL OF BLOOD MINERAL: ICD-10-CM

## 2020-09-30 LAB
ALBUMIN SERPL BCP-MCNC: 4.2 G/DL (ref 3.5–5.2)
ALP SERPL-CCNC: 49 U/L (ref 55–135)
ALT SERPL W/O P-5'-P-CCNC: 17 U/L (ref 10–44)
ANION GAP SERPL CALC-SCNC: 11 MMOL/L (ref 8–16)
AST SERPL-CCNC: 24 U/L (ref 10–40)
BASOPHILS # BLD AUTO: 0.02 K/UL (ref 0–0.2)
BASOPHILS NFR BLD: 0.5 % (ref 0–1.9)
BILIRUB SERPL-MCNC: 0.9 MG/DL (ref 0.1–1)
BUN SERPL-MCNC: 14 MG/DL (ref 8–23)
CALCIUM SERPL-MCNC: 9.3 MG/DL (ref 8.7–10.5)
CHLORIDE SERPL-SCNC: 103 MMOL/L (ref 95–110)
CHOLEST SERPL-MCNC: 255 MG/DL (ref 120–199)
CHOLEST/HDLC SERPL: 2.5 {RATIO} (ref 2–5)
CO2 SERPL-SCNC: 28 MMOL/L (ref 23–29)
CREAT SERPL-MCNC: 0.8 MG/DL (ref 0.5–1.4)
DIFFERENTIAL METHOD: ABNORMAL
EOSINOPHIL # BLD AUTO: 0 K/UL (ref 0–0.5)
EOSINOPHIL NFR BLD: 0.2 % (ref 0–8)
ERYTHROCYTE [DISTWIDTH] IN BLOOD BY AUTOMATED COUNT: 13.6 % (ref 11.5–14.5)
EST. GFR  (AFRICAN AMERICAN): >60 ML/MIN/1.73 M^2
EST. GFR  (NON AFRICAN AMERICAN): >60 ML/MIN/1.73 M^2
GLUCOSE SERPL-MCNC: 96 MG/DL (ref 70–110)
HCT VFR BLD AUTO: 46.2 % (ref 37–48.5)
HDLC SERPL-MCNC: 102 MG/DL (ref 40–75)
HDLC SERPL: 40 % (ref 20–50)
HGB BLD-MCNC: 13.9 G/DL (ref 12–16)
IMM GRANULOCYTES # BLD AUTO: 0.01 K/UL (ref 0–0.04)
IMM GRANULOCYTES NFR BLD AUTO: 0.2 % (ref 0–0.5)
LDLC SERPL CALC-MCNC: 131 MG/DL (ref 63–159)
LYMPHOCYTES # BLD AUTO: 1 K/UL (ref 1–4.8)
LYMPHOCYTES NFR BLD: 23.7 % (ref 18–48)
MCH RBC QN AUTO: 29.6 PG (ref 27–31)
MCHC RBC AUTO-ENTMCNC: 30.1 G/DL (ref 32–36)
MCV RBC AUTO: 98 FL (ref 82–98)
MONOCYTES # BLD AUTO: 0.3 K/UL (ref 0.3–1)
MONOCYTES NFR BLD: 7.3 % (ref 4–15)
NEUTROPHILS # BLD AUTO: 2.8 K/UL (ref 1.8–7.7)
NEUTROPHILS NFR BLD: 68.1 % (ref 38–73)
NONHDLC SERPL-MCNC: 153 MG/DL
NRBC BLD-RTO: 0 /100 WBC
PLATELET # BLD AUTO: 236 K/UL (ref 150–350)
PMV BLD AUTO: 10.3 FL (ref 9.2–12.9)
POTASSIUM SERPL-SCNC: 4.6 MMOL/L (ref 3.5–5.1)
PROT SERPL-MCNC: 7.4 G/DL (ref 6–8.4)
RBC # BLD AUTO: 4.7 M/UL (ref 4–5.4)
SODIUM SERPL-SCNC: 142 MMOL/L (ref 136–145)
TRIGL SERPL-MCNC: 110 MG/DL (ref 30–150)
TSH SERPL DL<=0.005 MIU/L-ACNC: 2.76 UIU/ML (ref 0.4–4)
WBC # BLD AUTO: 4.1 K/UL (ref 3.9–12.7)

## 2020-09-30 PROCEDURE — 80061 LIPID PANEL: CPT | Mod: HCNC

## 2020-09-30 PROCEDURE — 80053 COMPREHEN METABOLIC PANEL: CPT | Mod: HCNC

## 2020-09-30 PROCEDURE — 36415 COLL VENOUS BLD VENIPUNCTURE: CPT | Mod: HCNC,PN

## 2020-09-30 PROCEDURE — 85025 COMPLETE CBC W/AUTO DIFF WBC: CPT | Mod: HCNC

## 2020-09-30 PROCEDURE — 84443 ASSAY THYROID STIM HORMONE: CPT | Mod: HCNC

## 2020-10-07 ENCOUNTER — OFFICE VISIT (OUTPATIENT)
Dept: PRIMARY CARE CLINIC | Facility: CLINIC | Age: 70
End: 2020-10-07
Payer: MEDICARE

## 2020-10-07 DIAGNOSIS — E07.9 THYROID MASS: ICD-10-CM

## 2020-10-07 DIAGNOSIS — E46 PROTEIN-CALORIE MALNUTRITION, UNSPECIFIED SEVERITY: ICD-10-CM

## 2020-10-07 DIAGNOSIS — K58.9 IRRITABLE BOWEL SYNDROME, UNSPECIFIED TYPE: Primary | ICD-10-CM

## 2020-10-07 DIAGNOSIS — E78.5 HYPERLIPIDEMIA, UNSPECIFIED HYPERLIPIDEMIA TYPE: ICD-10-CM

## 2020-10-07 PROCEDURE — 99999 PR PBB SHADOW E&M-EST. PATIENT-LVL IV: ICD-10-PCS | Mod: PBBFAC,HCNC,, | Performed by: FAMILY MEDICINE

## 2020-10-07 PROCEDURE — 99999 PR PBB SHADOW E&M-EST. PATIENT-LVL IV: CPT | Mod: PBBFAC,HCNC,, | Performed by: FAMILY MEDICINE

## 2020-10-07 PROCEDURE — 99397 PR PREVENTIVE VISIT,EST,65 & OVER: ICD-10-PCS | Mod: HCNC,S$GLB,, | Performed by: FAMILY MEDICINE

## 2020-10-07 PROCEDURE — 99397 PER PM REEVAL EST PAT 65+ YR: CPT | Mod: HCNC,S$GLB,, | Performed by: FAMILY MEDICINE

## 2020-10-07 RX ORDER — HYOSCYAMINE SULFATE 0.125 MG
125 TABLET ORAL EVERY 4 HOURS PRN
Qty: 90 TABLET | Refills: 3 | Status: SHIPPED | OUTPATIENT
Start: 2020-10-07 | End: 2020-11-13 | Stop reason: ALTCHOICE

## 2020-10-08 NOTE — PROGRESS NOTES
Marshall Almanza is a 70 y.o. female   Routine physical  Source of history: Patient  Past Medical History:   Diagnosis Date    Arthritis     Multiple thyroid nodules     Osteoporosis     Reflux     Senile nuclear sclerosis - Both Eyes 10/1/2013     Patient  reports that she has never smoked. She has never used smokeless tobacco. She reports that she does not drink alcohol or use drugs.  Family History   Problem Relation Age of Onset    Cataracts Mother     Cancer Father         leukemia    Amblyopia Neg Hx     Blindness Neg Hx     Diabetes Neg Hx     Glaucoma Neg Hx     Hypertension Neg Hx     Macular degeneration Neg Hx     Retinal detachment Neg Hx     Strabismus Neg Hx     Stroke Neg Hx     Thyroid disease Neg Hx     Breast cancer Neg Hx     Colon cancer Neg Hx     Ovarian cancer Neg Hx      ROS:  GENERAL: No fever, chills, fatigability or weight loss.  SKIN: No rashes, itching or changes in color or texture of skin.  HEAD: No headaches or recent head trauma.  EYES: Visual acuity fine. No photophobia, ocular pain or diplopia.  EARS: Denies ear pain, discharge or vertigo.  NOSE: No loss of smell, no epistaxis or postnasal drip.  MOUTH & THROAT: No hoarseness or change in voice. No excessive gum bleeding.  NODES: Denies swollen glands.  CHEST: Denies BARBOSA, cyanosis, wheezing, cough and sputum production.  CARDIOVASCULAR: Denies chest pain, PND, orthopnea or reduced exercise tolerance.  ABDOMEN: Appetite fine. No weight loss. Denies diarrhea, abdominal pain, hematemesis or blood in stool.  URINARY: No flank pain, dysuria or hematuria.  PERIPHERAL VASCULAR: No claudication or cyanosis.  MUSCULOSKELETAL: No joint stiffness or swelling. Denies back pain.  NEUROLOGIC: No history of seizures, paralysis, alteration of gait or coordination.    OBJECTIVE:  APPEARANCE: thin no acute distress  Vitals:    10/07/20 1258   BP: (P) 121/65   Pulse: (P) 88   Resp: (P) 18   Temp: (P) 97.8 °F (36.6 °C)     SKIN: Normal  skin turgor, no lesions.  HEENT: Both external auditory canals clear. Both tympanic membranes intact.   PERRL. EOMI. Disk margins sharp. No tonsillar enlargement. No pharyngeal erythema or exudate. No stridor.  NECK: No bruits. No cervical spine tenderness. No cervical lymphadenopathy. No thyromegaly.  NODES: No cervical, axillary or inguinal lymph node enlargement.  CHEST: Breath sounds clear bilaterally. Lungs clear to auscultation & percussion.   Good air movement. No rales. No retractions. No rhonchi. No stridor. No wheezes.  CARDIOVASCULAR: Normal S1, S2. No murmurs. No edema.  BREASTS: no masses palpated in either breast or axillary area, symmetry noted.  ABDOMEN: Bowel sounds normal. No palpable aortic enlargement. No CVA tenderness.   No pulsatile mass. No rebound tenderness.  PERIPHERAL VASCULAR: Femoral pulses present and symmetrical. No edema.  MUSCULOSKELETAL: Degenerative changes of both ankles, foot, knee, wrist and hand.  BACK: No CVA tenderness. There is no spasm, tenderness or radiculopathy noted with palpation and there is full range of motion.   NEUROLOGIC:   Cranial Nerves: II-XII grossly intact.  Motor: 5/5 strength major flexors/extensors. No tremor.  DTR's: Knees, Ankles 2+ and equal bilaterally; downgoing toes.  Sensory: Intact to light touch distally.  Gait & Posture: Normal gait and fine motion. No cerebellar signs.  MENTAL STATUS: Alert. Oriented x 3. Language skills normal.   Memory intact. No suicidal ideation. Normal affect. Normal cognitive functions. Well kept appearance.    ASSESSMENT/PLAN:   Marshall was seen today for annual exam.    Diagnoses and all orders for this visit:    Irritable bowel syndrome, unspecified type  -     hyoscyamine (ANASPAZ,LEVSIN) 0.125 mg Tab; Take 1 tablet (125 mcg total) by mouth every 4 (four) hours as needed.    Thyroid mass  -     US Soft Tissue Head Neck Thyroid; Future    Hyperlipidemia, unspecified hyperlipidemia type  -     Lipid Panel; Future  -      Comprehensive Metabolic Panel; Future    labs were reviewed pt will resume healthy diet and exercise  Does not want cholesterol medication. Will contact pt with   Results of pending tests when they become available.  PT declines mammogram, bmd and colonoscopy at this time  She will have it done next year, she will delay her immunizations  To early next year. She accepts risks of delaying testing and immunizations

## 2020-10-13 ENCOUNTER — HOSPITAL ENCOUNTER (OUTPATIENT)
Dept: RADIOLOGY | Facility: HOSPITAL | Age: 70
Discharge: HOME | End: 2020-10-13
Attending: FAMILY MEDICINE
Payer: MEDICARE

## 2020-10-13 DIAGNOSIS — E07.9 THYROID MASS: ICD-10-CM

## 2020-10-13 PROCEDURE — 76536 US EXAM OF HEAD AND NECK: CPT | Mod: TC,HCNC

## 2020-10-13 PROCEDURE — 76536 US EXAM OF HEAD AND NECK: CPT | Mod: 26,HCNC,, | Performed by: RADIOLOGY

## 2020-10-13 PROCEDURE — 76536 US SOFT TISSUE HEAD NECK THYROID: ICD-10-PCS | Mod: 26,HCNC,, | Performed by: RADIOLOGY

## 2020-11-13 ENCOUNTER — TELEPHONE (OUTPATIENT)
Dept: GASTROENTEROLOGY | Facility: CLINIC | Age: 70
End: 2020-11-13

## 2020-11-13 ENCOUNTER — OFFICE VISIT (OUTPATIENT)
Dept: GASTROENTEROLOGY | Facility: CLINIC | Age: 70
End: 2020-11-13
Payer: MEDICARE

## 2020-11-13 VITALS
HEIGHT: 64 IN | DIASTOLIC BLOOD PRESSURE: 75 MMHG | HEART RATE: 85 BPM | SYSTOLIC BLOOD PRESSURE: 119 MMHG | WEIGHT: 95.44 LBS | BODY MASS INDEX: 16.29 KG/M2

## 2020-11-13 DIAGNOSIS — Z87.440 HISTORY OF UTI: ICD-10-CM

## 2020-11-13 DIAGNOSIS — K63.5 SERRATED POLYP OF COLON: Primary | ICD-10-CM

## 2020-11-13 DIAGNOSIS — Z51.81 ENCOUNTER FOR MONITORING LONG-TERM PROTON PUMP INHIBITOR THERAPY: ICD-10-CM

## 2020-11-13 DIAGNOSIS — R10.32 LLQ PAIN: ICD-10-CM

## 2020-11-13 DIAGNOSIS — Z79.899 ENCOUNTER FOR MONITORING LONG-TERM PROTON PUMP INHIBITOR THERAPY: ICD-10-CM

## 2020-11-13 DIAGNOSIS — N13.30 HYDRONEPHROSIS, UNSPECIFIED HYDRONEPHROSIS TYPE: ICD-10-CM

## 2020-11-13 PROCEDURE — 1125F AMNT PAIN NOTED PAIN PRSNT: CPT | Mod: HCNC,S$GLB,, | Performed by: INTERNAL MEDICINE

## 2020-11-13 PROCEDURE — 3008F PR BODY MASS INDEX (BMI) DOCUMENTED: ICD-10-PCS | Mod: HCNC,CPTII,S$GLB, | Performed by: INTERNAL MEDICINE

## 2020-11-13 PROCEDURE — 1159F MED LIST DOCD IN RCRD: CPT | Mod: HCNC,S$GLB,, | Performed by: INTERNAL MEDICINE

## 2020-11-13 PROCEDURE — 1101F PT FALLS ASSESS-DOCD LE1/YR: CPT | Mod: HCNC,CPTII,S$GLB, | Performed by: INTERNAL MEDICINE

## 2020-11-13 PROCEDURE — 1101F PR PT FALLS ASSESS DOC 0-1 FALLS W/OUT INJ PAST YR: ICD-10-PCS | Mod: HCNC,CPTII,S$GLB, | Performed by: INTERNAL MEDICINE

## 2020-11-13 PROCEDURE — 99999 PR PBB SHADOW E&M-EST. PATIENT-LVL IV: ICD-10-PCS | Mod: PBBFAC,HCNC,, | Performed by: INTERNAL MEDICINE

## 2020-11-13 PROCEDURE — 3008F BODY MASS INDEX DOCD: CPT | Mod: HCNC,CPTII,S$GLB, | Performed by: INTERNAL MEDICINE

## 2020-11-13 PROCEDURE — 99999 PR PBB SHADOW E&M-EST. PATIENT-LVL IV: CPT | Mod: PBBFAC,HCNC,, | Performed by: INTERNAL MEDICINE

## 2020-11-13 PROCEDURE — 1125F PR PAIN SEVERITY QUANTIFIED, PAIN PRESENT: ICD-10-PCS | Mod: HCNC,S$GLB,, | Performed by: INTERNAL MEDICINE

## 2020-11-13 PROCEDURE — 3288F FALL RISK ASSESSMENT DOCD: CPT | Mod: HCNC,CPTII,S$GLB, | Performed by: INTERNAL MEDICINE

## 2020-11-13 PROCEDURE — 99215 PR OFFICE/OUTPT VISIT, EST, LEVL V, 40-54 MIN: ICD-10-PCS | Mod: HCNC,S$GLB,, | Performed by: INTERNAL MEDICINE

## 2020-11-13 PROCEDURE — 99215 OFFICE O/P EST HI 40 MIN: CPT | Mod: HCNC,S$GLB,, | Performed by: INTERNAL MEDICINE

## 2020-11-13 PROCEDURE — 1159F PR MEDICATION LIST DOCUMENTED IN MEDICAL RECORD: ICD-10-PCS | Mod: HCNC,S$GLB,, | Performed by: INTERNAL MEDICINE

## 2020-11-13 PROCEDURE — 3288F PR FALLS RISK ASSESSMENT DOCUMENTED: ICD-10-PCS | Mod: HCNC,CPTII,S$GLB, | Performed by: INTERNAL MEDICINE

## 2020-11-13 RX ORDER — HYOSCYAMINE SULFATE 0.12 MG/1
0.12 TABLET SUBLINGUAL EVERY 12 HOURS PRN
Qty: 60 TABLET | Refills: 1 | Status: SHIPPED | OUTPATIENT
Start: 2020-11-13 | End: 2022-04-06 | Stop reason: ALTCHOICE

## 2020-11-13 NOTE — TELEPHONE ENCOUNTER
MA scheduled pt for labs , ct and a recall letter was sent for 8 week f/u no appt available on 1/8 . Pt was also was given kit for UA .               ----- Message from Herminio Franco MD sent at 11/13/2020  8:25 AM CST -----  Let us do a clean-catchMid stream UA and urine cultureThe reason why is she has had an abnormal urine culture recently was never followed up even know somebody ordered a repeat.Please center for labs todayCT scan abdomen pelvisReturn GI clinic in 8 weeks

## 2020-11-13 NOTE — Clinical Note
Let us do a clean-catchMid stream UA and urine culture  The reason why is she has had an abnormal urine culture recently was never followed up even know somebody ordered a repeat.    Please center for labs today  CT scan abdomen pelvis  Return GI clinic in 8 weeks

## 2020-11-13 NOTE — PROGRESS NOTES
[facility] Consultation Note    Reason for Consult:  The primary encounter diagnosis was Serrated polyp of colon. Diagnoses of Hydronephrosis, unspecified hydronephrosis type, Encounter for monitoring long-term proton pump inhibitor therapy, LLQ pain, and History of UTI were also pertinent to this visit.    PCP:   Michelle Bravo       Referring MD:  No referring provider defined for this encounter.    Initial History of Present Illness (HPI):  This is a 70 y.o. female here for evaluation of left lower quadrant pain, sessile serrated colon polyps, inability to gain weight, long-term PPI use.  Patient states last year in 2019 she had an upper endoscopy by Dr. Kasia Velarde.  She says it was normal no Robles's no H pylori.  Patient is having left lower quadrant pain she had a noncontrast to CT scan for renal stone protocol bilateral hydronephrosis followed by Urology.  There was questionable fluid filled appendix but not having right lower quadrant pain radiology recommend clinical correlation.  Patient's left lower quadrant pain is intermittent comes and goes nonspecific she denies any dysuria urgency frequency but in June of 2020 she had a UA that was positive for greater than 100,000 E coli colony forming units she was treated with Nitrofurantoin b.i.d. for 7 days.  It appears that they ordered follow-up UA and urine culture but she never did that.  We did tell patient on long-term PPI you she I have a creatinine check once yearly which she has she needs a vitamin B12 vitamin-D magnesium level checked as well.  It is uncertain if she has immunity to hepatitis A or B.  She knows her next surveillance colonoscopy should be in 5 years.  She is not interested in another EGD.  No family history of colon cancer no family history of advanced colon polyps no family history of esophageal or stomach cancer no family history of pancreatic or gallbladder or liver cancer no family  history of small-bowel cancer no family history of FAP attenuated FAP M APR Armijo syndrome.  She still has her ovaries in her uterus she says.  She denies constipation says she has a bowel movement once a day no blood in her stool no glaucoma.  She says her primary care doctor wrote her prescription for Levsin but she never took it she is willing to try it on a p.r.n. sublingual basis.  We did tell her has anticholinergic effect and could make her mouth try a little bit of blurred vision maybe make her constipated so she needs to be on the lookout for symptoms like that and she should stop her medicine if becomes troublesome.  She had a recent TSH that was normal.    Abdominal pain - as above  Reflux -occasional treated with p.r.n. Nexium  Dysphagia - no   Bowel habits - normal.  She denies constipation  GI bleeding - none  NSAID usage - none    Interval HPI 11/13/2020:  The patient's last visit with me was on 7/22/2020.      ROS:  Constitutional: No fevers, chills, No weight loss, just inability to gain weight.  She says she has always been thin.  ENT:  No heartburn no dysphagia no odynophagia no hoarseness  CV: No chest pain, no palpitation  Pulm: No cough, No shortness of breath, no wheezing  Ophtho: No vision changes  GI: see HPI  Derm: No rash, no itching  Heme: No lymphadenopathy, No easy bruising  MSK: No significant arthritis  : No dysuria, No hematuria  Endo: No hot or cold intolerance  Neuro: No syncope, No seizure, no strokes  Psych: No uncontrolled anxiety, No uncontrolled depression    Medical History:  has a past medical history of Arthritis, Multiple thyroid nodules, Osteoporosis, Reflux, and 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    Surgical History:  has a past surgical history that includes Colonoscopy (N/A, 9/14/2020).    Family History: family history includes Cancer in her father; Cataracts in her mother..     Social History:  reports that she has never smoked. She has never used  "smokeless tobacco. She reports that she does not drink alcohol or use drugs.    Review of patient's allergies indicates:   Allergen Reactions    Ciprofloxacin      palpitations    Flagyl [metronidazole hcl]      Cause urinary frequency       Medication List with Changes/Refills   New Medications    HYOSCYAMINE (LEVSIN/SL) 0.125 MG SUBL    Place 1 tablet (0.125 mg total) under the tongue every 12 (twelve) hours as needed (Abdominal cramp).   Current Medications    ALENDRONATE (FOSAMAX) 70 MG TABLET    TAKE 1 TAB (70 MG TOTAL) ORALLY EVERY 7 DAYS    ESOMEPRAZOLE (NEXIUM) 40 MG CAPSULE    TAKE ONE CAPSULE BY MOUTH EVERY DAY BEFORE BREAKFAST    ESTRADIOL (ESTRACE) 0.01 % (0.1 MG/GRAM) VAGINAL CREAM    Place 0.5 g vaginally twice a week.    MULTIVITAMIN (THERAGRAN) PER TABLET    Take 1 tablet by mouth Daily.    ONDANSETRON (ZOFRAN-ODT) 4 MG TBDL    Take 1 tablet (4 mg total) by mouth every 6 (six) hours as needed.   Discontinued Medications    HYOSCYAMINE (ANASPAZ,LEVSIN) 0.125 MG TAB    Take 1 tablet (125 mcg total) by mouth every 4 (four) hours as needed.         Objective Findings:    Vital Signs:  /75   Pulse 85   Ht 5' 4" (1.626 m)   Wt 43.3 kg (95 lb 7.4 oz)   LMP 08/29/1998   BMI 16.39 kg/m²   Body mass index is 16.39 kg/m².    Physical Exam:  General Appearance: Well appearing in no acute distress  Eyes:    No scleral icterus  ENT:  No lesions or masses   Lungs: CTA bilaterally, no wheezes, no rhonchi, no rales  Heart:  S1, S2 normal, no murmurs heard  Abdomen:  Non distended, soft, no guarding, no rebound, left lower quadrant tenderness, no appreciated ascites, no bruits, no hepatosplenomegaly,  No CVA tenderness, no appreciated hernias  Musculoskeletal:  No major joint deformities  Skin: No petechiae or rash on exposed skin areas  Neurologic:  Alert and oriented x4  Psychiatric:  Normal speech mentation and affect    Labs:  Lab Results   Component Value Date    WBC 4.10 09/30/2020    HGB 13.9 " 09/30/2020    HCT 46.2 09/30/2020     09/30/2020    CHOL 255 (H) 09/30/2020    TRIG 110 09/30/2020     (H) 09/30/2020    ALT 17 09/30/2020    AST 24 09/30/2020     09/30/2020    K 4.6 09/30/2020     09/30/2020    CREATININE 0.8 09/30/2020    BUN 14 09/30/2020    CO2 28 09/30/2020    TSH 2.758 09/30/2020    PSA <0.01 09/24/2014    INR 1.0 01/17/2004    HGBA1C 5.6 11/17/2015             Medical Decision Making:  Colonoscopy images personally reviewed by myself  Prior CT scan of the abdomen pelvis personally reviewed by myself  Pathology reviewed labs reviewed  Clean-catch mid stream urine with urine culture explained to patient  Lab work PPI labs explained to patient  CT scan abdomen pelvis with contrast for follow-up of prior abnormal CT scan  And for evaluation of left lower quadrant pain patient knows to stay well hydrated before and after CT scan  Because will be both IV and oral contrast to.  Colon, cecum, polyps x2 (3 mm and 7 mm), biopsy:  - Sessile serrated polyp, negative for cytologic dysplasia, multiple fragments.  Total time with patient reviewing charts looking at prior studies  Examining patient taking history doing physical exam explaining clean-catch mid stream urine  Lab work and CT scan 43 min with greater 50% of the time with patient education and direct contact    Assessment:  1. Serrated polyp of colon    2. Hydronephrosis, unspecified hydronephrosis type    3. Encounter for monitoring long-term proton pump inhibitor therapy    4. LLQ pain    5. History of UTI         Recommendations:  1.  Left lower quadrant pain with abnormal prior CT scan noncontrast will repeat labs today  Will schedule CT scan abdomen pelvis with oral and IV contrast for further evaluation patient knows to stay well hydrated   Prior to and after CT scan.  Trial of Levsin sublingual q.12 hours p.r.n. for abdominal cramps  2.  History of E coli UTI and bilateral hydronephrosis will recheck clean-catch  mid stream UA and urine culture to make sure  Her UTI has completely resolved in light of her symptoms.  3.  Long-term PPI use recommend vitamin B12 vitamin-D magnesium yearly creatinine will check these labs at iron up-to-date.  4.  Will check immunity to hepatitis A and B if not immune will vaccinate  5.  Return to GI clinic in 8 weeks for follow-up    Follow up in about 8 weeks (around 1/8/2021).      Order summary:  Orders Placed This Encounter    Urine culture    CT Abdomen Pelvis W Wo Contrast    TISSUE TRANSGLUTAMINASE (TTG), IGA    IgA    Hepatitis C Antibody    Hepatitis B Surface Antibody, Qual/Quant    Hepatitis B Surface Antigen    HEPATITIS A ANTIBODY, IGG    Lipase    Hepatic Function Panel    Basic Metabolic Panel    CBC Auto Differential    Vitamin B12    Vitamin D    Magnesium    CORTISOL, 8AM    Urinalysis    hyoscyamine (LEVSIN/SL) 0.125 mg Subl         Thank you so much for allowing me to participate in the care of Marshall Cami Franco MD

## 2020-11-18 ENCOUNTER — LAB VISIT (OUTPATIENT)
Dept: LAB | Facility: HOSPITAL | Age: 70
End: 2020-11-18
Attending: INTERNAL MEDICINE
Payer: MEDICARE

## 2020-11-18 DIAGNOSIS — N13.30 HYDRONEPHROSIS, UNSPECIFIED HYDRONEPHROSIS TYPE: ICD-10-CM

## 2020-11-18 DIAGNOSIS — R10.32 LLQ PAIN: ICD-10-CM

## 2020-11-18 DIAGNOSIS — Z79.899 ENCOUNTER FOR MONITORING LONG-TERM PROTON PUMP INHIBITOR THERAPY: ICD-10-CM

## 2020-11-18 DIAGNOSIS — K63.5 SERRATED POLYP OF COLON: ICD-10-CM

## 2020-11-18 DIAGNOSIS — Z51.81 ENCOUNTER FOR MONITORING LONG-TERM PROTON PUMP INHIBITOR THERAPY: ICD-10-CM

## 2020-11-18 LAB
25(OH)D3+25(OH)D2 SERPL-MCNC: 60 NG/ML (ref 30–96)
ALBUMIN SERPL BCP-MCNC: 4.4 G/DL (ref 3.5–5.2)
ALP SERPL-CCNC: 57 U/L (ref 55–135)
ALT SERPL W/O P-5'-P-CCNC: 18 U/L (ref 10–44)
ANION GAP SERPL CALC-SCNC: 8 MMOL/L (ref 8–16)
AST SERPL-CCNC: 25 U/L (ref 10–40)
BASOPHILS # BLD AUTO: 0.02 K/UL (ref 0–0.2)
BASOPHILS NFR BLD: 0.4 % (ref 0–1.9)
BILIRUB DIRECT SERPL-MCNC: 0.3 MG/DL (ref 0.1–0.3)
BILIRUB SERPL-MCNC: 0.9 MG/DL (ref 0.1–1)
BUN SERPL-MCNC: 13 MG/DL (ref 8–23)
CALCIUM SERPL-MCNC: 10 MG/DL (ref 8.7–10.5)
CHLORIDE SERPL-SCNC: 100 MMOL/L (ref 95–110)
CO2 SERPL-SCNC: 32 MMOL/L (ref 23–29)
CORTIS SERPL-MCNC: 14.3 UG/DL (ref 4.3–22.4)
CREAT SERPL-MCNC: 0.8 MG/DL (ref 0.5–1.4)
DIFFERENTIAL METHOD: ABNORMAL
EOSINOPHIL # BLD AUTO: 0 K/UL (ref 0–0.5)
EOSINOPHIL NFR BLD: 0 % (ref 0–8)
ERYTHROCYTE [DISTWIDTH] IN BLOOD BY AUTOMATED COUNT: 13.3 % (ref 11.5–14.5)
EST. GFR  (AFRICAN AMERICAN): >60 ML/MIN/1.73 M^2
EST. GFR  (NON AFRICAN AMERICAN): >60 ML/MIN/1.73 M^2
GLUCOSE SERPL-MCNC: 134 MG/DL (ref 70–110)
HCT VFR BLD AUTO: 46.6 % (ref 37–48.5)
HGB BLD-MCNC: 14.9 G/DL (ref 12–16)
IGA SERPL-MCNC: 160 MG/DL (ref 40–350)
IMM GRANULOCYTES # BLD AUTO: 0 K/UL (ref 0–0.04)
IMM GRANULOCYTES NFR BLD AUTO: 0 % (ref 0–0.5)
LIPASE SERPL-CCNC: 23 U/L (ref 4–60)
LYMPHOCYTES # BLD AUTO: 1.3 K/UL (ref 1–4.8)
LYMPHOCYTES NFR BLD: 28.4 % (ref 18–48)
MAGNESIUM SERPL-MCNC: 2.2 MG/DL (ref 1.6–2.6)
MCH RBC QN AUTO: 31.7 PG (ref 27–31)
MCHC RBC AUTO-ENTMCNC: 32 G/DL (ref 32–36)
MCV RBC AUTO: 99 FL (ref 82–98)
MONOCYTES # BLD AUTO: 0.2 K/UL (ref 0.3–1)
MONOCYTES NFR BLD: 5.3 % (ref 4–15)
NEUTROPHILS # BLD AUTO: 3 K/UL (ref 1.8–7.7)
NEUTROPHILS NFR BLD: 65.9 % (ref 38–73)
NRBC BLD-RTO: 0 /100 WBC
PLATELET # BLD AUTO: 283 K/UL (ref 150–350)
PMV BLD AUTO: 10.2 FL (ref 9.2–12.9)
POTASSIUM SERPL-SCNC: 4.8 MMOL/L (ref 3.5–5.1)
PROT SERPL-MCNC: 7.8 G/DL (ref 6–8.4)
RBC # BLD AUTO: 4.7 M/UL (ref 4–5.4)
SODIUM SERPL-SCNC: 140 MMOL/L (ref 136–145)
VIT B12 SERPL-MCNC: 1047 PG/ML (ref 210–950)
WBC # BLD AUTO: 4.57 K/UL (ref 3.9–12.7)

## 2020-11-18 PROCEDURE — 86790 VIRUS ANTIBODY NOS: CPT | Mod: HCNC

## 2020-11-18 PROCEDURE — 82607 VITAMIN B-12: CPT | Mod: HCNC

## 2020-11-18 PROCEDURE — 83516 IMMUNOASSAY NONANTIBODY: CPT | Mod: HCNC

## 2020-11-18 PROCEDURE — 80048 BASIC METABOLIC PNL TOTAL CA: CPT | Mod: HCNC

## 2020-11-18 PROCEDURE — 83735 ASSAY OF MAGNESIUM: CPT | Mod: HCNC

## 2020-11-18 PROCEDURE — 82533 TOTAL CORTISOL: CPT | Mod: HCNC

## 2020-11-18 PROCEDURE — 82784 ASSAY IGA/IGD/IGG/IGM EACH: CPT | Mod: HCNC

## 2020-11-18 PROCEDURE — 80076 HEPATIC FUNCTION PANEL: CPT | Mod: HCNC

## 2020-11-18 PROCEDURE — 85025 COMPLETE CBC W/AUTO DIFF WBC: CPT | Mod: HCNC

## 2020-11-18 PROCEDURE — 36415 COLL VENOUS BLD VENIPUNCTURE: CPT | Mod: HCNC,PO

## 2020-11-18 PROCEDURE — 83690 ASSAY OF LIPASE: CPT | Mod: HCNC

## 2020-11-18 PROCEDURE — 86706 HEP B SURFACE ANTIBODY: CPT | Mod: HCNC

## 2020-11-18 PROCEDURE — 82306 VITAMIN D 25 HYDROXY: CPT | Mod: HCNC

## 2020-11-18 PROCEDURE — 87340 HEPATITIS B SURFACE AG IA: CPT | Mod: HCNC

## 2020-11-18 PROCEDURE — 86803 HEPATITIS C AB TEST: CPT | Mod: HCNC

## 2020-11-19 LAB
HBV SURFACE AG SERPL QL IA: NEGATIVE
HCV AB SERPL QL IA: NEGATIVE
HEPATITIS A ANTIBODY, IGG: POSITIVE

## 2020-11-20 ENCOUNTER — HOSPITAL ENCOUNTER (OUTPATIENT)
Dept: RADIOLOGY | Facility: HOSPITAL | Age: 70
Discharge: HOME | End: 2020-11-20
Attending: INTERNAL MEDICINE
Payer: MEDICARE

## 2020-11-20 DIAGNOSIS — N13.30 HYDRONEPHROSIS, UNSPECIFIED HYDRONEPHROSIS TYPE: ICD-10-CM

## 2020-11-20 DIAGNOSIS — R10.32 LLQ PAIN: ICD-10-CM

## 2020-11-20 DIAGNOSIS — Z79.899 ENCOUNTER FOR MONITORING LONG-TERM PROTON PUMP INHIBITOR THERAPY: ICD-10-CM

## 2020-11-20 DIAGNOSIS — K63.5 SERRATED POLYP OF COLON: ICD-10-CM

## 2020-11-20 DIAGNOSIS — Z51.81 ENCOUNTER FOR MONITORING LONG-TERM PROTON PUMP INHIBITOR THERAPY: ICD-10-CM

## 2020-11-20 LAB
HBV SURFACE AB SER QL IA: NEGATIVE
HBV SURFACE AB SERPL IA-ACNC: <3 MIU/ML

## 2020-11-20 PROCEDURE — 25500020 PHARM REV CODE 255: Mod: HCNC | Performed by: INTERNAL MEDICINE

## 2020-11-20 PROCEDURE — 74177 CT ABD & PELVIS W/CONTRAST: CPT | Mod: 26,HCNC,, | Performed by: RADIOLOGY

## 2020-11-20 PROCEDURE — 74177 CT ABDOMEN PELVIS WITH CONTRAST: ICD-10-PCS | Mod: 26,HCNC,, | Performed by: RADIOLOGY

## 2020-11-20 PROCEDURE — 74177 CT ABD & PELVIS W/CONTRAST: CPT | Mod: TC,HCNC

## 2020-11-20 RX ADMIN — IOHEXOL 15 ML: 350 INJECTION, SOLUTION INTRAVENOUS at 12:11

## 2020-11-20 RX ADMIN — IOHEXOL 15 ML: 350 INJECTION, SOLUTION INTRAVENOUS at 11:11

## 2020-11-20 RX ADMIN — IOHEXOL 75 ML: 350 INJECTION, SOLUTION INTRAVENOUS at 01:11

## 2020-11-21 DIAGNOSIS — K63.5 SERRATED POLYP OF COLON: Primary | ICD-10-CM

## 2020-11-23 LAB — TTG IGA SER-ACNC: 4 UNITS

## 2020-11-24 ENCOUNTER — TELEPHONE (OUTPATIENT)
Dept: ENDOSCOPY | Facility: HOSPITAL | Age: 70
End: 2020-11-24

## 2020-11-24 NOTE — TELEPHONE ENCOUNTER
"----- Message from Herminio Franco MD sent at 11/21/2020 10:12 AM CST -----  Daniela  please tell patient that their Hepatitis A, B and C labs are negative but they have "No" immunity to hepatitis B and hepatitis C.    She does have immunity to hepatitis A which is good thing     There is currently No vaccination yet for Hepatitis C.    There is vaccinations for Hepatitis B,  and Recommend the Hepatitis B vaccination series.     Hepatitis B vaccination series is a 2 part vaccine series - Day 1, and then again in 1-month from the first one.      Orders were  placed.  "

## 2020-12-01 ENCOUNTER — TELEPHONE (OUTPATIENT)
Dept: ENDOSCOPY | Facility: HOSPITAL | Age: 70
End: 2020-12-01

## 2020-12-01 NOTE — TELEPHONE ENCOUNTER
----- Message from Herminio Franco MD sent at 11/29/2020 11:06 AM CST -----  Daniela please tell patient no cause for left lower quadrant pain found on her recent CT scan:    Impression:     Mildly dilated tubular structure within the right lower quadrant, measuring 1.1 cm, possibly reflecting the appendix noting no definite connection with the cecum can be visualized.  Nonetheless, it appears overall similar in size and configuration to prior CT 05/18/2020.  No significant inflammatory change.     Few nonspecific subcentimeter hepatic hypodensities, too small to characterize.    Electronically signed by: Sigifredo Pierson MD

## 2020-12-03 ENCOUNTER — TELEPHONE (OUTPATIENT)
Dept: PRIMARY CARE CLINIC | Facility: CLINIC | Age: 70
End: 2020-12-03

## 2020-12-03 NOTE — TELEPHONE ENCOUNTER
Left VM advising pt that if she is still having pain she will need to schedule an appointment and be seen.

## 2020-12-03 NOTE — TELEPHONE ENCOUNTER
----- Message from Michelle Bravo MD sent at 12/3/2020  3:15 PM CST -----   Please call pt an tell her if she is still having  The abdominal pain to make an appt asap.      This message was sent to me.  MD Michelle Melendez MD; YOVANNY Bey please tell patient no cause for left lower quadrant pain found on her recent CT scan:     Impression:       Mildly dilated tubular structure within the right lower quadrant, measuring 1.1 cm, possibly reflecting the appendix noting no definite connection with the cecum can be visualized.  Nonetheless, it appears overall similar in size and configuration to prior CT 05/18/2020.  No significant inflammatory change.       Few nonspecific subcentimeter hepatic hypodensities, too small to characterize.     Electronically signed by: Sigifredo Pierson MD

## 2021-02-26 ENCOUNTER — OFFICE VISIT (OUTPATIENT)
Dept: INTERNAL MEDICINE | Facility: CLINIC | Age: 71
End: 2021-02-26
Payer: MEDICARE

## 2021-02-26 VITALS
SYSTOLIC BLOOD PRESSURE: 124 MMHG | OXYGEN SATURATION: 98 % | HEART RATE: 87 BPM | TEMPERATURE: 97 F | WEIGHT: 95.44 LBS | DIASTOLIC BLOOD PRESSURE: 68 MMHG | BODY MASS INDEX: 16.29 KG/M2 | HEIGHT: 64 IN | RESPIRATION RATE: 16 BRPM

## 2021-02-26 DIAGNOSIS — K12.0 CANKER SORE: Primary | ICD-10-CM

## 2021-02-26 PROCEDURE — 1101F PR PT FALLS ASSESS DOC 0-1 FALLS W/OUT INJ PAST YR: ICD-10-PCS | Mod: CPTII,S$GLB,, | Performed by: NURSE PRACTITIONER

## 2021-02-26 PROCEDURE — 3288F FALL RISK ASSESSMENT DOCD: CPT | Mod: CPTII,S$GLB,, | Performed by: NURSE PRACTITIONER

## 2021-02-26 PROCEDURE — 3008F PR BODY MASS INDEX (BMI) DOCUMENTED: ICD-10-PCS | Mod: CPTII,S$GLB,, | Performed by: NURSE PRACTITIONER

## 2021-02-26 PROCEDURE — 99213 OFFICE O/P EST LOW 20 MIN: CPT | Mod: S$GLB,,, | Performed by: NURSE PRACTITIONER

## 2021-02-26 PROCEDURE — 1101F PT FALLS ASSESS-DOCD LE1/YR: CPT | Mod: CPTII,S$GLB,, | Performed by: NURSE PRACTITIONER

## 2021-02-26 PROCEDURE — 3288F PR FALLS RISK ASSESSMENT DOCUMENTED: ICD-10-PCS | Mod: CPTII,S$GLB,, | Performed by: NURSE PRACTITIONER

## 2021-02-26 PROCEDURE — 1125F AMNT PAIN NOTED PAIN PRSNT: CPT | Mod: S$GLB,,, | Performed by: NURSE PRACTITIONER

## 2021-02-26 PROCEDURE — 99213 PR OFFICE/OUTPT VISIT, EST, LEVL III, 20-29 MIN: ICD-10-PCS | Mod: S$GLB,,, | Performed by: NURSE PRACTITIONER

## 2021-02-26 PROCEDURE — 99999 PR PBB SHADOW E&M-EST. PATIENT-LVL III: CPT | Mod: PBBFAC,,, | Performed by: NURSE PRACTITIONER

## 2021-02-26 PROCEDURE — 1125F PR PAIN SEVERITY QUANTIFIED, PAIN PRESENT: ICD-10-PCS | Mod: S$GLB,,, | Performed by: NURSE PRACTITIONER

## 2021-02-26 PROCEDURE — 99999 PR PBB SHADOW E&M-EST. PATIENT-LVL III: ICD-10-PCS | Mod: PBBFAC,,, | Performed by: NURSE PRACTITIONER

## 2021-02-26 PROCEDURE — 3008F BODY MASS INDEX DOCD: CPT | Mod: CPTII,S$GLB,, | Performed by: NURSE PRACTITIONER

## 2021-02-26 RX ORDER — TRIAMCINOLONE ACETONIDE 1 MG/G
PASTE DENTAL 2 TIMES DAILY
Qty: 5 G | Refills: 1 | Status: SHIPPED | OUTPATIENT
Start: 2021-02-26 | End: 2021-03-05

## 2021-04-07 ENCOUNTER — LAB VISIT (OUTPATIENT)
Dept: LAB | Facility: HOSPITAL | Age: 71
End: 2021-04-07
Attending: FAMILY MEDICINE
Payer: MEDICARE

## 2021-04-07 DIAGNOSIS — E78.5 HYPERLIPIDEMIA, UNSPECIFIED HYPERLIPIDEMIA TYPE: ICD-10-CM

## 2021-04-07 PROCEDURE — 80053 COMPREHEN METABOLIC PANEL: CPT | Performed by: FAMILY MEDICINE

## 2021-04-07 PROCEDURE — 36415 COLL VENOUS BLD VENIPUNCTURE: CPT | Mod: PN | Performed by: FAMILY MEDICINE

## 2021-04-07 PROCEDURE — 80061 LIPID PANEL: CPT | Performed by: FAMILY MEDICINE

## 2021-04-08 LAB
ALBUMIN SERPL BCP-MCNC: 4.1 G/DL (ref 3.5–5.2)
ALP SERPL-CCNC: 60 U/L (ref 55–135)
ALT SERPL W/O P-5'-P-CCNC: 15 U/L (ref 10–44)
ANION GAP SERPL CALC-SCNC: 8 MMOL/L (ref 8–16)
AST SERPL-CCNC: 22 U/L (ref 10–40)
BILIRUB SERPL-MCNC: 0.7 MG/DL (ref 0.1–1)
BUN SERPL-MCNC: 15 MG/DL (ref 8–23)
CALCIUM SERPL-MCNC: 9.3 MG/DL (ref 8.7–10.5)
CHLORIDE SERPL-SCNC: 102 MMOL/L (ref 95–110)
CHOLEST SERPL-MCNC: 228 MG/DL (ref 120–199)
CHOLEST/HDLC SERPL: 2.4 {RATIO} (ref 2–5)
CO2 SERPL-SCNC: 30 MMOL/L (ref 23–29)
CREAT SERPL-MCNC: 0.8 MG/DL (ref 0.5–1.4)
EST. GFR  (AFRICAN AMERICAN): >60 ML/MIN/1.73 M^2
EST. GFR  (NON AFRICAN AMERICAN): >60 ML/MIN/1.73 M^2
GLUCOSE SERPL-MCNC: 103 MG/DL (ref 70–110)
HDLC SERPL-MCNC: 94 MG/DL (ref 40–75)
HDLC SERPL: 41.2 % (ref 20–50)
LDLC SERPL CALC-MCNC: 110.4 MG/DL (ref 63–159)
NONHDLC SERPL-MCNC: 134 MG/DL
POTASSIUM SERPL-SCNC: 4.6 MMOL/L (ref 3.5–5.1)
PROT SERPL-MCNC: 7.2 G/DL (ref 6–8.4)
SODIUM SERPL-SCNC: 140 MMOL/L (ref 136–145)
TRIGL SERPL-MCNC: 118 MG/DL (ref 30–150)

## 2021-04-22 ENCOUNTER — TELEPHONE (OUTPATIENT)
Dept: PRIMARY CARE CLINIC | Facility: CLINIC | Age: 71
End: 2021-04-22

## 2021-06-15 ENCOUNTER — PES CALL (OUTPATIENT)
Dept: ADMINISTRATIVE | Facility: CLINIC | Age: 71
End: 2021-06-15

## 2021-07-13 ENCOUNTER — TELEPHONE (OUTPATIENT)
Dept: PRIMARY CARE CLINIC | Facility: CLINIC | Age: 71
End: 2021-07-13

## 2021-07-13 DIAGNOSIS — E78.5 HYPERLIPIDEMIA, UNSPECIFIED HYPERLIPIDEMIA TYPE: ICD-10-CM

## 2021-07-13 DIAGNOSIS — Z00.00 ROUTINE GENERAL MEDICAL EXAMINATION AT A HEALTH CARE FACILITY: Primary | ICD-10-CM

## 2021-08-25 ENCOUNTER — PES CALL (OUTPATIENT)
Dept: ADMINISTRATIVE | Facility: CLINIC | Age: 71
End: 2021-08-25

## 2021-10-14 ENCOUNTER — OFFICE VISIT (OUTPATIENT)
Dept: INTERNAL MEDICINE | Facility: CLINIC | Age: 71
End: 2021-10-14
Payer: MEDICARE

## 2021-10-14 VITALS
WEIGHT: 89.75 LBS | SYSTOLIC BLOOD PRESSURE: 119 MMHG | HEART RATE: 89 BPM | BODY MASS INDEX: 17.62 KG/M2 | DIASTOLIC BLOOD PRESSURE: 79 MMHG | OXYGEN SATURATION: 96 % | TEMPERATURE: 97 F | HEIGHT: 60 IN

## 2021-10-14 DIAGNOSIS — S16.1XXA STRAIN OF NECK MUSCLE, INITIAL ENCOUNTER: Primary | ICD-10-CM

## 2021-10-14 PROCEDURE — 99999 PR PBB SHADOW E&M-EST. PATIENT-LVL IV: CPT | Mod: PBBFAC,HCNC,, | Performed by: FAMILY MEDICINE

## 2021-10-14 PROCEDURE — 3008F PR BODY MASS INDEX (BMI) DOCUMENTED: ICD-10-PCS | Mod: HCNC,CPTII,S$GLB, | Performed by: FAMILY MEDICINE

## 2021-10-14 PROCEDURE — 1159F PR MEDICATION LIST DOCUMENTED IN MEDICAL RECORD: ICD-10-PCS | Mod: HCNC,CPTII,S$GLB, | Performed by: FAMILY MEDICINE

## 2021-10-14 PROCEDURE — 99214 OFFICE O/P EST MOD 30 MIN: CPT | Mod: HCNC,S$GLB,, | Performed by: FAMILY MEDICINE

## 2021-10-14 PROCEDURE — 3078F PR MOST RECENT DIASTOLIC BLOOD PRESSURE < 80 MM HG: ICD-10-PCS | Mod: HCNC,CPTII,S$GLB, | Performed by: FAMILY MEDICINE

## 2021-10-14 PROCEDURE — 99214 PR OFFICE/OUTPT VISIT, EST, LEVL IV, 30-39 MIN: ICD-10-PCS | Mod: HCNC,S$GLB,, | Performed by: FAMILY MEDICINE

## 2021-10-14 PROCEDURE — 3288F PR FALLS RISK ASSESSMENT DOCUMENTED: ICD-10-PCS | Mod: HCNC,CPTII,S$GLB, | Performed by: FAMILY MEDICINE

## 2021-10-14 PROCEDURE — 1125F AMNT PAIN NOTED PAIN PRSNT: CPT | Mod: HCNC,CPTII,S$GLB, | Performed by: FAMILY MEDICINE

## 2021-10-14 PROCEDURE — 99999 PR PBB SHADOW E&M-EST. PATIENT-LVL IV: ICD-10-PCS | Mod: PBBFAC,HCNC,, | Performed by: FAMILY MEDICINE

## 2021-10-14 PROCEDURE — 3074F SYST BP LT 130 MM HG: CPT | Mod: HCNC,CPTII,S$GLB, | Performed by: FAMILY MEDICINE

## 2021-10-14 PROCEDURE — 1159F MED LIST DOCD IN RCRD: CPT | Mod: HCNC,CPTII,S$GLB, | Performed by: FAMILY MEDICINE

## 2021-10-14 PROCEDURE — 3074F PR MOST RECENT SYSTOLIC BLOOD PRESSURE < 130 MM HG: ICD-10-PCS | Mod: HCNC,CPTII,S$GLB, | Performed by: FAMILY MEDICINE

## 2021-10-14 PROCEDURE — 3078F DIAST BP <80 MM HG: CPT | Mod: HCNC,CPTII,S$GLB, | Performed by: FAMILY MEDICINE

## 2021-10-14 PROCEDURE — 1101F PT FALLS ASSESS-DOCD LE1/YR: CPT | Mod: HCNC,CPTII,S$GLB, | Performed by: FAMILY MEDICINE

## 2021-10-14 PROCEDURE — 3288F FALL RISK ASSESSMENT DOCD: CPT | Mod: HCNC,CPTII,S$GLB, | Performed by: FAMILY MEDICINE

## 2021-10-14 PROCEDURE — 1101F PR PT FALLS ASSESS DOC 0-1 FALLS W/OUT INJ PAST YR: ICD-10-PCS | Mod: HCNC,CPTII,S$GLB, | Performed by: FAMILY MEDICINE

## 2021-10-14 PROCEDURE — 1160F RVW MEDS BY RX/DR IN RCRD: CPT | Mod: HCNC,CPTII,S$GLB, | Performed by: FAMILY MEDICINE

## 2021-10-14 PROCEDURE — 1160F PR REVIEW ALL MEDS BY PRESCRIBER/CLIN PHARMACIST DOCUMENTED: ICD-10-PCS | Mod: HCNC,CPTII,S$GLB, | Performed by: FAMILY MEDICINE

## 2021-10-14 PROCEDURE — 3008F BODY MASS INDEX DOCD: CPT | Mod: HCNC,CPTII,S$GLB, | Performed by: FAMILY MEDICINE

## 2021-10-14 PROCEDURE — 1125F PR PAIN SEVERITY QUANTIFIED, PAIN PRESENT: ICD-10-PCS | Mod: HCNC,CPTII,S$GLB, | Performed by: FAMILY MEDICINE

## 2021-10-14 RX ORDER — METHOCARBAMOL 750 MG/1
750 TABLET, FILM COATED ORAL 4 TIMES DAILY PRN
Qty: 40 TABLET | Refills: 0 | Status: SHIPPED | OUTPATIENT
Start: 2021-10-14 | End: 2021-10-24

## 2021-10-15 ENCOUNTER — LAB VISIT (OUTPATIENT)
Dept: LAB | Facility: HOSPITAL | Age: 71
End: 2021-10-15
Attending: FAMILY MEDICINE
Payer: MEDICARE

## 2021-10-15 DIAGNOSIS — E78.5 HYPERLIPIDEMIA, UNSPECIFIED HYPERLIPIDEMIA TYPE: ICD-10-CM

## 2021-10-15 DIAGNOSIS — Z00.00 ROUTINE GENERAL MEDICAL EXAMINATION AT A HEALTH CARE FACILITY: ICD-10-CM

## 2021-10-15 LAB
ALBUMIN SERPL BCP-MCNC: 4 G/DL (ref 3.5–5.2)
ALP SERPL-CCNC: 52 U/L (ref 55–135)
ALT SERPL W/O P-5'-P-CCNC: 19 U/L (ref 10–44)
ANION GAP SERPL CALC-SCNC: 11 MMOL/L (ref 8–16)
AST SERPL-CCNC: 26 U/L (ref 10–40)
BASOPHILS # BLD AUTO: 0.02 K/UL (ref 0–0.2)
BASOPHILS NFR BLD: 0.5 % (ref 0–1.9)
BILIRUB SERPL-MCNC: 0.7 MG/DL (ref 0.1–1)
BUN SERPL-MCNC: 16 MG/DL (ref 8–23)
CALCIUM SERPL-MCNC: 9.7 MG/DL (ref 8.7–10.5)
CHLORIDE SERPL-SCNC: 104 MMOL/L (ref 95–110)
CHOLEST SERPL-MCNC: 220 MG/DL (ref 120–199)
CHOLEST/HDLC SERPL: 2.4 {RATIO} (ref 2–5)
CO2 SERPL-SCNC: 26 MMOL/L (ref 23–29)
CREAT SERPL-MCNC: 0.8 MG/DL (ref 0.5–1.4)
DIFFERENTIAL METHOD: ABNORMAL
EOSINOPHIL # BLD AUTO: 0 K/UL (ref 0–0.5)
EOSINOPHIL NFR BLD: 0.2 % (ref 0–8)
ERYTHROCYTE [DISTWIDTH] IN BLOOD BY AUTOMATED COUNT: 13.3 % (ref 11.5–14.5)
EST. GFR  (AFRICAN AMERICAN): >60 ML/MIN/1.73 M^2
EST. GFR  (NON AFRICAN AMERICAN): >60 ML/MIN/1.73 M^2
GLUCOSE SERPL-MCNC: 97 MG/DL (ref 70–110)
HCT VFR BLD AUTO: 41.4 % (ref 37–48.5)
HDLC SERPL-MCNC: 93 MG/DL (ref 40–75)
HDLC SERPL: 42.3 % (ref 20–50)
HGB BLD-MCNC: 13.2 G/DL (ref 12–16)
IMM GRANULOCYTES # BLD AUTO: 0.01 K/UL (ref 0–0.04)
IMM GRANULOCYTES NFR BLD AUTO: 0.2 % (ref 0–0.5)
LDLC SERPL CALC-MCNC: 110 MG/DL (ref 63–159)
LYMPHOCYTES # BLD AUTO: 1.4 K/UL (ref 1–4.8)
LYMPHOCYTES NFR BLD: 33.5 % (ref 18–48)
MCH RBC QN AUTO: 30.4 PG (ref 27–31)
MCHC RBC AUTO-ENTMCNC: 31.9 G/DL (ref 32–36)
MCV RBC AUTO: 95 FL (ref 82–98)
MONOCYTES # BLD AUTO: 0.3 K/UL (ref 0.3–1)
MONOCYTES NFR BLD: 7.4 % (ref 4–15)
NEUTROPHILS # BLD AUTO: 2.3 K/UL (ref 1.8–7.7)
NEUTROPHILS NFR BLD: 58.2 % (ref 38–73)
NONHDLC SERPL-MCNC: 127 MG/DL
NRBC BLD-RTO: 0 /100 WBC
PLATELET # BLD AUTO: 234 K/UL (ref 150–450)
PMV BLD AUTO: 10.2 FL (ref 9.2–12.9)
POTASSIUM SERPL-SCNC: 4.6 MMOL/L (ref 3.5–5.1)
PROT SERPL-MCNC: 7.1 G/DL (ref 6–8.4)
RBC # BLD AUTO: 4.34 M/UL (ref 4–5.4)
SODIUM SERPL-SCNC: 141 MMOL/L (ref 136–145)
TRIGL SERPL-MCNC: 85 MG/DL (ref 30–150)
TSH SERPL DL<=0.005 MIU/L-ACNC: 1.83 UIU/ML (ref 0.4–4)
WBC # BLD AUTO: 4.03 K/UL (ref 3.9–12.7)

## 2021-10-15 PROCEDURE — 36415 COLL VENOUS BLD VENIPUNCTURE: CPT | Mod: HCNC,PO | Performed by: FAMILY MEDICINE

## 2021-10-15 PROCEDURE — 80053 COMPREHEN METABOLIC PANEL: CPT | Mod: HCNC | Performed by: FAMILY MEDICINE

## 2021-10-15 PROCEDURE — 85025 COMPLETE CBC W/AUTO DIFF WBC: CPT | Mod: HCNC | Performed by: FAMILY MEDICINE

## 2021-10-15 PROCEDURE — 84443 ASSAY THYROID STIM HORMONE: CPT | Mod: HCNC | Performed by: FAMILY MEDICINE

## 2021-10-15 PROCEDURE — 80061 LIPID PANEL: CPT | Mod: HCNC | Performed by: FAMILY MEDICINE

## 2021-10-20 ENCOUNTER — TELEPHONE (OUTPATIENT)
Dept: PRIMARY CARE CLINIC | Facility: CLINIC | Age: 71
End: 2021-10-20

## 2021-10-20 ENCOUNTER — HOSPITAL ENCOUNTER (OUTPATIENT)
Dept: RADIOLOGY | Facility: HOSPITAL | Age: 71
Discharge: HOME | End: 2021-10-20
Attending: FAMILY MEDICINE
Payer: MEDICARE

## 2021-10-20 ENCOUNTER — OFFICE VISIT (OUTPATIENT)
Dept: PRIMARY CARE CLINIC | Facility: CLINIC | Age: 71
End: 2021-10-20
Payer: MEDICARE

## 2021-10-20 VITALS
DIASTOLIC BLOOD PRESSURE: 72 MMHG | TEMPERATURE: 98 F | RESPIRATION RATE: 17 BRPM | BODY MASS INDEX: 17.66 KG/M2 | HEART RATE: 93 BPM | OXYGEN SATURATION: 99 % | SYSTOLIC BLOOD PRESSURE: 110 MMHG | WEIGHT: 89.94 LBS | HEIGHT: 60 IN

## 2021-10-20 DIAGNOSIS — G89.29 CHRONIC PAIN OF BOTH SHOULDERS: ICD-10-CM

## 2021-10-20 DIAGNOSIS — N95.2 POSTMENOPAUSAL ATROPHIC VAGINITIS: ICD-10-CM

## 2021-10-20 DIAGNOSIS — M54.2 NECK PAIN: ICD-10-CM

## 2021-10-20 DIAGNOSIS — M25.512 CHRONIC PAIN OF BOTH SHOULDERS: ICD-10-CM

## 2021-10-20 DIAGNOSIS — Z12.31 SCREENING MAMMOGRAM FOR HIGH-RISK PATIENT: Primary | ICD-10-CM

## 2021-10-20 DIAGNOSIS — K21.9 GASTROESOPHAGEAL REFLUX DISEASE WITHOUT ESOPHAGITIS: ICD-10-CM

## 2021-10-20 DIAGNOSIS — R63.4 WEIGHT LOSS: ICD-10-CM

## 2021-10-20 DIAGNOSIS — E78.2 MIXED HYPERLIPIDEMIA: ICD-10-CM

## 2021-10-20 DIAGNOSIS — M25.511 CHRONIC PAIN OF BOTH SHOULDERS: ICD-10-CM

## 2021-10-20 DIAGNOSIS — M81.0 OSTEOPOROSIS, UNSPECIFIED OSTEOPOROSIS TYPE, UNSPECIFIED PATHOLOGICAL FRACTURE PRESENCE: ICD-10-CM

## 2021-10-20 DIAGNOSIS — M81.0 AGE-RELATED OSTEOPOROSIS WITHOUT CURRENT PATHOLOGICAL FRACTURE: ICD-10-CM

## 2021-10-20 PROCEDURE — 72040 X-RAY EXAM NECK SPINE 2-3 VW: CPT | Mod: TC,HCNC,PN

## 2021-10-20 PROCEDURE — 3288F FALL RISK ASSESSMENT DOCD: CPT | Mod: HCNC,CPTII,S$GLB, | Performed by: FAMILY MEDICINE

## 2021-10-20 PROCEDURE — 1160F RVW MEDS BY RX/DR IN RCRD: CPT | Mod: HCNC,CPTII,S$GLB, | Performed by: FAMILY MEDICINE

## 2021-10-20 PROCEDURE — 3078F DIAST BP <80 MM HG: CPT | Mod: HCNC,CPTII,S$GLB, | Performed by: FAMILY MEDICINE

## 2021-10-20 PROCEDURE — 3008F BODY MASS INDEX DOCD: CPT | Mod: HCNC,CPTII,S$GLB, | Performed by: FAMILY MEDICINE

## 2021-10-20 PROCEDURE — 1160F PR REVIEW ALL MEDS BY PRESCRIBER/CLIN PHARMACIST DOCUMENTED: ICD-10-PCS | Mod: HCNC,CPTII,S$GLB, | Performed by: FAMILY MEDICINE

## 2021-10-20 PROCEDURE — 1101F PT FALLS ASSESS-DOCD LE1/YR: CPT | Mod: HCNC,CPTII,S$GLB, | Performed by: FAMILY MEDICINE

## 2021-10-20 PROCEDURE — 1159F MED LIST DOCD IN RCRD: CPT | Mod: HCNC,CPTII,S$GLB, | Performed by: FAMILY MEDICINE

## 2021-10-20 PROCEDURE — 99999 PR PBB SHADOW E&M-EST. PATIENT-LVL V: ICD-10-PCS | Mod: PBBFAC,HCNC,, | Performed by: FAMILY MEDICINE

## 2021-10-20 PROCEDURE — 1125F AMNT PAIN NOTED PAIN PRSNT: CPT | Mod: HCNC,CPTII,S$GLB, | Performed by: FAMILY MEDICINE

## 2021-10-20 PROCEDURE — 1125F PR PAIN SEVERITY QUANTIFIED, PAIN PRESENT: ICD-10-PCS | Mod: HCNC,CPTII,S$GLB, | Performed by: FAMILY MEDICINE

## 2021-10-20 PROCEDURE — 99999 PR PBB SHADOW E&M-EST. PATIENT-LVL V: CPT | Mod: PBBFAC,HCNC,, | Performed by: FAMILY MEDICINE

## 2021-10-20 PROCEDURE — 3078F PR MOST RECENT DIASTOLIC BLOOD PRESSURE < 80 MM HG: ICD-10-PCS | Mod: HCNC,CPTII,S$GLB, | Performed by: FAMILY MEDICINE

## 2021-10-20 PROCEDURE — 99397 PER PM REEVAL EST PAT 65+ YR: CPT | Mod: HCNC,S$GLB,, | Performed by: FAMILY MEDICINE

## 2021-10-20 PROCEDURE — 3008F PR BODY MASS INDEX (BMI) DOCUMENTED: ICD-10-PCS | Mod: HCNC,CPTII,S$GLB, | Performed by: FAMILY MEDICINE

## 2021-10-20 PROCEDURE — 1101F PR PT FALLS ASSESS DOC 0-1 FALLS W/OUT INJ PAST YR: ICD-10-PCS | Mod: HCNC,CPTII,S$GLB, | Performed by: FAMILY MEDICINE

## 2021-10-20 PROCEDURE — 72040 XR CERVICAL SPINE AP LATERAL: ICD-10-PCS | Mod: 26,HCNC,, | Performed by: RADIOLOGY

## 2021-10-20 PROCEDURE — 3288F PR FALLS RISK ASSESSMENT DOCUMENTED: ICD-10-PCS | Mod: HCNC,CPTII,S$GLB, | Performed by: FAMILY MEDICINE

## 2021-10-20 PROCEDURE — 72040 X-RAY EXAM NECK SPINE 2-3 VW: CPT | Mod: 26,HCNC,, | Performed by: RADIOLOGY

## 2021-10-20 PROCEDURE — 3074F PR MOST RECENT SYSTOLIC BLOOD PRESSURE < 130 MM HG: ICD-10-PCS | Mod: HCNC,CPTII,S$GLB, | Performed by: FAMILY MEDICINE

## 2021-10-20 PROCEDURE — 99397 PR PREVENTIVE VISIT,EST,65 & OVER: ICD-10-PCS | Mod: HCNC,S$GLB,, | Performed by: FAMILY MEDICINE

## 2021-10-20 PROCEDURE — 3074F SYST BP LT 130 MM HG: CPT | Mod: HCNC,CPTII,S$GLB, | Performed by: FAMILY MEDICINE

## 2021-10-20 PROCEDURE — 1159F PR MEDICATION LIST DOCUMENTED IN MEDICAL RECORD: ICD-10-PCS | Mod: HCNC,CPTII,S$GLB, | Performed by: FAMILY MEDICINE

## 2021-10-20 RX ORDER — ESOMEPRAZOLE MAGNESIUM 40 MG/1
CAPSULE, DELAYED RELEASE ORAL
Qty: 90 CAPSULE | Refills: 3 | Status: SHIPPED | OUTPATIENT
Start: 2021-10-20 | End: 2022-04-06

## 2021-10-20 RX ORDER — ALENDRONATE SODIUM 70 MG/1
TABLET ORAL
Qty: 12 TABLET | Refills: 4 | Status: SHIPPED | OUTPATIENT
Start: 2021-10-20 | End: 2023-03-06

## 2021-10-20 RX ORDER — ROSUVASTATIN CALCIUM 5 MG/1
5 TABLET, COATED ORAL DAILY
Qty: 90 TABLET | Refills: 3 | Status: SHIPPED | OUTPATIENT
Start: 2021-10-20 | End: 2022-04-06 | Stop reason: ALTCHOICE

## 2021-10-20 RX ORDER — ESTRADIOL 0.1 MG/G
0.5 CREAM VAGINAL
Qty: 42.5 G | Refills: 1 | Status: SHIPPED | OUTPATIENT
Start: 2021-10-21 | End: 2022-04-06 | Stop reason: ALTCHOICE

## 2021-10-20 RX ORDER — MIRTAZAPINE 15 MG/1
15 TABLET, ORALLY DISINTEGRATING ORAL NIGHTLY
Qty: 30 TABLET | Refills: 11 | Status: SHIPPED | OUTPATIENT
Start: 2021-10-20 | End: 2022-04-06 | Stop reason: ALTCHOICE

## 2021-10-21 ENCOUNTER — HOSPITAL ENCOUNTER (OUTPATIENT)
Dept: RADIOLOGY | Facility: HOSPITAL | Age: 71
Discharge: HOME | End: 2021-10-21
Attending: FAMILY MEDICINE
Payer: MEDICARE

## 2021-10-21 ENCOUNTER — TELEPHONE (OUTPATIENT)
Dept: PRIMARY CARE CLINIC | Facility: CLINIC | Age: 71
End: 2021-10-21

## 2021-10-21 VITALS — BODY MASS INDEX: 15.36 KG/M2 | WEIGHT: 90 LBS | HEIGHT: 64 IN

## 2021-10-21 DIAGNOSIS — Z12.31 SCREENING MAMMOGRAM FOR HIGH-RISK PATIENT: ICD-10-CM

## 2021-10-21 DIAGNOSIS — M25.512 CHRONIC PAIN OF BOTH SHOULDERS: ICD-10-CM

## 2021-10-21 DIAGNOSIS — G89.29 CHRONIC PAIN OF BOTH SHOULDERS: ICD-10-CM

## 2021-10-21 DIAGNOSIS — M25.511 CHRONIC PAIN OF BOTH SHOULDERS: ICD-10-CM

## 2021-10-21 DIAGNOSIS — E78.2 MIXED HYPERLIPIDEMIA: Primary | ICD-10-CM

## 2021-10-21 PROCEDURE — 77067 MAMMO DIGITAL SCREENING BILAT WITH TOMO: ICD-10-PCS | Mod: 26,HCNC,, | Performed by: RADIOLOGY

## 2021-10-21 PROCEDURE — 77067 SCR MAMMO BI INCL CAD: CPT | Mod: TC,HCNC,PN

## 2021-10-21 PROCEDURE — 77063 MAMMO DIGITAL SCREENING BILAT WITH TOMO: ICD-10-PCS | Mod: 26,HCNC,, | Performed by: RADIOLOGY

## 2021-10-21 PROCEDURE — 73030 X-RAY EXAM OF SHOULDER: CPT | Mod: 26,50,HCNC, | Performed by: RADIOLOGY

## 2021-10-21 PROCEDURE — 77063 BREAST TOMOSYNTHESIS BI: CPT | Mod: 26,HCNC,, | Performed by: RADIOLOGY

## 2021-10-21 PROCEDURE — 73030 X-RAY EXAM OF SHOULDER: CPT | Mod: TC,50,HCNC,PN

## 2021-10-21 PROCEDURE — 77067 SCR MAMMO BI INCL CAD: CPT | Mod: 26,HCNC,, | Performed by: RADIOLOGY

## 2021-10-21 PROCEDURE — 73030 XR SHOULDER COMPLETE 2 OR MORE VIEWS BILATERAL: ICD-10-PCS | Mod: 26,50,HCNC, | Performed by: RADIOLOGY

## 2021-10-22 ENCOUNTER — HOSPITAL ENCOUNTER (OUTPATIENT)
Dept: RADIOLOGY | Facility: CLINIC | Age: 71
Discharge: HOME | End: 2021-10-22
Attending: FAMILY MEDICINE
Payer: MEDICARE

## 2021-10-22 DIAGNOSIS — M81.0 OSTEOPOROSIS, UNSPECIFIED OSTEOPOROSIS TYPE, UNSPECIFIED PATHOLOGICAL FRACTURE PRESENCE: ICD-10-CM

## 2021-10-22 PROCEDURE — 77080 DXA BONE DENSITY AXIAL: CPT | Mod: 26,HCNC,, | Performed by: INTERNAL MEDICINE

## 2021-10-22 PROCEDURE — 77080 DXA BONE DENSITY AXIAL: CPT | Mod: TC,HCNC

## 2021-10-22 PROCEDURE — 77080 DEXA BONE DENSITY SPINE HIP: ICD-10-PCS | Mod: 26,HCNC,, | Performed by: INTERNAL MEDICINE

## 2021-11-04 ENCOUNTER — TELEPHONE (OUTPATIENT)
Dept: OPHTHALMOLOGY | Facility: CLINIC | Age: 71
End: 2021-11-04
Payer: MEDICARE

## 2021-11-10 ENCOUNTER — TELEPHONE (OUTPATIENT)
Dept: OPTOMETRY | Facility: CLINIC | Age: 71
End: 2021-11-10
Payer: MEDICARE

## 2021-11-12 ENCOUNTER — TELEPHONE (OUTPATIENT)
Dept: PRIMARY CARE CLINIC | Facility: CLINIC | Age: 71
End: 2021-11-12
Payer: MEDICARE

## 2022-03-30 ENCOUNTER — LAB VISIT (OUTPATIENT)
Dept: LAB | Facility: HOSPITAL | Age: 72
End: 2022-03-30
Attending: FAMILY MEDICINE
Payer: MEDICARE

## 2022-03-30 DIAGNOSIS — E78.2 MIXED HYPERLIPIDEMIA: ICD-10-CM

## 2022-03-30 LAB
CHOLEST SERPL-MCNC: 218 MG/DL (ref 120–199)
CHOLEST/HDLC SERPL: 2.3 {RATIO} (ref 2–5)
HDLC SERPL-MCNC: 93 MG/DL (ref 40–75)
HDLC SERPL: 42.7 % (ref 20–50)
LDLC SERPL CALC-MCNC: 110.8 MG/DL (ref 63–159)
NONHDLC SERPL-MCNC: 125 MG/DL
TRIGL SERPL-MCNC: 71 MG/DL (ref 30–150)

## 2022-03-30 PROCEDURE — 80061 LIPID PANEL: CPT | Performed by: FAMILY MEDICINE

## 2022-03-30 PROCEDURE — 36415 COLL VENOUS BLD VENIPUNCTURE: CPT | Mod: PN | Performed by: FAMILY MEDICINE

## 2022-04-06 ENCOUNTER — OFFICE VISIT (OUTPATIENT)
Dept: PRIMARY CARE CLINIC | Facility: CLINIC | Age: 72
End: 2022-04-06
Payer: MEDICARE

## 2022-04-06 VITALS
DIASTOLIC BLOOD PRESSURE: 70 MMHG | SYSTOLIC BLOOD PRESSURE: 110 MMHG | TEMPERATURE: 98 F | WEIGHT: 93.06 LBS | HEART RATE: 90 BPM | OXYGEN SATURATION: 96 % | HEIGHT: 64 IN | BODY MASS INDEX: 15.89 KG/M2

## 2022-04-06 DIAGNOSIS — K21.00 GASTROESOPHAGEAL REFLUX DISEASE WITH ESOPHAGITIS WITHOUT HEMORRHAGE: Primary | ICD-10-CM

## 2022-04-06 DIAGNOSIS — K58.0 IRRITABLE BOWEL SYNDROME WITH DIARRHEA: ICD-10-CM

## 2022-04-06 PROCEDURE — 3074F PR MOST RECENT SYSTOLIC BLOOD PRESSURE < 130 MM HG: ICD-10-PCS | Mod: CPTII,S$GLB,, | Performed by: FAMILY MEDICINE

## 2022-04-06 PROCEDURE — 1126F AMNT PAIN NOTED NONE PRSNT: CPT | Mod: CPTII,S$GLB,, | Performed by: FAMILY MEDICINE

## 2022-04-06 PROCEDURE — 1126F PR PAIN SEVERITY QUANTIFIED, NO PAIN PRESENT: ICD-10-PCS | Mod: CPTII,S$GLB,, | Performed by: FAMILY MEDICINE

## 2022-04-06 PROCEDURE — 3288F FALL RISK ASSESSMENT DOCD: CPT | Mod: CPTII,S$GLB,, | Performed by: FAMILY MEDICINE

## 2022-04-06 PROCEDURE — 1159F MED LIST DOCD IN RCRD: CPT | Mod: CPTII,S$GLB,, | Performed by: FAMILY MEDICINE

## 2022-04-06 PROCEDURE — 1159F PR MEDICATION LIST DOCUMENTED IN MEDICAL RECORD: ICD-10-PCS | Mod: CPTII,S$GLB,, | Performed by: FAMILY MEDICINE

## 2022-04-06 PROCEDURE — 99999 PR PBB SHADOW E&M-EST. PATIENT-LVL III: ICD-10-PCS | Mod: PBBFAC,,, | Performed by: FAMILY MEDICINE

## 2022-04-06 PROCEDURE — 3008F PR BODY MASS INDEX (BMI) DOCUMENTED: ICD-10-PCS | Mod: CPTII,S$GLB,, | Performed by: FAMILY MEDICINE

## 2022-04-06 PROCEDURE — 3078F PR MOST RECENT DIASTOLIC BLOOD PRESSURE < 80 MM HG: ICD-10-PCS | Mod: CPTII,S$GLB,, | Performed by: FAMILY MEDICINE

## 2022-04-06 PROCEDURE — 99999 PR PBB SHADOW E&M-EST. PATIENT-LVL III: CPT | Mod: PBBFAC,,, | Performed by: FAMILY MEDICINE

## 2022-04-06 PROCEDURE — 3074F SYST BP LT 130 MM HG: CPT | Mod: CPTII,S$GLB,, | Performed by: FAMILY MEDICINE

## 2022-04-06 PROCEDURE — 1101F PR PT FALLS ASSESS DOC 0-1 FALLS W/OUT INJ PAST YR: ICD-10-PCS | Mod: CPTII,S$GLB,, | Performed by: FAMILY MEDICINE

## 2022-04-06 PROCEDURE — 3288F PR FALLS RISK ASSESSMENT DOCUMENTED: ICD-10-PCS | Mod: CPTII,S$GLB,, | Performed by: FAMILY MEDICINE

## 2022-04-06 PROCEDURE — 99214 OFFICE O/P EST MOD 30 MIN: CPT | Mod: S$GLB,,, | Performed by: FAMILY MEDICINE

## 2022-04-06 PROCEDURE — 3078F DIAST BP <80 MM HG: CPT | Mod: CPTII,S$GLB,, | Performed by: FAMILY MEDICINE

## 2022-04-06 PROCEDURE — 1101F PT FALLS ASSESS-DOCD LE1/YR: CPT | Mod: CPTII,S$GLB,, | Performed by: FAMILY MEDICINE

## 2022-04-06 PROCEDURE — 3008F BODY MASS INDEX DOCD: CPT | Mod: CPTII,S$GLB,, | Performed by: FAMILY MEDICINE

## 2022-04-06 PROCEDURE — 99214 PR OFFICE/OUTPT VISIT, EST, LEVL IV, 30-39 MIN: ICD-10-PCS | Mod: S$GLB,,, | Performed by: FAMILY MEDICINE

## 2022-04-06 RX ORDER — DICYCLOMINE HYDROCHLORIDE 10 MG/1
10 CAPSULE ORAL 3 TIMES DAILY
Qty: 90 CAPSULE | Refills: 6 | Status: SHIPPED | OUTPATIENT
Start: 2022-04-06 | End: 2022-05-06

## 2022-04-06 RX ORDER — FAMOTIDINE 20 MG/1
20 TABLET, FILM COATED ORAL 2 TIMES DAILY
Qty: 60 TABLET | Refills: 11 | Status: SHIPPED | OUTPATIENT
Start: 2022-04-06 | End: 2023-03-06

## 2022-04-06 RX ORDER — CHLORHEXIDINE GLUCONATE ORAL RINSE 1.2 MG/ML
SOLUTION DENTAL
COMMUNITY
Start: 2022-03-28

## 2022-04-08 NOTE — PROGRESS NOTES
Subjective:       Patient ID: Marshall Almanza is a 72 y.o. female.    Chief Complaint: Follow-up  discuss BMD results . Oral meds not helping  Her bone density. Discuss Prolia and Reclast.  Discuss statin use for her elevated cholesterol   Which was no affected by her dietary changes.  HPIsee above  Review of Systems   Constitutional: Negative.    HENT: Negative.    Eyes: Negative.    Respiratory: Negative.    Cardiovascular: Negative.    Gastrointestinal: Negative.    Endocrine:        Osteoporosis. hyperlipidemia   Genitourinary: Negative.    Musculoskeletal: Negative.    Integumentary:  Negative.   Allergic/Immunologic: Negative.    Neurological: Negative.    Hematological: Negative.    Psychiatric/Behavioral: Negative.          Objective:      Physical Exam  Vitals and nursing note reviewed.   Constitutional:       General: She is not in acute distress.     Appearance: Normal appearance.   HENT:      Head: Normocephalic and atraumatic.      Nose: Nose normal. No congestion or rhinorrhea.      Mouth/Throat:      Pharynx: No oropharyngeal exudate or posterior oropharyngeal erythema.   Eyes:      Extraocular Movements: Extraocular movements intact.      Conjunctiva/sclera: Conjunctivae normal.      Pupils: Pupils are equal, round, and reactive to light.   Cardiovascular:      Rate and Rhythm: Normal rate and regular rhythm.      Pulses: Normal pulses.      Heart sounds: Normal heart sounds.   Pulmonary:      Effort: Pulmonary effort is normal. No respiratory distress.      Breath sounds: Normal breath sounds.   Abdominal:      General: Abdomen is flat. Bowel sounds are normal.      Palpations: Abdomen is soft.   Musculoskeletal:         General: No swelling, tenderness, deformity or signs of injury. Normal range of motion.      Cervical back: Normal range of motion and neck supple. No rigidity.   Skin:     General: Skin is warm and dry.      Coloration: Skin is not jaundiced or pale.   Neurological:      General: No  focal deficit present.      Mental Status: She is alert and oriented to person, place, and time. Mental status is at baseline.   Psychiatric:         Mood and Affect: Mood normal.         Behavior: Behavior normal.         Thought Content: Thought content normal.         Judgment: Judgment normal.         Assessment:       Problem List Items Addressed This Visit     GERD (gastroesophageal reflux disease) - Primary    Relevant Medications    famotidine (PEPCID) 20 MG tablet      Other Visit Diagnoses     Irritable bowel syndrome with diarrhea        Relevant Medications    dicyclomine (BENTYL) 10 MG capsule          Plan:     1. Gastroesophageal reflux disease with esophagitis without hemorrhage  - famotidine (PEPCID) 20 MG tablet; Take 1 tablet (20 mg total) by mouth 2 (two) times daily.  Dispense: 60 tablet; Refill: 11    2. Irritable bowel syndrome with diarrhea  - dicyclomine (BENTYL) 10 MG capsule; Take 1 capsule (10 mg total) by mouth 3 (three) times daily.  Dispense: 90 capsule; Refill: 6      pt will consider medications discussed for bone density and elevate cholesterol.  She will call and let us know if she decides to take them

## 2022-05-17 ENCOUNTER — OFFICE VISIT (OUTPATIENT)
Dept: OPTOMETRY | Facility: CLINIC | Age: 72
End: 2022-05-17
Payer: MEDICARE

## 2022-05-17 DIAGNOSIS — H52.7 REFRACTIVE ERROR: ICD-10-CM

## 2022-05-17 DIAGNOSIS — H25.13 NUCLEAR SENILE CATARACT OF BOTH EYES: Primary | ICD-10-CM

## 2022-05-17 PROCEDURE — 92004 COMPRE OPH EXAM NEW PT 1/>: CPT | Mod: S$GLB,,, | Performed by: OPTOMETRIST

## 2022-05-17 PROCEDURE — 1101F PR PT FALLS ASSESS DOC 0-1 FALLS W/OUT INJ PAST YR: ICD-10-PCS | Mod: CPTII,S$GLB,, | Performed by: OPTOMETRIST

## 2022-05-17 PROCEDURE — 1126F PR PAIN SEVERITY QUANTIFIED, NO PAIN PRESENT: ICD-10-PCS | Mod: CPTII,S$GLB,, | Performed by: OPTOMETRIST

## 2022-05-17 PROCEDURE — 92015 DETERMINE REFRACTIVE STATE: CPT | Mod: S$GLB,,, | Performed by: OPTOMETRIST

## 2022-05-17 PROCEDURE — 1126F AMNT PAIN NOTED NONE PRSNT: CPT | Mod: CPTII,S$GLB,, | Performed by: OPTOMETRIST

## 2022-05-17 PROCEDURE — 92015 PR REFRACTION: ICD-10-PCS | Mod: S$GLB,,, | Performed by: OPTOMETRIST

## 2022-05-17 PROCEDURE — 1159F MED LIST DOCD IN RCRD: CPT | Mod: CPTII,S$GLB,, | Performed by: OPTOMETRIST

## 2022-05-17 PROCEDURE — 1101F PT FALLS ASSESS-DOCD LE1/YR: CPT | Mod: CPTII,S$GLB,, | Performed by: OPTOMETRIST

## 2022-05-17 PROCEDURE — 1159F PR MEDICATION LIST DOCUMENTED IN MEDICAL RECORD: ICD-10-PCS | Mod: CPTII,S$GLB,, | Performed by: OPTOMETRIST

## 2022-05-17 PROCEDURE — 3288F FALL RISK ASSESSMENT DOCD: CPT | Mod: CPTII,S$GLB,, | Performed by: OPTOMETRIST

## 2022-05-17 PROCEDURE — 3288F PR FALLS RISK ASSESSMENT DOCUMENTED: ICD-10-PCS | Mod: CPTII,S$GLB,, | Performed by: OPTOMETRIST

## 2022-05-17 PROCEDURE — 92004 PR EYE EXAM, NEW PATIENT,COMPREHESV: ICD-10-PCS | Mod: S$GLB,,, | Performed by: OPTOMETRIST

## 2022-05-17 PROCEDURE — 99999 PR PBB SHADOW E&M-EST. PATIENT-LVL II: CPT | Mod: PBBFAC,,, | Performed by: OPTOMETRIST

## 2022-05-17 PROCEDURE — 1160F PR REVIEW ALL MEDS BY PRESCRIBER/CLIN PHARMACIST DOCUMENTED: ICD-10-PCS | Mod: CPTII,S$GLB,, | Performed by: OPTOMETRIST

## 2022-05-17 PROCEDURE — 99999 PR PBB SHADOW E&M-EST. PATIENT-LVL II: ICD-10-PCS | Mod: PBBFAC,,, | Performed by: OPTOMETRIST

## 2022-05-17 PROCEDURE — 1160F RVW MEDS BY RX/DR IN RCRD: CPT | Mod: CPTII,S$GLB,, | Performed by: OPTOMETRIST

## 2022-05-17 NOTE — PROGRESS NOTES
HPI     71 Y/o female is here for routine eye exam with C/o see decrease in   vision both near and distance   Pt denies pain and discomfort   Pt see's occasional floaters in OD       Eye med: [facility] QD     Last edited by Sky Rasmussen MA on 5/17/2022  7:35 AM. (History)            Assessment /Plan     For exam results, see Encounter Report.    Nuclear senile cataract of both eyes    Refractive error      1. Educated pt on presence of cataracts and effects on vision. No surgery at this time. Recheck in one year.  2. Spectacle Rx given, discussed different options for glasses. RTC 1 year routine eye exam.

## 2022-08-29 ENCOUNTER — TELEPHONE (OUTPATIENT)
Dept: PRIMARY CARE CLINIC | Facility: CLINIC | Age: 72
End: 2022-08-29
Payer: MEDICARE

## 2022-08-29 NOTE — TELEPHONE ENCOUNTER
----- Message from Rakesh Coe sent at 8/29/2022  2:02 PM CDT -----  Contact: pt [phone]  type: Lab    Caller is requesting to schedule their Lab appointment prior to annual appointment.  Order is not listed in EPIC.  Please enter order and contact patient to schedule.    Name of Caller:ANTOINE FULLER [[id number]]    Preferred Date and Time of Labs a week before annual appt    Date of Annual Physical Appointment:11/2/22    Where would they like the lab performed?[facility]    Would the patient rather a call back or a response via [facility]? call    Best Call Back Number:pt [phone]    Additional Information:

## 2022-12-01 DIAGNOSIS — K21.00 GASTROESOPHAGEAL REFLUX DISEASE WITH ESOPHAGITIS WITHOUT HEMORRHAGE: Primary | ICD-10-CM

## 2022-12-01 RX ORDER — ESOMEPRAZOLE MAGNESIUM 40 MG/1
CAPSULE, DELAYED RELEASE ORAL
COMMUNITY
Start: 2022-09-12 | End: 2022-12-01 | Stop reason: SDUPTHER

## 2022-12-01 NOTE — TELEPHONE ENCOUNTER
----- Message from Laura Narayanan sent at 12/1/2022  3:38 PM CST -----  Contact: [phone]  Requesting an RX refill or new RX.  Is this a refill or new RX: refill  RX name and strength :  NEXIUM 40 MG  Is this a 30 day or 90 day RX:   Pharmacy name and phone # :  [id number] - [place], [place] - [address]   Phone:  [phone]  Fax:  [phone]  Patient says the Pepcid did not work for her and she want the Nexium

## 2022-12-01 NOTE — TELEPHONE ENCOUNTER
LOV 4/6/22  NP 3/6/23      Patient says the Pepcid did not work for her and she want the Nexium       Please advised

## 2022-12-05 RX ORDER — ESOMEPRAZOLE MAGNESIUM 40 MG/1
40 CAPSULE, DELAYED RELEASE ORAL DAILY
Qty: 90 CAPSULE | Refills: 3 | Status: SHIPPED | OUTPATIENT
Start: 2022-12-05

## 2023-03-06 ENCOUNTER — TELEPHONE (OUTPATIENT)
Dept: OTOLARYNGOLOGY | Facility: CLINIC | Age: 73
End: 2023-03-06
Payer: MEDICARE

## 2023-03-06 ENCOUNTER — OFFICE VISIT (OUTPATIENT)
Dept: PRIMARY CARE CLINIC | Facility: CLINIC | Age: 73
End: 2023-03-06
Payer: MEDICARE

## 2023-03-06 VITALS
RESPIRATION RATE: 18 BRPM | HEART RATE: 86 BPM | WEIGHT: 97.69 LBS | SYSTOLIC BLOOD PRESSURE: 121 MMHG | OXYGEN SATURATION: 99 % | HEIGHT: 64 IN | TEMPERATURE: 98 F | BODY MASS INDEX: 16.68 KG/M2 | DIASTOLIC BLOOD PRESSURE: 70 MMHG

## 2023-03-06 DIAGNOSIS — Z12.31 OTHER SCREENING MAMMOGRAM: ICD-10-CM

## 2023-03-06 DIAGNOSIS — E04.2 MULTIPLE THYROID NODULES: ICD-10-CM

## 2023-03-06 DIAGNOSIS — K21.9 LARYNGOPHARYNGEAL REFLUX (LPR): ICD-10-CM

## 2023-03-06 DIAGNOSIS — Z00.00 ROUTINE GENERAL MEDICAL EXAMINATION AT A HEALTH CARE FACILITY: Primary | ICD-10-CM

## 2023-03-06 DIAGNOSIS — E46 PROTEIN-CALORIE MALNUTRITION, UNSPECIFIED SEVERITY: ICD-10-CM

## 2023-03-06 DIAGNOSIS — M81.0 OSTEOPOROSIS, UNSPECIFIED OSTEOPOROSIS TYPE, UNSPECIFIED PATHOLOGICAL FRACTURE PRESENCE: ICD-10-CM

## 2023-03-06 DIAGNOSIS — K21.9 GASTROESOPHAGEAL REFLUX DISEASE, UNSPECIFIED WHETHER ESOPHAGITIS PRESENT: ICD-10-CM

## 2023-03-06 DIAGNOSIS — I70.0 AORTIC ATHEROSCLEROSIS: ICD-10-CM

## 2023-03-06 DIAGNOSIS — R79.9 ABNORMAL FINDING OF BLOOD CHEMISTRY, UNSPECIFIED: ICD-10-CM

## 2023-03-06 DIAGNOSIS — Z23 IMMUNIZATION DUE: ICD-10-CM

## 2023-03-06 DIAGNOSIS — M19.90 OSTEOARTHRITIS, UNSPECIFIED OSTEOARTHRITIS TYPE, UNSPECIFIED SITE: ICD-10-CM

## 2023-03-06 PROCEDURE — 3288F FALL RISK ASSESSMENT DOCD: CPT | Mod: HCNC,CPTII,S$GLB, | Performed by: INTERNAL MEDICINE

## 2023-03-06 PROCEDURE — 3078F PR MOST RECENT DIASTOLIC BLOOD PRESSURE < 80 MM HG: ICD-10-PCS | Mod: HCNC,CPTII,S$GLB, | Performed by: INTERNAL MEDICINE

## 2023-03-06 PROCEDURE — 3074F PR MOST RECENT SYSTOLIC BLOOD PRESSURE < 130 MM HG: ICD-10-PCS | Mod: HCNC,CPTII,S$GLB, | Performed by: INTERNAL MEDICINE

## 2023-03-06 PROCEDURE — 3008F PR BODY MASS INDEX (BMI) DOCUMENTED: ICD-10-PCS | Mod: HCNC,CPTII,S$GLB, | Performed by: INTERNAL MEDICINE

## 2023-03-06 PROCEDURE — 99999 PR PBB SHADOW E&M-EST. PATIENT-LVL V: CPT | Mod: PBBFAC,HCNC,, | Performed by: INTERNAL MEDICINE

## 2023-03-06 PROCEDURE — 1101F PR PT FALLS ASSESS DOC 0-1 FALLS W/OUT INJ PAST YR: ICD-10-PCS | Mod: HCNC,CPTII,S$GLB, | Performed by: INTERNAL MEDICINE

## 2023-03-06 PROCEDURE — 3074F SYST BP LT 130 MM HG: CPT | Mod: HCNC,CPTII,S$GLB, | Performed by: INTERNAL MEDICINE

## 2023-03-06 PROCEDURE — 1126F AMNT PAIN NOTED NONE PRSNT: CPT | Mod: HCNC,CPTII,S$GLB, | Performed by: INTERNAL MEDICINE

## 2023-03-06 PROCEDURE — 1159F PR MEDICATION LIST DOCUMENTED IN MEDICAL RECORD: ICD-10-PCS | Mod: HCNC,CPTII,S$GLB, | Performed by: INTERNAL MEDICINE

## 2023-03-06 PROCEDURE — 99397 PR PREVENTIVE VISIT,EST,65 & OVER: ICD-10-PCS | Mod: HCNC,S$GLB,, | Performed by: INTERNAL MEDICINE

## 2023-03-06 PROCEDURE — 99397 PER PM REEVAL EST PAT 65+ YR: CPT | Mod: HCNC,S$GLB,, | Performed by: INTERNAL MEDICINE

## 2023-03-06 PROCEDURE — 1159F MED LIST DOCD IN RCRD: CPT | Mod: HCNC,CPTII,S$GLB, | Performed by: INTERNAL MEDICINE

## 2023-03-06 PROCEDURE — 99999 PR PBB SHADOW E&M-EST. PATIENT-LVL V: ICD-10-PCS | Mod: PBBFAC,HCNC,, | Performed by: INTERNAL MEDICINE

## 2023-03-06 PROCEDURE — 3288F PR FALLS RISK ASSESSMENT DOCUMENTED: ICD-10-PCS | Mod: HCNC,CPTII,S$GLB, | Performed by: INTERNAL MEDICINE

## 2023-03-06 PROCEDURE — 3008F BODY MASS INDEX DOCD: CPT | Mod: HCNC,CPTII,S$GLB, | Performed by: INTERNAL MEDICINE

## 2023-03-06 PROCEDURE — 3078F DIAST BP <80 MM HG: CPT | Mod: HCNC,CPTII,S$GLB, | Performed by: INTERNAL MEDICINE

## 2023-03-06 PROCEDURE — 1101F PT FALLS ASSESS-DOCD LE1/YR: CPT | Mod: HCNC,CPTII,S$GLB, | Performed by: INTERNAL MEDICINE

## 2023-03-06 PROCEDURE — 1126F PR PAIN SEVERITY QUANTIFIED, NO PAIN PRESENT: ICD-10-PCS | Mod: HCNC,CPTII,S$GLB, | Performed by: INTERNAL MEDICINE

## 2023-03-06 RX ORDER — DICYCLOMINE HYDROCHLORIDE 10 MG/1
CAPSULE ORAL
COMMUNITY
Start: 2022-10-01 | End: 2023-08-02

## 2023-03-06 NOTE — TELEPHONE ENCOUNTER
----- Message from Briseyda Knight sent at 3/6/2023 11:46 AM CST -----  Please assist with scheduling with ENT patient states she does not want to see Dr. Lashaun Harris. Please assist with scheduling. Thank you!

## 2023-03-06 NOTE — TELEPHONE ENCOUNTER
----- Message from Lata Garcia sent at 3/6/2023  1:27 PM CST -----  Regarding: Appt  Contact: Pt [phone]  Patient called to schedule appt states she do not want to se [facility] Please call to discuss further

## 2023-03-06 NOTE — PROGRESS NOTES
Subjective:      Patient ID: Marshall Almanza is a 72 y.o. female.    Chief Complaint: Annual Exam and Establish Care    71 yo female with PMH significant for GERD, LPR, cataracts, multiple thyroid nodules, osteoporosis, OA     Multinodular thyroid: Hx of multinodular thyroid, s/p US on 10/2020. At that time, none of the nodules met the criteria for recommendation for biopsy and none of the nodules needs additional imaging. Obtain TSH at this visit.     Osteoporosis: Has been on fosamax and vitamin D. Last DEXA scan 10/2021 show continued bone density loss. Prolia was recommended at that time. Consider endocrinology consult. Repeat DEXA in 10/2023.     LPR: Reports worsening LPR. Currently takes nexium qday to help with LPR. Reports that when she stops medication she has hoarse voice and notes difficulty swallowing. Would like referral to ENT for follow up.     OA: Discussed her arthritis today.     Denies any chest pain, shortness of breath, nausea vomiting constipation diarrhea, blood in stool, heartburn    Due for mammogram, shingles, pneumonia vaccine, updated tdap.     Review of Systems   Constitutional:  Negative for chills, fever and weight loss.   HENT:  Negative for congestion, ear pain and sore throat.    Eyes:  Negative for double vision.   Respiratory:  Negative for cough and shortness of breath.    Cardiovascular:  Negative for chest pain, palpitations and leg swelling.   Gastrointestinal:  Negative for abdominal pain, heartburn, nausea and vomiting.   Skin:  Negative for rash.   Neurological:  Negative for dizziness, tingling and headaches.   Psychiatric/Behavioral:  Negative for depression.        Current Outpatient Medications:     esomeprazole (NEXIUM) 40 MG capsule, Take 1 capsule (40 mg total) by mouth once daily., Disp: 90 capsule, Rfl: 3    multivitamin (THERAGRAN) per tablet, Take 1 tablet by mouth Daily., Disp: , Rfl:     chlorhexidine (PERIDEX) 0.12 % solution, , Disp: , Rfl:     dicyclomine  (BENTYL) 10 MG capsule, Take by mouth., Disp: , Rfl:     Lab Results   Component Value Date    HGBA1C 5.6 11/17/2015    HGBA1C 5.7 07/12/2013    HGBA1C 5.7 07/16/2012     No results found for: MICALBCREAT  Lab Results   Component Value Date    LDLCALC 110.8 03/30/2022    LDLCALC 110.0 10/15/2021    CHOL 218 (H) 03/30/2022    HDL 93 (H) 03/30/2022    TRIG 71 03/30/2022       Lab Results   Component Value Date     10/15/2021    K 4.6 10/15/2021     10/15/2021    CO2 26 10/15/2021    GLU 97 10/15/2021    BUN 16 10/15/2021    CREATININE 0.8 10/15/2021    CALCIUM 9.7 10/15/2021    PROT 7.1 10/15/2021    ALBUMIN 4.0 10/15/2021    BILITOT 0.7 10/15/2021    ALKPHOS 52 (L) 10/15/2021    AST 26 10/15/2021    ALT 19 10/15/2021    ANIONGAP 11 10/15/2021    ESTGFRAFRICA >60.0 10/15/2021    EGFRNONAA >60.0 10/15/2021    WBC 4.03 10/15/2021    HGB 13.2 10/15/2021    HGB 14.9 11/18/2020    HCT 41.4 10/15/2021    MCV 95 10/15/2021     10/15/2021    TSH 1.835 10/15/2021    PSA <0.01 09/24/2014    HEPCAB Negative 11/18/2020       Lab Results   Component Value Date    QWKBIKZX95FW 60 11/18/2020    ACQLBRRS99 1047 (H) 11/18/2020    FERRITIN 188 06/16/2016    IRON 103 06/16/2016    TRANSFERRIN 244 06/16/2016    TIBC 361 06/16/2016    FESATURATED 29 06/16/2016         Past Medical History:   Diagnosis Date    Arthritis     Multiple thyroid nodules     Osteoporosis     Reflux     Senile nuclear sclerosis - Both Eyes 10/1/2013     Past Surgical History:   Procedure Laterality Date    COLONOSCOPY N/A 9/14/2020    Procedure: COLONOSCOPY;  Surgeon: Herminio Franco MD;  Location: [facility] (4TH FLR);  Service: Endoscopy;  Laterality: N/A;  Patient would like colonoscopy 1st case of the day with me  Please recommend MiraLax 17 g in 12 oz of water 3 days in 2 days prior  With a split bowel prep.        COVID test at [facility] on 9/11-GT     Social History     Social History Narrative    Not on file     Family History  "  Problem Relation Age of Onset    Cataracts Mother     Cancer Father         leukemia    Amblyopia Neg Hx     Blindness Neg Hx     Diabetes Neg Hx     Glaucoma Neg Hx     Hypertension Neg Hx     Macular degeneration Neg Hx     Retinal detachment Neg Hx     Strabismus Neg Hx     Stroke Neg Hx     Thyroid disease Neg Hx     Breast cancer Neg Hx     Colon cancer Neg Hx     Ovarian cancer Neg Hx     Celiac disease Neg Hx     Cirrhosis Neg Hx     Colon polyps Neg Hx     Crohn's disease Neg Hx     Esophageal cancer Neg Hx     Inflammatory bowel disease Neg Hx     Liver cancer Neg Hx     Rectal cancer Neg Hx     Liver disease Neg Hx     Stomach cancer Neg Hx     Ulcerative colitis Neg Hx     Pancreatic cancer Neg Hx      Vitals:    03/06/23 1103   BP: 121/70   Pulse: 86   Resp: 18   Temp: 98 °F (36.7 °C)   SpO2: 99%   Weight: 44.3 kg (97 lb 10.6 oz)   Height: 5' 4" (1.626 m)   PainSc: 0-No pain     Objective:   Physical Exam  Vitals reviewed.   Constitutional:       Appearance: Normal appearance.   HENT:      Head: Normocephalic.      Right Ear: Tympanic membrane, ear canal and external ear normal.      Left Ear: Tympanic membrane, ear canal and external ear normal.      Nose: Nose normal.      Mouth/Throat:      Mouth: Mucous membranes are moist.      Pharynx: Oropharynx is clear.   Eyes:      Conjunctiva/sclera: Conjunctivae normal.      Pupils: Pupils are equal, round, and reactive to light.   Cardiovascular:      Rate and Rhythm: Normal rate and regular rhythm.      Pulses: Normal pulses.   Pulmonary:      Effort: Pulmonary effort is normal.      Breath sounds: Normal breath sounds.   Abdominal:      General: Abdomen is flat. Bowel sounds are normal.      Palpations: Abdomen is soft.   Musculoskeletal:      Cervical back: Neck supple.   Skin:     General: Skin is warm.   Neurological:      General: No focal deficit present.      Mental Status: She is alert.   Psychiatric:         Mood and Affect: Mood normal. "     Assessment:     1. Routine general medical examination at a health care facility    2. Other screening mammogram    3. Gastroesophageal reflux disease, unspecified whether esophagitis present    4. Multiple thyroid nodules    5. Osteoporosis, unspecified osteoporosis type, unspecified pathological fracture presence    6. Osteoarthritis, unspecified osteoarthritis type, unspecified site    7. Immunization due    8. Laryngopharyngeal reflux (LPR)    9. Abnormal finding of blood chemistry, unspecified    10. Aortic atherosclerosis    11. Protein-calorie malnutrition, unspecified severity      Plan:     Orders Placed This Encounter    Mammo Digital Screening Bilat    DXA Bone Density Axial Skeleton 1 or more sites    CBC Auto Differential    Comprehensive Metabolic Panel    TSH    Lipid Panel    Vitamin D    Ambulatory referral/consult to ENT       Patient Instructions   Labs are fasting. Please do not eat or drink anything other than water for 8-10 hrs prior to your lab work.    12 months for well visit or sooner if needed.   Tests to Keep You Healthy    Mammogram: SCHEDULED  Colon Cancer Screening: Met on 9/14/2020

## 2023-03-07 ENCOUNTER — OFFICE VISIT (OUTPATIENT)
Dept: OTOLARYNGOLOGY | Facility: CLINIC | Age: 73
End: 2023-03-07
Payer: MEDICARE

## 2023-03-07 ENCOUNTER — HOSPITAL ENCOUNTER (OUTPATIENT)
Dept: RADIOLOGY | Facility: HOSPITAL | Age: 73
Discharge: HOME | End: 2023-03-07
Attending: INTERNAL MEDICINE
Payer: MEDICARE

## 2023-03-07 VITALS
SYSTOLIC BLOOD PRESSURE: 116 MMHG | DIASTOLIC BLOOD PRESSURE: 73 MMHG | WEIGHT: 97 LBS | BODY MASS INDEX: 16.65 KG/M2 | HEART RATE: 92 BPM

## 2023-03-07 DIAGNOSIS — K21.9 LARYNGOPHARYNGEAL REFLUX (LPR): Primary | ICD-10-CM

## 2023-03-07 DIAGNOSIS — Z12.31 OTHER SCREENING MAMMOGRAM: ICD-10-CM

## 2023-03-07 DIAGNOSIS — Z00.00 ROUTINE GENERAL MEDICAL EXAMINATION AT A HEALTH CARE FACILITY: ICD-10-CM

## 2023-03-07 PROCEDURE — 99999 PR PBB SHADOW E&M-EST. PATIENT-LVL III: ICD-10-PCS | Mod: PBBFAC,HCNC,, | Performed by: NURSE PRACTITIONER

## 2023-03-07 PROCEDURE — 3008F PR BODY MASS INDEX (BMI) DOCUMENTED: ICD-10-PCS | Mod: HCNC,CPTII,S$GLB, | Performed by: NURSE PRACTITIONER

## 2023-03-07 PROCEDURE — 77067 SCR MAMMO BI INCL CAD: CPT | Mod: TC

## 2023-03-07 PROCEDURE — 77063 BREAST TOMOSYNTHESIS BI: CPT | Mod: 26,,, | Performed by: RADIOLOGY

## 2023-03-07 PROCEDURE — 99203 OFFICE O/P NEW LOW 30 MIN: CPT | Mod: HCNC,25,S$GLB, | Performed by: NURSE PRACTITIONER

## 2023-03-07 PROCEDURE — 3008F BODY MASS INDEX DOCD: CPT | Mod: HCNC,CPTII,S$GLB, | Performed by: NURSE PRACTITIONER

## 2023-03-07 PROCEDURE — 1159F PR MEDICATION LIST DOCUMENTED IN MEDICAL RECORD: ICD-10-PCS | Mod: HCNC,CPTII,S$GLB, | Performed by: NURSE PRACTITIONER

## 2023-03-07 PROCEDURE — 1159F MED LIST DOCD IN RCRD: CPT | Mod: HCNC,CPTII,S$GLB, | Performed by: NURSE PRACTITIONER

## 2023-03-07 PROCEDURE — 3078F PR MOST RECENT DIASTOLIC BLOOD PRESSURE < 80 MM HG: ICD-10-PCS | Mod: HCNC,CPTII,S$GLB, | Performed by: NURSE PRACTITIONER

## 2023-03-07 PROCEDURE — 77067 SCR MAMMO BI INCL CAD: CPT | Mod: 26,,, | Performed by: RADIOLOGY

## 2023-03-07 PROCEDURE — 77067 MAMMO DIGITAL SCREENING BILAT WITH TOMO: ICD-10-PCS | Mod: 26,,, | Performed by: RADIOLOGY

## 2023-03-07 PROCEDURE — 3074F PR MOST RECENT SYSTOLIC BLOOD PRESSURE < 130 MM HG: ICD-10-PCS | Mod: HCNC,CPTII,S$GLB, | Performed by: NURSE PRACTITIONER

## 2023-03-07 PROCEDURE — 31575 PR LARYNGOSCOPY, FLEXIBLE; DIAGNOSTIC: ICD-10-PCS | Mod: HCNC,S$GLB,, | Performed by: NURSE PRACTITIONER

## 2023-03-07 PROCEDURE — 1126F AMNT PAIN NOTED NONE PRSNT: CPT | Mod: HCNC,CPTII,S$GLB, | Performed by: NURSE PRACTITIONER

## 2023-03-07 PROCEDURE — 1126F PR PAIN SEVERITY QUANTIFIED, NO PAIN PRESENT: ICD-10-PCS | Mod: HCNC,CPTII,S$GLB, | Performed by: NURSE PRACTITIONER

## 2023-03-07 PROCEDURE — 3078F DIAST BP <80 MM HG: CPT | Mod: HCNC,CPTII,S$GLB, | Performed by: NURSE PRACTITIONER

## 2023-03-07 PROCEDURE — 31575 DIAGNOSTIC LARYNGOSCOPY: CPT | Mod: HCNC,S$GLB,, | Performed by: NURSE PRACTITIONER

## 2023-03-07 PROCEDURE — 77063 MAMMO DIGITAL SCREENING BILAT WITH TOMO: ICD-10-PCS | Mod: 26,,, | Performed by: RADIOLOGY

## 2023-03-07 PROCEDURE — 99203 PR OFFICE/OUTPT VISIT, NEW, LEVL III, 30-44 MIN: ICD-10-PCS | Mod: HCNC,25,S$GLB, | Performed by: NURSE PRACTITIONER

## 2023-03-07 PROCEDURE — 3074F SYST BP LT 130 MM HG: CPT | Mod: HCNC,CPTII,S$GLB, | Performed by: NURSE PRACTITIONER

## 2023-03-07 PROCEDURE — 99999 PR PBB SHADOW E&M-EST. PATIENT-LVL III: CPT | Mod: PBBFAC,HCNC,, | Performed by: NURSE PRACTITIONER

## 2023-03-07 NOTE — ASSESSMENT & PLAN NOTE
Symptoms likely caused by reflux. Discussed taking an alginate with meals such as Reflux Gourmet if she does not wish to stay on a PPI. Lifestyle modifications and laryngeal hygiene discussed. She should also follow up with GI. Questions answered. RTC if symptoms worsen or fail to improve.

## 2023-03-07 NOTE — PROGRESS NOTES
Subjective:       Patient ID: Marshall Almanza is a 72 y.o. female.    Chief Complaint: globus    HPI    Marshall Almanza is a 62 year old female who was referred for possible LPR.  She complains of frequent throat clearing and globus sensation. Her voice is sometimes hoarse. She has been diagnosed with LPR in the past. She is taking Nexium, which has given some relief. If she stops the medication, her symptoms return.  She does not wish to be on a PPI long term due to potential side effects. After eating she will have a bitter taste in her mouth. She does not have any throat pain. There is no dysphagia or odynophagia. No other complaints.    Past Medical History:   Diagnosis Date    Arthritis     Multiple thyroid nodules     Osteoporosis     Reflux     Senile nuclear sclerosis - Both Eyes 10/1/2013       Past Surgical History:   Procedure Laterality Date    COLONOSCOPY N/A 9/14/2020    Procedure: COLONOSCOPY;  Surgeon: Herminio Farnco MD;  Location: [address]);  Service: Endoscopy;  Laterality: N/A;  Patient would like colonoscopy 1st case of the day with me  Please recommend MiraLax 17 g in 12 oz of water 3 days in 2 days prior  With a split bowel prep.        COVID test at [facility] on 9/11-GT         Current Outpatient Medications:     chlorhexidine (PERIDEX) 0.12 % solution, , Disp: , Rfl:     dicyclomine (BENTYL) 10 MG capsule, Take by mouth., Disp: , Rfl:     esomeprazole (NEXIUM) 40 MG capsule, Take 1 capsule (40 mg total) by mouth once daily., Disp: 90 capsule, Rfl: 3    multivitamin (THERAGRAN) per tablet, Take 1 tablet by mouth Daily., Disp: , Rfl:     Review of patient's allergies indicates:   Allergen Reactions    Ciprofloxacin      palpitations    Flagyl [metronidazole hcl]      Cause urinary frequency       Social History     Socioeconomic History    Marital status: Single    Number of children: 0   Occupational History    Occupation:      Employer: [employer]    Tobacco Use    Smoking status: Never    Smokeless tobacco: Never   Substance and Sexual Activity    Alcohol use: No    Drug use: No    Sexual activity: Not Currently       Family History   Problem Relation Age of Onset    Cataracts Mother     Cancer Father         leukemia    Amblyopia Neg Hx     Blindness Neg Hx     Diabetes Neg Hx     Glaucoma Neg Hx     Hypertension Neg Hx     Macular degeneration Neg Hx     Retinal detachment Neg Hx     Strabismus Neg Hx     Stroke Neg Hx     Thyroid disease Neg Hx     Breast cancer Neg Hx     Colon cancer Neg Hx     Ovarian cancer Neg Hx     Celiac disease Neg Hx     Cirrhosis Neg Hx     Colon polyps Neg Hx     Crohn's disease Neg Hx     Esophageal cancer Neg Hx     Inflammatory bowel disease Neg Hx     Liver cancer Neg Hx     Rectal cancer Neg Hx     Liver disease Neg Hx     Stomach cancer Neg Hx     Ulcerative colitis Neg Hx     Pancreatic cancer Neg Hx      Review of Systems   Constitutional:  Negative for appetite change, chills, diaphoresis, fatigue, fever and unexpected weight change.   HENT:  Positive for voice change. Negative for nasal congestion, dental problem, drooling, ear discharge, ear pain, facial swelling, hearing loss, mouth sores, nosebleeds, postnasal drip, rhinorrhea, sinus pressure/congestion, sneezing, sore throat, tinnitus and trouble swallowing.    Eyes:  Negative for pain, discharge, redness and itching.   Respiratory:  Negative for cough and shortness of breath.    Cardiovascular:  Negative for chest pain.   Gastrointestinal:  Negative for abdominal distention, abdominal pain, diarrhea, nausea and vomiting.   Endocrine: Negative for cold intolerance and heat intolerance.   Genitourinary:  Negative for difficulty urinating.   Musculoskeletal:  Negative for neck pain and neck stiffness.   Integumentary:  Negative for rash.   Neurological:  Negative for dizziness, weakness and headaches.   Hematological:  Negative for adenopathy.       Objective:       Physical Exam  Vitals reviewed.   Constitutional:       General: She is not in acute distress.     Appearance: Normal appearance. She is well-developed. She is not ill-appearing or diaphoretic.   HENT:      Head: Normocephalic and atraumatic.      Jaw: No trismus.      Right Ear: Hearing, tympanic membrane, ear canal and external ear normal.      Left Ear: Hearing, tympanic membrane, ear canal and external ear normal.      Nose: Nose normal. No nasal deformity, mucosal edema or rhinorrhea.      Right Sinus: No maxillary sinus tenderness or frontal sinus tenderness.      Left Sinus: No maxillary sinus tenderness or frontal sinus tenderness.      Mouth/Throat:      Lips: Pink. No lesions.      Mouth: Mucous membranes are moist. Mucous membranes are not pale, not dry and not cyanotic. No oral lesions.      Dentition: Normal dentition. Does not have dentures. No dental caries.      Tongue: No lesions. Tongue does not deviate from midline.      Palate: No mass and lesions.      Pharynx: Oropharynx is clear. Uvula midline. No pharyngeal swelling, oropharyngeal exudate, posterior oropharyngeal erythema or uvula swelling.      Tonsils: No tonsillar abscesses.      Comments: Procedure: Flexible laryngoscopy  In order to fully examine the upper aerodigestive tract, including the larynx, in a patient with a hyperactive gag reflex, flexible endoscopy is required.  After explaining the procedure and obtaining verbal consent, a timeout was performed with the patient's participation according to the universal protocol. Both nasal cavities were anesthetized with 4% Xylocaine spray mixed with Dominick-Synephrine. The flexible laryngoscope (#[id number]) was inserted into the nasal cavity and advanced to visualize the nasal cavity, nasopharynx, the posterior oropharynx, hypopharynx, and the endolarynx with the above findings noted. The scope was removed and the procedure terminated. The patient tolerated this procedure well without apparent  complication.      FINDINGS  Nasopharynx - the torus is clear. There are no lesions of the posterior wall.   Oropharynx - no lesions of the tongue base. There is no obvious fullness or asymmetry.  Hypopharynx - there are no lesions of the pyriform sinuses or postcricoid region   Larynx - there are no lesions of the supraglottic or glottic larynx. There is arytenoid edema and erythema. There is thick white mucus. Vocal fold mobility is normal with complete closure.     Eyes:      General: No scleral icterus.        Right eye: No discharge.         Left eye: No discharge.      Conjunctiva/sclera: Conjunctivae normal.   Neck:      Thyroid: No thyroid mass or thyromegaly.      Vascular: No JVD.      Trachea: Trachea and phonation normal. No tracheal tenderness or tracheal deviation.      Comments: Salivary glands - there are no lesions or asymmetric findings in the submandibular or parotid glands    Cardiovascular:      Rate and Rhythm: Normal rate.   Pulmonary:      Effort: Pulmonary effort is normal. No respiratory distress.      Breath sounds: No stridor.   Musculoskeletal:      Cervical back: Normal range of motion and neck supple.   Lymphadenopathy:      Head:      Right side of head: No submental, submandibular, tonsillar or preauricular adenopathy.      Left side of head: No submental, submandibular, tonsillar or preauricular adenopathy.      Cervical: No cervical adenopathy.   Skin:     General: Skin is warm and dry.      Coloration: Skin is not pale.      Findings: No erythema or rash.   Neurological:      General: No focal deficit present.      Mental Status: She is alert and oriented to person, place, and time.      Cranial Nerves: No cranial nerve deficit.   Psychiatric:         Mood and Affect: Mood normal.         Behavior: Behavior normal. Behavior is cooperative.         Thought Content: Thought content normal.       Assessment:       Problem List Items Addressed This Visit          GI     Laryngopharyngeal reflux (LPR) - Primary     Symptoms likely caused by reflux. Discussed taking an alginate with meals such as Reflux Gourmet if she does not wish to stay on a PPI. Lifestyle modifications and laryngeal hygiene discussed. She should also follow up with GI. Questions answered. RTC if symptoms worsen or fail to improve.

## 2023-03-07 NOTE — PATIENT INSTRUCTIONS
ACID REFLUX   What is acid reflux?    When we eat, food passes from the throat and into the stomach through a tube called the esophagus. At the bottom of the esophagus is a ring of muscles that acts as a valve between the esophagus and stomach, called the lower esophageal sphincter. Smoking, alcohol, and certain types of food may weaken the sphincter, so it may stop closing properly. The contents in the stomach then may leak back, or reflux, into the esophagus. This problem is called gastroesophageal reflux disease (GERD). Symptoms of GERD include heartburn, belching, regurgitation of stomach contents, and swallowing difficulties.    Sometimes, the stomach acid travels up through the esophagus and spills into the larynx or pharynx (voice box). This is called laryngopharyngeal reflux (LPR) and is irritating to the vocal folds and surrounding tissues. Often, patients with LPR do not experience heartburn as a symptom. More commonly, symptoms of LPR include hoarseness, excessive mucous resulting in frequent throat clearing, post-nasal drip, coughing, throat soreness or burning, choking episodes, difficulty swallowing, and sensation of a lump in the throat.     How is acid reflux treated?   Treatment for acid reflux can involve any combination of medication, lifestyle modifications, and surgery.   Medications. Your doctor may prescribe a proton pump inhibitor (PPI) or an H2 blocker. If you are prescribed a PPI, take in on an empty stomach in the morning 30 minutes prior to eating breakfast. Keep in mind that it may take 4-6 weeks before symptoms begin to resolve, so do not stop medications without consulting your doctor.   Lifestyle and dietary modifications. Eat smaller meals at a slower pace. Avoid over-eating. If you are overweight, try to lose weight. Do not lie down or exercise directly after eating; eat your last meal of the day at least 2-3 hours prior to going to sleep. Avoid tight-fitting clothes. If you  are a smoker, reduce or quit smoking. Elevate your head of bed 4-6 inches by putting phone books under the legs at the head of your bed or buy a wedge pillow, but do not use more than two regular pillows as this causes the body to curl and compresses your stomach.     Food group Foods to avoid to reduce reflux   Beverages  Whole milk, 2% milk, chocolate milk/hot chocolate, alcohol, coffee (regular and decaf), caffeinated tea, mint tea, carbonated beverages, citrus juice    Breads/grains Commercial sweet rolls, doughnuts, croissants, and other high-fat pastries    Fruits and vegetables Fried or cream-style vegetables, tomatoes, tomato-based products, citrus fruits, hot peppers    Soups and seasonings Cream, cheese, tomato-based soups, vinegar    Meats and proteins Fatty or fried meat/fish, solomon, sausage, pepperoni, lunch meat, fried eggs    Fats and oil Lard, solomon drippings, salt pork, meat drippings, gravies, highly seasoned salad dressings, nuts    Sweets/desserts Anything made with or from chocolate, peppermint, spearmint, whole milk, or cream; high-fat pastries, gum, hard candy       Reflux Gourmet- Available on Amazon

## 2023-03-07 NOTE — PROGRESS NOTES
She has been diagnosed with LPR in the past. She is taking Nexium, which has given some relief. If she stops the medication, her symptoms return. She reports frequent throat clearing. After eating she will have a bitter taste in her mouth.

## 2023-03-08 ENCOUNTER — TELEPHONE (OUTPATIENT)
Dept: PRIMARY CARE CLINIC | Facility: CLINIC | Age: 73
End: 2023-03-08
Payer: MEDICARE

## 2023-03-08 NOTE — TELEPHONE ENCOUNTER
----- Message from Sara Garay MD sent at 3/8/2023  7:52 AM CST -----  Your blood count (CBC) is unremarkable.    Your sugar number (Glucose) is within normal limits.  Rest of your electrolytes are unremarkable.    Your kidney (BUN, Creatinine and GFT) function is unremarkable.   Your liver (AST, ALT) function is unremarkable.    Your Total cholesterol was high because your good cholesterol (HDL) was high which is a good thing. You do not require medication for this at this time based on your risk score.  I do recommend you to continue to follow a low cholesterol diet and exercise.     Your Thyroid numbers are normal.    Your vitamin D levels are sufficient.

## 2023-03-08 NOTE — LETTER
[facility]  [address]  [facility] [address]  Phone: [phone]  Fax: [phone] March 8, 2023    Marshall Almanza  [place] [id number]      Dear Marshall:    The results of your recent cholesterol test are normal. They are as follows:    Cholesterol (mg/dL)   Date Value   03/07/2023 232 (H)     LDL Cholesterol (mg/dL)   Date Value   03/07/2023 115.8     HDL (mg/dL)   Date Value   03/07/2023 95 (H)     Triglycerides (mg/dL)   Date Value   03/07/2023 106       Based on your risk factors for heart disease, your target levels are:  Total cholesterol: less than 200 mg/dL  LDL (bad) cholesterol: 100 to 129 mg/dL  HDL (good) cholesterol: greater than 40 mg/dL  Triglyceride level: less than 150 mg/dL    You should follow a low fat, low cholesterol diet. Regular exercise can help increase your HDL (good) cholesterol level. Please call our office to schedule a fasting (only water and medications for 12 hours prior to test) cholesterol test in 6 months.    If you have any questions or concerns, please don't hesitate to call.      Sincerely,        Sara Garay MD

## 2023-03-09 ENCOUNTER — TELEPHONE (OUTPATIENT)
Dept: PRIMARY CARE CLINIC | Facility: CLINIC | Age: 73
End: 2023-03-09
Payer: MEDICARE

## 2023-03-09 NOTE — TELEPHONE ENCOUNTER
----- Message from Sara Garay MD sent at 3/9/2023  2:12 PM CST -----  Can we let her know that her mammogram is normal.       ----- Message -----  From: Chris Glynn MD  Sent: 3/9/2023  11:41 AM CST  To: Sara Garay MD

## 2023-05-22 ENCOUNTER — TELEPHONE (OUTPATIENT)
Dept: ENDOSCOPY | Facility: HOSPITAL | Age: 73
End: 2023-05-22
Payer: MEDICARE

## 2023-05-22 NOTE — TELEPHONE ENCOUNTER
----- Message from Ned Romo sent at 5/22/2023 10:08 AM CDT -----  Regarding: Appt needed  Contact: [phone]  Pt is calling to ask  for an appt. She last saw Dr Franco in 2020, but she is now having pain in her abdominal area. Pls call the pt at [phone]

## 2023-05-22 NOTE — TELEPHONE ENCOUNTER
Spoke with patient. Offered appointment with NP. Patient declined. She will call back to schedule when September opens.

## 2023-06-01 ENCOUNTER — TELEPHONE (OUTPATIENT)
Dept: ENDOSCOPY | Facility: HOSPITAL | Age: 73
End: 2023-06-01
Payer: MEDICARE

## 2023-06-01 NOTE — TELEPHONE ENCOUNTER
----- Message from Lelia Nicolas sent at 6/1/2023  8:55 AM CDT -----  Marshall Almanza calling regarding Appointment Access. PT is calling back to see if the doctor appt have opened up for Sept. to be scheduled for Abdominal; Pains when eating, call back to inform [phone]

## 2023-07-17 ENCOUNTER — TELEPHONE (OUTPATIENT)
Dept: PRIMARY CARE CLINIC | Facility: CLINIC | Age: 73
End: 2023-07-17
Payer: MEDICARE

## 2023-07-17 NOTE — TELEPHONE ENCOUNTER
----- Message from Martha Silverman sent at 7/17/2023  8:19 AM CDT -----  Contact: Donrachelletony   1MEDICALADVICE     Patient is calling for Medical Advice regarding: Abdominal Pain     How long has patient had these symptoms:about 2 days     [facility] [address]     Would like response via [employer]: call back     Comments:

## 2023-07-17 NOTE — TELEPHONE ENCOUNTER
Pt advised that Dr Garay do not have any available appointments. Pt will be seen in urgent care or ER for treatment.

## 2023-08-02 ENCOUNTER — OFFICE VISIT (OUTPATIENT)
Dept: PRIMARY CARE CLINIC | Facility: CLINIC | Age: 73
End: 2023-08-02
Payer: MEDICARE

## 2023-08-02 VITALS
HEART RATE: 98 BPM | WEIGHT: 100.06 LBS | OXYGEN SATURATION: 99 % | DIASTOLIC BLOOD PRESSURE: 64 MMHG | BODY MASS INDEX: 17.08 KG/M2 | SYSTOLIC BLOOD PRESSURE: 121 MMHG | TEMPERATURE: 98 F | RESPIRATION RATE: 18 BRPM | HEIGHT: 64 IN

## 2023-08-02 DIAGNOSIS — R10.32 CHRONIC LLQ PAIN: ICD-10-CM

## 2023-08-02 DIAGNOSIS — M25.519 SHOULDER PAIN, UNSPECIFIED CHRONICITY, UNSPECIFIED LATERALITY: ICD-10-CM

## 2023-08-02 DIAGNOSIS — K58.2 IRRITABLE BOWEL SYNDROME WITH BOTH CONSTIPATION AND DIARRHEA: Primary | ICD-10-CM

## 2023-08-02 DIAGNOSIS — M54.2 NECK PAIN: ICD-10-CM

## 2023-08-02 DIAGNOSIS — G89.29 CHRONIC LLQ PAIN: ICD-10-CM

## 2023-08-02 PROCEDURE — 99214 OFFICE O/P EST MOD 30 MIN: CPT | Mod: HCNC,S$GLB,, | Performed by: INTERNAL MEDICINE

## 2023-08-02 PROCEDURE — 1125F AMNT PAIN NOTED PAIN PRSNT: CPT | Mod: HCNC,CPTII,S$GLB, | Performed by: INTERNAL MEDICINE

## 2023-08-02 PROCEDURE — 3288F PR FALLS RISK ASSESSMENT DOCUMENTED: ICD-10-PCS | Mod: HCNC,CPTII,S$GLB, | Performed by: INTERNAL MEDICINE

## 2023-08-02 PROCEDURE — 99999 PR PBB SHADOW E&M-EST. PATIENT-LVL IV: ICD-10-PCS | Mod: PBBFAC,HCNC,, | Performed by: INTERNAL MEDICINE

## 2023-08-02 PROCEDURE — 1159F MED LIST DOCD IN RCRD: CPT | Mod: HCNC,CPTII,S$GLB, | Performed by: INTERNAL MEDICINE

## 2023-08-02 PROCEDURE — 3288F FALL RISK ASSESSMENT DOCD: CPT | Mod: HCNC,CPTII,S$GLB, | Performed by: INTERNAL MEDICINE

## 2023-08-02 PROCEDURE — 1160F PR REVIEW ALL MEDS BY PRESCRIBER/CLIN PHARMACIST DOCUMENTED: ICD-10-PCS | Mod: HCNC,CPTII,S$GLB, | Performed by: INTERNAL MEDICINE

## 2023-08-02 PROCEDURE — 3078F DIAST BP <80 MM HG: CPT | Mod: HCNC,CPTII,S$GLB, | Performed by: INTERNAL MEDICINE

## 2023-08-02 PROCEDURE — 1101F PR PT FALLS ASSESS DOC 0-1 FALLS W/OUT INJ PAST YR: ICD-10-PCS | Mod: HCNC,CPTII,S$GLB, | Performed by: INTERNAL MEDICINE

## 2023-08-02 PROCEDURE — 1160F RVW MEDS BY RX/DR IN RCRD: CPT | Mod: HCNC,CPTII,S$GLB, | Performed by: INTERNAL MEDICINE

## 2023-08-02 PROCEDURE — 3074F SYST BP LT 130 MM HG: CPT | Mod: HCNC,CPTII,S$GLB, | Performed by: INTERNAL MEDICINE

## 2023-08-02 PROCEDURE — 3074F PR MOST RECENT SYSTOLIC BLOOD PRESSURE < 130 MM HG: ICD-10-PCS | Mod: HCNC,CPTII,S$GLB, | Performed by: INTERNAL MEDICINE

## 2023-08-02 PROCEDURE — 3078F PR MOST RECENT DIASTOLIC BLOOD PRESSURE < 80 MM HG: ICD-10-PCS | Mod: HCNC,CPTII,S$GLB, | Performed by: INTERNAL MEDICINE

## 2023-08-02 PROCEDURE — 99999 PR PBB SHADOW E&M-EST. PATIENT-LVL IV: CPT | Mod: PBBFAC,HCNC,, | Performed by: INTERNAL MEDICINE

## 2023-08-02 PROCEDURE — 99214 PR OFFICE/OUTPT VISIT, EST, LEVL IV, 30-39 MIN: ICD-10-PCS | Mod: HCNC,S$GLB,, | Performed by: INTERNAL MEDICINE

## 2023-08-02 PROCEDURE — 1101F PT FALLS ASSESS-DOCD LE1/YR: CPT | Mod: HCNC,CPTII,S$GLB, | Performed by: INTERNAL MEDICINE

## 2023-08-02 PROCEDURE — 1159F PR MEDICATION LIST DOCUMENTED IN MEDICAL RECORD: ICD-10-PCS | Mod: HCNC,CPTII,S$GLB, | Performed by: INTERNAL MEDICINE

## 2023-08-02 PROCEDURE — 1125F PR PAIN SEVERITY QUANTIFIED, PAIN PRESENT: ICD-10-PCS | Mod: HCNC,CPTII,S$GLB, | Performed by: INTERNAL MEDICINE

## 2023-08-02 RX ORDER — DICYCLOMINE HYDROCHLORIDE 20 MG/1
20 TABLET ORAL 3 TIMES DAILY PRN
Qty: 120 TABLET | Refills: 1 | Status: SHIPPED | OUTPATIENT
Start: 2023-08-02 | End: 2023-10-30

## 2023-08-02 RX ORDER — HYOSCYAMINE SULFATE 0.125 MG
125 TABLET ORAL EVERY 6 HOURS PRN
Qty: 90 TABLET | Refills: 1 | Status: SHIPPED | OUTPATIENT
Start: 2023-08-02

## 2023-08-02 NOTE — PROGRESS NOTES
Subjective:      Patient ID: Marshall Almanza is a 73 y.o. female.    Chief Complaint: Abdominal Pain      Marshall Almanza is a 73 y.o. female with chronic conditions significant for GERD, LPR, cataracts, multiple thyroid nodules, osteoporosis, OA.  Presenting today for pain.    LLQ pain/IBS: Here for chronic abdominal pain. She reports that she has pain with eating. Whenever she eats food, she would have LLQ pain and a feeling that she has to defecate. She has not tried anything else at home other than japanese supplement. No concerning sx such as weight loss, guarding, severe abdominal pain. She reports no constipation/diarrhea. She previously has seen Dr. Franco and reports that she was given hyoscyamine which has helped a lot. Discussed side effect of hyoscyamine and she is willing to go back on the medication again.     Neck/shoulder pain: Reports constant neck pain. Denies concerning sx such as weakness/numbness/tingling. She reports that the pain is most prevalent in the morning and improves with stretching and moving. She reports that her shoulder also feels tight since her neck pain has been worsening. Has not tried anything at home. Would recommend PT at this time given the MSK nature of this problem.    Denies any chest pain, shortness of breath, nausea vomiting constipation diarrhea, blood in stool, heartburn    Review of Systems   Constitutional:  Negative for chills, fever and weight loss.   HENT:  Negative for congestion, ear pain and sore throat.    Eyes:  Negative for double vision.   Respiratory:  Negative for cough and shortness of breath.    Cardiovascular:  Negative for chest pain, palpitations and leg swelling.   Gastrointestinal:  Positive for abdominal pain. Negative for heartburn, nausea and vomiting.   Skin:  Negative for rash.   Neurological:  Negative for dizziness, tingling and headaches.   Psychiatric/Behavioral:  Negative for depression.           Current Outpatient Medications:      "chlorhexidine (PERIDEX) 0.12 % solution, , Disp: , Rfl:     esomeprazole (NEXIUM) 40 MG capsule, Take 1 capsule (40 mg total) by mouth once daily., Disp: 90 capsule, Rfl: 3    multivitamin (THERAGRAN) per tablet, Take 1 tablet by mouth Daily., Disp: , Rfl:     dicyclomine (BENTYL) 20 mg tablet, Take 1 tablet (20 mg total) by mouth 3 (three) times daily as needed (abdominal pain)., Disp: 120 tablet, Rfl: 1    hyoscyamine (ANASPAZ,LEVSIN) 0.125 mg Tab, Take 1 tablet (125 mcg total) by mouth every 6 (six) hours as needed (abdominal pain)., Disp: 90 tablet, Rfl: 1    Lab Results   Component Value Date    HGBA1C 5.6 11/17/2015    HGBA1C 5.7 07/12/2013    HGBA1C 5.7 07/16/2012     No results found for: "MICALBCREAT"  Lab Results   Component Value Date    LDLCALC 115.8 03/07/2023    LDLCALC 110.8 03/30/2022    CHOL 232 (H) 03/07/2023    HDL 95 (H) 03/07/2023    TRIG 106 03/07/2023       Lab Results   Component Value Date     03/07/2023    K 4.8 03/07/2023     03/07/2023    CO2 28 03/07/2023     03/07/2023    BUN 12 03/07/2023    CREATININE 0.8 03/07/2023    CALCIUM 9.7 03/07/2023    PROT 7.3 03/07/2023    ALBUMIN 4.2 03/07/2023    BILITOT 0.8 03/07/2023    ALKPHOS 68 03/07/2023    AST 30 03/07/2023    ALT 20 03/07/2023    ANIONGAP 9 03/07/2023    ESTGFRAFRICA >60.0 10/15/2021    EGFRNONAA >60.0 10/15/2021    WBC 3.79 (L) 03/07/2023    HGB 14.1 03/07/2023    HGB 13.2 10/15/2021    HCT 46.5 03/07/2023    MCV 99 (H) 03/07/2023     03/07/2023    TSH 2.283 03/07/2023    PSA <0.01 09/24/2014    HEPCAB Negative 11/18/2020       Lab Results   Component Value Date    KDCQQXTY87SU 58 03/07/2023    KYQDORSD80MD 60 11/18/2020    KLACOBPN30 1047 (H) 11/18/2020    FERRITIN 188 06/16/2016    IRON 103 06/16/2016    TRANSFERRIN 244 06/16/2016    TIBC 361 06/16/2016    FESATURATED 29 06/16/2016         Past Medical History:   Diagnosis Date    Arthritis     Multiple thyroid nodules     Osteoporosis     Reflux     " "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        COVID test at [facility] on 9/11-GT     Social History     Social History Narrative    Not on file     Family History   Problem Relation Age of Onset    Cataracts Mother     Cancer Father         leukemia    Amblyopia Neg Hx     Blindness Neg Hx     Diabetes Neg Hx     Glaucoma Neg Hx     Hypertension Neg Hx     Macular degeneration Neg Hx     Retinal detachment Neg Hx     Strabismus Neg Hx     Stroke Neg Hx     Thyroid disease Neg Hx     Breast cancer Neg Hx     Colon cancer Neg Hx     Ovarian cancer Neg Hx     Celiac disease Neg Hx     Cirrhosis Neg Hx     Colon polyps Neg Hx     Crohn's disease Neg Hx     Esophageal cancer Neg Hx     Inflammatory bowel disease Neg Hx     Liver cancer Neg Hx     Rectal cancer Neg Hx     Liver disease Neg Hx     Stomach cancer Neg Hx     Ulcerative colitis Neg Hx     Pancreatic cancer Neg Hx      Vitals:    08/02/23 0932   BP: 121/64   Pulse: 98   Resp: 18   Temp: 98 °F (36.7 °C)   SpO2: 99%   Weight: 45.4 kg (100 lb 1.4 oz)   Height: 5' 4" (1.626 m)   PainSc:   4     Objective:   Physical Exam  Vitals reviewed.   Constitutional:       Appearance: Normal appearance.   HENT:      Head: Normocephalic.   Eyes:      Pupils: Pupils are equal, round, and reactive to light.   Cardiovascular:      Rate and Rhythm: Normal rate.      Pulses: Normal pulses.      Heart sounds: Normal heart sounds.   Pulmonary:      Effort: Pulmonary effort is normal.      Breath sounds: Normal breath sounds.   Abdominal:      General: Bowel sounds are normal.      Palpations: Abdomen is " soft.      Tenderness: There is abdominal tenderness.   Musculoskeletal:         General: Normal range of motion.   Skin:     General: Skin is warm.   Neurological:      General: No focal deficit present.      Mental Status: She is alert. Mental status is at baseline.   Psychiatric:         Mood and Affect: Mood normal.       Assessment/Plan     Marshall Almanza is a 73 y.o.female with:    Irritable bowel syndrome with both constipation and diarrhea  -     hyoscyamine (ANASPAZ,LEVSIN) 0.125 mg Tab; Take 1 tablet (125 mcg total) by mouth every 6 (six) hours as needed (abdominal pain).  Dispense: 90 tablet; Refill: 1  -     dicyclomine (BENTYL) 20 mg tablet; Take 1 tablet (20 mg total) by mouth 3 (three) times daily as needed (abdominal pain).  Dispense: 120 tablet; Refill: 1    Chronic LLQ pain  -     hyoscyamine (ANASPAZ,LEVSIN) 0.125 mg Tab; Take 1 tablet (125 mcg total) by mouth every 6 (six) hours as needed (abdominal pain).  Dispense: 90 tablet; Refill: 1  -     dicyclomine (BENTYL) 20 mg tablet; Take 1 tablet (20 mg total) by mouth 3 (three) times daily as needed (abdominal pain).  Dispense: 120 tablet; Refill: 1    Neck pain  -     Ambulatory referral/consult to Physical/Occupational Therapy; Future; Expected date: 08/09/2023    Shoulder pain, unspecified chronicity, unspecified laterality  -     Ambulatory referral/consult to Physical/Occupational Therapy; Future; Expected date: 08/09/2023         Chronic conditions status updated as per HPI.  Other than changes above, cont current medications and maintain follow up with     Sara Garay MD  [facility]    There are no Patient Instructions on file for this visit.  All of your core healthy metrics are met.

## 2023-08-04 ENCOUNTER — TELEPHONE (OUTPATIENT)
Dept: PRIMARY CARE CLINIC | Facility: CLINIC | Age: 73
End: 2023-08-04
Payer: MEDICARE

## 2023-08-04 NOTE — TELEPHONE ENCOUNTER
----- Message from Patricia Echols sent at 8/4/2023 10:54 AM CDT -----  Contact: [phone]  1MEDICALADVICE     Patient is calling for Medical Advice regarding: physical therapy     How long has patient had these symptoms: she states no one has called her for the appt     Pharmacy name and phone#:   [id number] [place]  [address]  [address] [place] [place] [address]  Phone: [phone] Fax: [phone]       Would like response via [employer]:  no    Comments: pt is calling she states she was told the therapy office would call her for an appt but she is following up she has not received a call yet

## 2023-08-04 NOTE — TELEPHONE ENCOUNTER
Spoke with pt to give her Physical Therapy number  [phone]    ----- Message from Jeanne Narayanan sent at 8/4/2023 11:12 AM CDT -----  Contact: [phone]  We do not schedule for Physical Therapy they schedule their own appointments you can give the pt their number [phone]    Thanks  ----- Message -----  From: Lakeisha Bowers MA  Sent: 8/4/2023  11:02 AM CDT  To: [facility] Referral Coordinator      ----- Message -----  From: Patricia Echols  Sent: 8/4/2023  10:56 AM CDT  To: Jarrod Ruiz Staff    1MEDICALADVICE     Patient is calling for Medical Advice regarding: physical therapy     How long has patient had these symptoms: she states no one has called her for the appt     Pharmacy name and phone#:   [id number] [place] - [address]  [address] [place] [place] [address]  Phone: [phone] Fax: [phone]       Would like response via [employer]:  no    Comments: pt is calling she states she was told the therapy office would call her for an appt but she is following up she has not received a call yet

## 2023-09-07 ENCOUNTER — TELEPHONE (OUTPATIENT)
Dept: ENDOSCOPY | Facility: HOSPITAL | Age: 73
End: 2023-09-07
Payer: MEDICARE

## 2023-09-07 NOTE — TELEPHONE ENCOUNTER
----- Message from Herminio Franco MD sent at 9/7/2023 10:54 AM CDT -----  Regarding: RE: pt jayne  Contact: [phone]  Daniela - can we get her in with GI NP or MD soonest.  ----- Message -----  From: Martha Herrera MA  Sent: 9/7/2023  10:29 AM CDT  To: Herminio Franco MD  Subject: FW: pt adivce                                    You do not have anything sooner. Do you want to do virtual?  ----- Message -----  From: Beth Roman  Sent: 9/7/2023  10:20 AM CDT  To: Salvador GROSSMAN Staff  Subject: pt adivce                                        Pt has an appt scheduled 10/30/23 , pt needing something sooner due to being in pain. Pls calll

## 2023-09-11 ENCOUNTER — CLINICAL SUPPORT (OUTPATIENT)
Dept: REHABILITATION | Facility: HOSPITAL | Age: 73
End: 2023-09-11
Payer: MEDICARE

## 2023-09-11 DIAGNOSIS — M25.519 SHOULDER PAIN, UNSPECIFIED CHRONICITY, UNSPECIFIED LATERALITY: ICD-10-CM

## 2023-09-11 DIAGNOSIS — M54.2 NECK PAIN: ICD-10-CM

## 2023-09-11 DIAGNOSIS — R29.898 DECREASED RANGE OF MOTION OF NECK: ICD-10-CM

## 2023-09-11 DIAGNOSIS — R29.3 POOR POSTURE: ICD-10-CM

## 2023-09-11 PROCEDURE — 97161 PT EVAL LOW COMPLEX 20 MIN: CPT

## 2023-09-13 PROBLEM — R29.3 POOR POSTURE: Status: ACTIVE | Noted: 2023-09-13

## 2023-09-13 PROBLEM — R29.898 DECREASED RANGE OF MOTION OF NECK: Status: ACTIVE | Noted: 2023-09-13

## 2023-09-14 NOTE — PLAN OF CARE
[facility]   Physical Therapy Initial Evaluation      Name: Marshall Almanza  Clinic Number: [id number]    Therapy Diagnosis:   Encounter Diagnoses   Name Primary?    Neck pain     Shoulder pain, unspecified chronicity, unspecified laterality         Physician: Sara Garay MD    Physician Orders: PT Eval and Treat   Medical Diagnosis from Referral:   M54.2 (ICD-10-CM) - Neck pain   M25.519 (ICD-10-CM) - Shoulder pain, unspecified chronicity, unspecified laterality   Evaluation Date: 9/11/2023  Authorization Period Expiration: 8/1/2024  Plan of Care Expiration: 11/11/2023  Progress Note Due: 10/11/2023  Visit # / Visits authorized: 1/ 1 (pending)   FOTO: 1/ 3    Precautions: Standard, Osteoporosis     Time In: 8:55 AM  Time Out: 9:33 AM  Total Billable Time: 38 minutes    Subjective   Date of onset: ~2 years ago    History of current condition - Marshall reports insidious onset of neck pain that began about 2 years ago. She states the pain has progressively worsened in the last few months. She describes neck pain as achy and stiffness. She states sometimes the pain radiates into B UT/shoulders. She denies any UE radicular symptoms. Aggravating factors include lying on side when trying to find comfortable position to sleep, turning head Left and Right. She reports she has occasional headaches about once a week.    Falls: no     Imaging: see imaging section    Prior Therapy: yes for back and hand; none for neck   Social History: lives alone   Occupation: retired   Prior Level of Function: IND  Current Level of Function: IND    Pain:  Current 6/10, worst 6/10, best 1/10   Location: neck   Description: Aching and Tight  Aggravating Factors: lying down, turning head  Easing Factors: position change    Patients goals: decrease the pain; learn what to do to manage/prevent symptoms      Medical History:   Past Medical History:   Diagnosis Date    Arthritis     Multiple thyroid nodules      Osteoporosis     Reflux     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    Medications:   Marshall has a current medication list which includes the following prescription(s): chlorhexidine, dicyclomine, esomeprazole, hyoscyamine, and multivitamin.    Allergies:   Review of patient's allergies indicates:   Allergen Reactions    Ciprofloxacin      palpitations    Flagyl [metronidazole hcl]      Cause urinary frequency      Objective      Posture: rounded shoulders, increased thoracic kyphosis     Cervical Range of Motion: (Active Range of Motion)   % Limitation Pain Goals   Flexion WNL    Full and Pain free   Extension WNL     Full and Pain free   Right Rotation 45 deg    70 deg and Pain free   Left Rotation 42 deg    70 deg and Pain free   Right Side Bending 30 deg  Full and Pain free   Left Side Bending 25 deg   Full and Pain free      Shoulder Range of Motion:   Shoulder Left Right Goals   Flexion WNL  degrees   Abduction WNL  degrees     Upper Extremity Strength:  (R) UE  (L) UE  Goals   Shoulder flexion: 4+/5 Shoulder flexion: 4+/5 5/5   Shoulder Abduction: 4+/5 Shoulder abduction: 4+/5 5/5   Shoulder ER 4+/5 Shoulder ER 4+/5 5/5   Shoulder IR 5/5 Shoulder IR 5/5 5/5     Joint Mobility: hypomobile C2-C7    Thoracic mobility: moderate loss thoracic extension                                  Moderate loss thoracic rotation bilaterally    Palpation: no tenderness noted with palpation        Intake Outcome Measure for FOTO Shoulder Survey    Therapist reviewed FOTO scores for Marshall Almanza on 9/11/2023.   FOTO report - see Media section or FOTO account episode details.    Intake Score: 66% functional ability          Treatment     Total Treatment time (time-based codes) separate from Evaluation: 5 minutes     Marshall received the treatments listed below:      therapeutic exercises to develop  strength, ROM, and posture for 5 minutes including:    Education HEP  Seated chin tucks  Seated scap retraction  TB B ER    Patient Education and Home Exercises     Education provided:   - Anatomy and diagnosis  - PT plan of care  - HEP    Written Home Exercises Provided: yes. Exercises were reviewed and Marshall was able to demonstrate them prior to the end of the session.  Marshall demonstrated good  understanding of the education provided. See EMR under Patient Instructions for exercises provided during therapy sessions.    Assessment   Marshall is a 73 y.o. female referred to outpatient Physical Therapy with a medical diagnosis of M54.2 (ICD-10-CM) - Neck pain, M25.519 (ICD-10-CM) - Shoulder pain, unspecified chronicity, unspecified laterality. Upon physical assessment, patient demonstrates decreased cervical and thoracic range of motion, mild B shoulder weakness, and poor posture. Patient prognosis is Good. Patient will benefit from skilled outpatient Physical Therapy to address the deficits stated above and in the chart below, provide patient education, and to maximize patient's quality of life.     Plan of care discussed with patient: Yes  Patient's spiritual, cultural and educational needs considered and patient is agreeable to the plan of care and goals as stated below:     Anticipated Barriers for therapy: none    Medical Necessity is demonstrated by the following  History  Co-morbidities and personal factors that may impact the plan of care [] LOW: no personal factors / co-morbidities  [x] MODERATE: 1-2 personal factors / co-morbidities  [] HIGH: 3+ personal factors / co-morbidities    Moderate / High Support Documentation:   Co-morbidities affecting plan of care: osteoporosis    Personal Factors:   age     Examination  Body Structures and Functions, activity limitations and participation restrictions that may impact the plan of care [] LOW: addressing 1-2 elements  [x] MODERATE: 3+ elements  [] HIGH: 4+  elements (please support below)    Moderate / High Support Documentation: cervical ROM, shoulder weakness, posture     Clinical Presentation [x] LOW: stable  [] MODERATE: Evolving  [] HIGH: Unstable     Decision Making/ Complexity Score: low       Goals:    SHORT TERM GOALS:  4 weeks 9/11/23   Patient will be independent with HEP in order to assist therapy in return to maximal function.    Pain rating at Worst: 3/10 or less for improved tolerance to sleeping.     Patient will demonstrate cervical rotation AROM at least 55 deg bilaterally for improved tolerance to turning her head while driving.       LONG TERM GOALS: 8 weeks 9/11/23   Patient will demonstrate cervical rotation AROM 70 deg bilaterally to turn her head while driving with no difficulty.     Patient will demonstrate B shoulder strength 5/5 for improved stability with overhead reaching.     Pain Rating at Worst: 1/10 to improve overall Quality of Life.     Patient will meet predicted functional outcome (FOTO) score: 75% functional ability or greater to increase perceived functional ability.    Patient will have met personal goal of: sleeping without disturbance due to neck symptoms.      Plan   Plan of care Certification: 9/11/2023 to 11/11/2023.    Outpatient Physical Therapy 1 time weekly for 6-8 weeks to include the following interventions: Manual Therapy, Moist Heat/ Ice, Neuromuscular Re-ed, Patient Education, Therapeutic Activities, and Therapeutic Exercise.     Mary Jane Austin PT      Physician's Signature: _________________________________________ Date: ________________

## 2023-09-18 ENCOUNTER — CLINICAL SUPPORT (OUTPATIENT)
Dept: REHABILITATION | Facility: HOSPITAL | Age: 73
End: 2023-09-18
Payer: MEDICARE

## 2023-09-18 DIAGNOSIS — R29.898 DECREASED RANGE OF MOTION OF NECK: ICD-10-CM

## 2023-09-18 DIAGNOSIS — R29.3 POOR POSTURE: Primary | ICD-10-CM

## 2023-09-18 PROCEDURE — 97110 THERAPEUTIC EXERCISES: CPT | Mod: CQ

## 2023-09-18 PROCEDURE — 97140 MANUAL THERAPY 1/> REGIONS: CPT | Mod: CQ

## 2023-09-18 NOTE — PROGRESS NOTES
[facility]   Physical Therapy Treatment Note      Name: Marshall Almanza  Clinic Number: [id number]    Therapy Diagnosis:   Encounter Diagnoses   Name Primary?    Poor posture Yes    Decreased range of motion of neck      Physician: Sara Garay MD    Visit Date: 9/18/2023    Physician Orders: PT Eval and Treat   Medical Diagnosis from Referral:   M54.2 (ICD-10-CM) - Neck pain   M25.519 (ICD-10-CM) - Shoulder pain, unspecified chronicity, unspecified laterality   Evaluation Date: 9/11/2023  Authorization Period Expiration: 8/1/2024  Plan of Care Expiration: 11/11/2023  Progress Note Due: 10/11/2023  Visit # / Visits authorized: 1/1; 1/40   FOTO: 1/ 3     Precautions: Standard, Osteoporosis      PTA Visit #: 1/5     Time In: 2:45 PM   Time Out: 3:30 PM  Total Billable Time: 45 minutes    Subjective     Pt reports stiffness and discomfort throughout cervical spine upon entering Physical Therapy treatment today.    She was compliant with home exercise program.  Response to previous treatment: initial eval   Functional change: none     Pain: 7/10  Location: bilateral shoulders and neck     Objective      Objective Measures updated at progress report unless specified.     Treatment     Marshall received the treatments listed below:      Cervical rotation limited bilateral     therapeutic exercises to develop strength, ROM, and flexibility for 37 minutes including:    +bilateral Upper Trap stretching 3 x 30 second hold each   +bilateral Levator Scap stretching 3 x 30 second hold each   +red thera band rows 2 x 10   +red thera band shoulder extension 2 x 10   +red thera band bilateral shoulder external rotation 2 x 10   +seated chin tucks 3 x 10   +towel snag with cervical rotation x 10 each side   +standing trunk rotation with cervical rotation 2 x 10 each 2 second hold each     manual therapy techniques: Soft tissue Mobilization were applied to the: cervical spine and bilateral Upper Trap for 8  minutes, including:    Soft Tissue Massage       Patient Education and Home Exercises       Education provided:   - Anatomy and diagnosis  - PT plan of care  - HEP     Written Home Exercises Provided: yes. Exercises were reviewed and Marshall was able to demonstrate them prior to the end of the session.  Marshall demonstrated good  understanding of the education provided. See EMR under Patient Instructions for exercises provided during therapy sessions.    Assessment     Patient with increased tightness throughout bilateral Upper Trap stretching. Patient also with significant tightness throughout cervical spine and bilateral Upper Trap throughout Soft Tissue Massage, however, patient with some relief and relaxation towards end of massage treatment. Patient also with tightness throughout standing trunk rotation with cervical rotation.    Marshall Is progressing well towards her goals.   Pt prognosis is Good.     Pt will continue to benefit from skilled outpatient physical therapy to address the deficits listed in the problem list box on initial evaluation, provide pt/family education and to maximize pt's level of independence in the home and community environment.     Pt's spiritual, cultural and educational needs considered and pt agreeable to plan of care and goals.     Anticipated barriers to physical therapy: none     Goals:   SHORT TERM GOALS:  4 weeks 9/11/23   Patient will be independent with HEP in order to assist therapy in return to maximal function.     Pain rating at Worst: 3/10 or less for improved tolerance to sleeping.      Patient will demonstrate cervical rotation AROM at least 55 deg bilaterally for improved tolerance to turning her head while driving.         LONG TERM GOALS: 8 weeks 9/11/23   Patient will demonstrate cervical rotation AROM 70 deg bilaterally to turn her head while driving with no difficulty.      Patient will demonstrate B shoulder strength 5/5 for improved stability with overhead  reaching.      Pain Rating at Worst: 1/10 to improve overall Quality of Life.      Patient will meet predicted functional outcome (FOTO) score: 75% functional ability or greater to increase perceived functional ability.     Patient will have met personal goal of: sleeping without disturbance due to neck symptoms.         Plan     Continue with Physical Therapist Plan of Care.    Chidi Damon, PTA

## 2023-10-02 ENCOUNTER — CLINICAL SUPPORT (OUTPATIENT)
Dept: REHABILITATION | Facility: HOSPITAL | Age: 73
End: 2023-10-02
Payer: MEDICARE

## 2023-10-02 ENCOUNTER — PATIENT OUTREACH (OUTPATIENT)
Dept: ADMINISTRATIVE | Facility: HOSPITAL | Age: 73
End: 2023-10-02
Payer: MEDICARE

## 2023-10-02 DIAGNOSIS — R29.898 DECREASED RANGE OF MOTION OF NECK: ICD-10-CM

## 2023-10-02 DIAGNOSIS — R29.3 POOR POSTURE: Primary | ICD-10-CM

## 2023-10-02 PROCEDURE — 97110 THERAPEUTIC EXERCISES: CPT

## 2023-10-02 PROCEDURE — 97140 MANUAL THERAPY 1/> REGIONS: CPT

## 2023-10-02 NOTE — PROGRESS NOTES
Patient due for the following    High Dose Statin     Shingles Vaccine (1 of 2)    Pneumococcal Vaccines (Age 65+) (1 - PCV)    COVID-19 Vaccine (4 - Pfizer series)    TETANUS VACCINE     Influenza Vaccine (1)    DEXA Scan       Immunizations: reviewed and updated  Care Everywhere: triggered  Care Teams: up to date  Outreach: none needed

## 2023-10-06 NOTE — PROGRESS NOTES
[facility]   Physical Therapy Treatment Note      Name: Marshall Almanza  Clinic Number: [id number]    Therapy Diagnosis:   Encounter Diagnoses   Name Primary?    Poor posture Yes    Decreased range of motion of neck      Physician: Sara Garay MD    Visit Date: 10/9/2023    Physician Orders: PT Eval and Treat   Medical Diagnosis from Referral:   M54.2 (ICD-10-CM) - Neck pain   M25.519 (ICD-10-CM) - Shoulder pain, unspecified chronicity, unspecified laterality   Evaluation Date: 9/11/2023  Authorization Period Expiration: 8/1/2024  Plan of Care Expiration: 11/11/2023  Progress Note Due: 10/11/2023    Visit # / Visits authorized: 1/1; 3/40   FOTO: 1/ 3     Precautions: Standard, Osteoporosis        PTA Visit #: 1/5     Time In: 8:45 AM   Time Out: 9:25 AM   Total Billable Time: 40 minutes    Subjective     Pt reports stiffness and discomfort throughout cervical spine upon entering Physical Therapy treatment today.     She was compliant with home exercise program.  Response to previous treatment: initial eval   Functional change: none      Pain: 7/10  Location: bilateral shoulders and neck     Objective      Objective Measures updated at progress report unless specified.     Treatment     Marshall received the treatments listed below:      therapeutic exercises to develop strength, ROM, and flexibility for 32 minutes including:     +UBE 3'/3'  bilateral Upper Trap stretching 3 x 30 second hold each   bilateral Levator Scap stretching 3 x 30 second hold each   red thera band rows 2 x 10   red thera band shoulder extension 2 x 10   red thera band bilateral shoulder external rotation 2 x 10   seated chin tucks 3 x 10   towel snag with cervical rotation x 10 each side   standing open books 2 x 10 each 2 second hold each      manual therapy techniques: Soft tissue Mobilization were applied to the: cervical spine and bilateral Upper Trap for 8 minutes, including:     Soft Tissue Massage     Patient  Education and Home Exercises       Education provided:   - Anatomy and diagnosis  - PT plan of care  - HEP     Written Home Exercises Provided: yes. Exercises were reviewed and Marshall was able to demonstrate them prior to the end of the session.  Marshall demonstrated good  understanding of the education provided. See EMR under Patient Instructions for exercises provided during therapy sessions.    Assessment     Patient again able to complete therapeutic exercise without reports of increased or reproduced pain throughout or after Physical Therapy treatment. Patient required verbal cues throughout thera band therapeutic exercise for slow performance due to patient performing tasks fast, however, after cues patient able to complete therapeutic exercise properly. Patient still with some tightness throughout both side of cervical spine throughout Soft Tissue Massage, however, patient without c/o of shooting pain or reproduced pain.     Marshall Is progressing well towards her goals.   Pt prognosis is Good.     Pt will continue to benefit from skilled outpatient physical therapy to address the deficits listed in the problem list box on initial evaluation, provide pt/family education and to maximize pt's level of independence in the home and community environment.     Pt's spiritual, cultural and educational needs considered and pt agreeable to plan of care and goals.     Anticipated barriers to physical therapy:  none      Goals:   SHORT TERM GOALS:  4 weeks 9/11/23   Patient will be independent with HEP in order to assist therapy in return to maximal function.     Pain rating at Worst: 3/10 or less for improved tolerance to sleeping.      Patient will demonstrate cervical rotation AROM at least 55 deg bilaterally for improved tolerance to turning her head while driving.         LONG TERM GOALS: 8 weeks 9/11/23   Patient will demonstrate cervical rotation AROM 70 deg bilaterally to turn her head while driving with no  difficulty.      Patient will demonstrate B shoulder strength 5/5 for improved stability with overhead reaching.      Pain Rating at Worst: 1/10 to improve overall Quality of Life.      Patient will meet predicted functional outcome (FOTO) score: 75% functional ability or greater to increase perceived functional ability.     Patient will have met personal goal of: sleeping without disturbance due to neck symptoms.      Plan     Continue with Physical Therapist Plan of Care.     Chidi Damon, PTA

## 2023-10-09 ENCOUNTER — CLINICAL SUPPORT (OUTPATIENT)
Dept: REHABILITATION | Facility: HOSPITAL | Age: 73
End: 2023-10-09
Payer: MEDICARE

## 2023-10-09 ENCOUNTER — DOCUMENTATION ONLY (OUTPATIENT)
Dept: REHABILITATION | Facility: HOSPITAL | Age: 73
End: 2023-10-09
Payer: MEDICARE

## 2023-10-09 DIAGNOSIS — R29.898 DECREASED RANGE OF MOTION OF NECK: ICD-10-CM

## 2023-10-09 DIAGNOSIS — R29.3 POOR POSTURE: Primary | ICD-10-CM

## 2023-10-09 PROCEDURE — 97110 THERAPEUTIC EXERCISES: CPT | Mod: CQ

## 2023-10-09 PROCEDURE — 97140 MANUAL THERAPY 1/> REGIONS: CPT | Mod: CQ

## 2023-10-09 NOTE — PROGRESS NOTES
[facility]   Physical Therapy Treatment Note      Name: Marshall Almanza  Clinic Number: [id number]    Therapy Diagnosis:   Encounter Diagnoses   Name Primary?    Poor posture Yes    Decreased range of motion of neck      Physician: Sara Garay MD    Visit Date: 10/2/2023    Physician Orders: PT Eval and Treat   Medical Diagnosis from Referral:   M54.2 (ICD-10-CM) - Neck pain   M25.519 (ICD-10-CM) - Shoulder pain, unspecified chronicity, unspecified laterality   Evaluation Date: 9/11/2023  Authorization Period Expiration: 8/1/2024  Plan of Care Expiration: 11/11/2023  Progress Note Due: 10/11/2023  Visit # / Visits authorized: 1/1; 1/40   FOTO: 1/ 3     Precautions: Standard, Osteoporosis      PTA Visit #: 1/5     Time In: 08:45 PM   Time Out: 9:30 PM  Total Billable Time: 45 minutes    Subjective     Pt reports stiffness and discomfort throughout cervical spine upon entering Physical Therapy treatment today.    She was compliant with home exercise program.  Response to previous treatment: initial eval   Functional change: none     Pain: 7/10  Location: bilateral shoulders and neck     Objective      Objective Measures updated at progress report unless specified.     Treatment     Marshall received the treatments listed below:      Cervical rotation limited bilateral     therapeutic exercises to develop strength, ROM, and flexibility for 37 minutes including:    +bilateral Upper Trap stretching 3 x 30 second hold each   +bilateral Levator Scap stretching 3 x 30 second hold each   +red thera band rows 2 x 10   +red thera band shoulder extension 2 x 10   +red thera band bilateral shoulder external rotation 2 x 10   +seated chin tucks 3 x 10   +towel snag with cervical rotation x 10 each side   +standing trunk rotation with cervical rotation 2 x 10 each 2 second hold each     manual therapy techniques: Soft tissue Mobilization were applied to the: cervical spine and bilateral Upper Trap for 8  minutes, including:    Soft Tissue Massage       Patient Education and Home Exercises       Education provided:   - Anatomy and diagnosis  - PT plan of care  - HEP     Written Home Exercises Provided: yes. Exercises were reviewed and Marshall was able to demonstrate them prior to the end of the session.  Marshall demonstrated good  understanding of the education provided. See EMR under Patient Instructions for exercises provided during therapy sessions.    Assessment     Patient with increased tightness throughout bilateral Upper Trap stretching. Patient also with significant tightness throughout cervical spine and bilateral Upper Trap throughout Soft Tissue Massage, however, patient with some relief and relaxation towards end of massage treatment. Patient also with tightness throughout standing trunk rotation with cervical rotation.    Marshall Is progressing well towards her goals.   Pt prognosis is Good.     Pt will continue to benefit from skilled outpatient physical therapy to address the deficits listed in the problem list box on initial evaluation, provide pt/family education and to maximize pt's level of independence in the home and community environment.     Pt's spiritual, cultural and educational needs considered and pt agreeable to plan of care and goals.     Anticipated barriers to physical therapy: none     Goals:   SHORT TERM GOALS:  4 weeks 9/11/23   Patient will be independent with HEP in order to assist therapy in return to maximal function.     Pain rating at Worst: 3/10 or less for improved tolerance to sleeping.      Patient will demonstrate cervical rotation AROM at least 55 deg bilaterally for improved tolerance to turning her head while driving.         LONG TERM GOALS: 8 weeks 9/11/23   Patient will demonstrate cervical rotation AROM 70 deg bilaterally to turn her head while driving with no difficulty.      Patient will demonstrate B shoulder strength 5/5 for improved stability with overhead  reaching.      Pain Rating at Worst: 1/10 to improve overall Quality of Life.      Patient will meet predicted functional outcome (FOTO) score: 75% functional ability or greater to increase perceived functional ability.     Patient will have met personal goal of: sleeping without disturbance due to neck symptoms.         Plan     Continue with Physical Therapist Plan of Care.    Polo Donald, PT

## 2023-10-09 NOTE — PROGRESS NOTES
PT/PTA met face to face to discuss pt's treatment plan and progress towards established goals. Pt will be seen by a physical therapist minimally every 6th visit or every 30 days.    Chidi Damon PTA

## 2023-10-16 ENCOUNTER — CLINICAL SUPPORT (OUTPATIENT)
Dept: REHABILITATION | Facility: HOSPITAL | Age: 73
End: 2023-10-16
Payer: MEDICARE

## 2023-10-16 DIAGNOSIS — R29.898 DECREASED RANGE OF MOTION OF NECK: ICD-10-CM

## 2023-10-16 DIAGNOSIS — R29.3 POOR POSTURE: Primary | ICD-10-CM

## 2023-10-16 PROCEDURE — 97140 MANUAL THERAPY 1/> REGIONS: CPT

## 2023-10-16 PROCEDURE — 97110 THERAPEUTIC EXERCISES: CPT

## 2023-10-23 ENCOUNTER — CLINICAL SUPPORT (OUTPATIENT)
Dept: REHABILITATION | Facility: HOSPITAL | Age: 73
End: 2023-10-23
Payer: MEDICARE

## 2023-10-23 DIAGNOSIS — R29.898 DECREASED RANGE OF MOTION OF NECK: ICD-10-CM

## 2023-10-23 DIAGNOSIS — R29.3 POOR POSTURE: Primary | ICD-10-CM

## 2023-10-23 PROCEDURE — 97110 THERAPEUTIC EXERCISES: CPT

## 2023-10-23 NOTE — PROGRESS NOTES
[facility]   Physical Therapy Treatment Note      Name: Marshall Almanza  Clinic Number: [id number]    Therapy Diagnosis:   Encounter Diagnoses   Name Primary?    Poor posture Yes    Decreased range of motion of neck        Physician: Sara Garay MD    Visit Date: 10/23/2023    Physician Orders: PT Eval and Treat   Medical Diagnosis from Referral:   M54.2 (ICD-10-CM) - Neck pain   M25.519 (ICD-10-CM) - Shoulder pain, unspecified chronicity, unspecified laterality   Evaluation Date: 9/11/2023  Authorization Period Expiration: 8/1/2024  Plan of Care Expiration: 11/11/2023  Progress Note Due: 10/11/2023    Visit # / Visits authorized: 1/1; 5/40   FOTO: 1/ 3     Precautions: Standard, Osteoporosis        PTA Visit #: 1/5     Time In: 8:45 AM   Time Out: 9:25 AM   Total Billable Time: 40 minutes    Subjective     Pt reports no neck pain today, just stiffness. Patient reports feeling 50% better than when she started.     She was compliant with home exercise program.  Response to previous treatment: initial eval   Functional change: none      Pain: 0/10  Location: bilateral shoulders and neck     Objective      10/23/2023:  Cervical Range of Motion: (Active Range of Motion)    % Limitation Goals   Flexion WNL Full and Pain free   Extension WNL  Full and Pain free   Right Rotation 57 deg 70 deg and Pain free   Left Rotation 60 deg 70 deg and Pain free   Right Side Bending 30 deg Full and Pain free   Left Side Bending 30 deg  Full and Pain free       Treatment     Marshall received the treatments listed below:      therapeutic exercises to develop strength, ROM, and flexibility for 40 minutes including:     UBE 2'/2'  bilateral Upper Trap stretching 3 x 30 second hold each   bilateral Levator Scap stretching 3 x 30 second hold each   No [place] red thera band 20x  Sidelying open books 10x bilaterally  Seated thoracic extension with foam roll behind back 10x  Red thera band mid trap at wall 2x10  Wall  slide with pillowcase for external rotation 20x  red thera band rows 2 x 10   red thera band shoulder extension 2 x 10     NT: (poor performance despite cueing)  seated chin tucks 3 x 10   towel snag with cervical rotation x 10 each side      manual therapy techniques: Soft tissue Mobilization were applied to the: cervical spine and bilateral Upper Trap for 5 minutes, including:     Jt assessment    Patient Education and Home Exercises       Education provided:   - Anatomy and diagnosis  - PT plan of care  - HEP     Written Home Exercises Provided: yes. Exercises were reviewed and Marshall was able to demonstrate them prior to the end of the session.  Marshall demonstrated good  understanding of the education provided. See EMR under Patient Instructions for exercises provided during therapy sessions.    Assessment   Patient reassessed today and is demonstrating improvements in cervical range of motion and no pain reproduction with movements previously found. Patient still with range of motion deficits, but is progressing well. Plan to continue to progress as tolerated.    Marshall Is progressing well towards her goals.   Pt prognosis is Good.     Pt will continue to benefit from skilled outpatient physical therapy to address the deficits listed in the problem list box on initial evaluation, provide pt/family education and to maximize pt's level of independence in the home and community environment.     Pt's spiritual, cultural and educational needs considered and pt agreeable to plan of care and goals.     Anticipated barriers to physical therapy:  none      Goals:   SHORT TERM GOALS:  4 weeks 9/11/23   Patient will be independent with HEP in order to assist therapy in return to maximal function.     Pain rating at Worst: 3/10 or less for improved tolerance to sleeping.      Patient will demonstrate cervical rotation AROM at least 55 deg bilaterally for improved tolerance to turning her head while driving.          LONG TERM GOALS: 8 weeks 9/11/23   Patient will demonstrate cervical rotation AROM 70 deg bilaterally to turn her head while driving with no difficulty.      Patient will demonstrate B shoulder strength 5/5 for improved stability with overhead reaching.      Pain Rating at Worst: 1/10 to improve overall Quality of Life.      Patient will meet predicted functional outcome (FOTO) score: 75% functional ability or greater to increase perceived functional ability.     Patient will have met personal goal of: sleeping without disturbance due to neck symptoms.      Plan     Continue with Physical Therapist Plan of Care.     Ryan Hernandez, PT

## 2023-10-23 NOTE — PROGRESS NOTES
[facility]   Physical Therapy Treatment Note      Name: Marshall Almanza  Clinic Number: [id number]    Therapy Diagnosis:   Encounter Diagnoses   Name Primary?    Poor posture Yes    Decreased range of motion of neck      Physician: Sara Garay MD    Visit Date: 10/16/2023    Physician Orders: PT Eval and Treat   Medical Diagnosis from Referral:   M54.2 (ICD-10-CM) - Neck pain   M25.519 (ICD-10-CM) - Shoulder pain, unspecified chronicity, unspecified laterality   Evaluation Date: 9/11/2023  Authorization Period Expiration: 8/1/2024  Plan of Care Expiration: 11/11/2023  Progress Note Due: 10/11/2023    Visit # / Visits authorized: 1/1; 3/40   FOTO: 1/ 3     Precautions: Standard, Osteoporosis        PTA Visit #: 1/5     Time In: 8:45 AM   Time Out: 9:25 AM   Total Billable Time: 40 minutes    Subjective     Pt reports stiffness and discomfort throughout cervical spine upon entering Physical Therapy treatment today.     She was compliant with home exercise program.  Response to previous treatment: initial eval   Functional change: none      Pain: 7/10  Location: bilateral shoulders and neck     Objective      Objective Measures updated at progress report unless specified.     Treatment     Marshall received the treatments listed below:      therapeutic exercises to develop strength, ROM, and flexibility for 30 minutes including:     +UBE 3'/3'  bilateral Upper Trap stretching 3 x 30 second hold each   bilateral Levator Scap stretching 3 x 30 second hold each   red thera band rows 2 x 10   red thera band shoulder extension 2 x 10   red thera band bilateral shoulder external rotation 2 x 10   seated chin tucks 3 x 10   towel snag with cervical rotation x 10 each side   standing open books 2 x 10 each 2 second hold each      manual therapy techniques: Soft tissue Mobilization were applied to the: cervical spine and bilateral Upper Trap for 15 minutes, including:     Soft Tissue Massage     Patient  Education and Home Exercises       Education provided:   - Anatomy and diagnosis  - PT plan of care  - HEP     Written Home Exercises Provided: yes. Exercises were reviewed and Marshall was able to demonstrate them prior to the end of the session.  Marshall demonstrated good  understanding of the education provided. See EMR under Patient Instructions for exercises provided during therapy sessions.    Assessment     Patient again able to complete therapeutic exercise without reports of increased or reproduced pain throughout or after Physical Therapy treatment. Patient required verbal cues throughout thera band therapeutic exercise for slow performance due to patient performing tasks fast, however, after cues patient able to complete therapeutic exercise properly. Patient still with some tightness throughout both side of cervical spine throughout Soft Tissue Massage, however, patient without c/o of shooting pain or reproduced pain.     Marshall Is progressing well towards her goals.   Pt prognosis is Good.     Pt will continue to benefit from skilled outpatient physical therapy to address the deficits listed in the problem list box on initial evaluation, provide pt/family education and to maximize pt's level of independence in the home and community environment.     Pt's spiritual, cultural and educational needs considered and pt agreeable to plan of care and goals.     Anticipated barriers to physical therapy:  none      Goals:   SHORT TERM GOALS:  4 weeks 9/11/23   Patient will be independent with HEP in order to assist therapy in return to maximal function.     Pain rating at Worst: 3/10 or less for improved tolerance to sleeping.      Patient will demonstrate cervical rotation AROM at least 55 deg bilaterally for improved tolerance to turning her head while driving.         LONG TERM GOALS: 8 weeks 9/11/23   Patient will demonstrate cervical rotation AROM 70 deg bilaterally to turn her head while driving with no  difficulty.      Patient will demonstrate B shoulder strength 5/5 for improved stability with overhead reaching.      Pain Rating at Worst: 1/10 to improve overall Quality of Life.      Patient will meet predicted functional outcome (FOTO) score: 75% functional ability or greater to increase perceived functional ability.     Patient will have met personal goal of: sleeping without disturbance due to neck symptoms.      Plan     Continue with Physical Therapist Plan of Care.     Polo Donald, PT

## 2023-10-30 ENCOUNTER — OFFICE VISIT (OUTPATIENT)
Dept: GASTROENTEROLOGY | Facility: CLINIC | Age: 73
End: 2023-10-30
Payer: MEDICARE

## 2023-10-30 VITALS
HEART RATE: 87 BPM | SYSTOLIC BLOOD PRESSURE: 126 MMHG | BODY MASS INDEX: 17.16 KG/M2 | HEIGHT: 64 IN | DIASTOLIC BLOOD PRESSURE: 80 MMHG | WEIGHT: 100.5 LBS

## 2023-10-30 DIAGNOSIS — K63.5 SERRATED POLYP OF COLON: Primary | ICD-10-CM

## 2023-10-30 DIAGNOSIS — Z79.899 ENCOUNTER FOR MONITORING LONG-TERM PROTON PUMP INHIBITOR THERAPY: ICD-10-CM

## 2023-10-30 DIAGNOSIS — R14.0 POSTPRANDIAL ABDOMINAL BLOATING: ICD-10-CM

## 2023-10-30 DIAGNOSIS — R10.32 LEFT LOWER QUADRANT PAIN: ICD-10-CM

## 2023-10-30 DIAGNOSIS — Z51.81 ENCOUNTER FOR MONITORING LONG-TERM PROTON PUMP INHIBITOR THERAPY: ICD-10-CM

## 2023-10-30 PROCEDURE — 1160F PR REVIEW ALL MEDS BY PRESCRIBER/CLIN PHARMACIST DOCUMENTED: ICD-10-PCS | Mod: HCNC,CPTII,S$GLB, | Performed by: INTERNAL MEDICINE

## 2023-10-30 PROCEDURE — 3074F PR MOST RECENT SYSTOLIC BLOOD PRESSURE < 130 MM HG: ICD-10-PCS | Mod: HCNC,CPTII,S$GLB, | Performed by: INTERNAL MEDICINE

## 2023-10-30 PROCEDURE — 3079F PR MOST RECENT DIASTOLIC BLOOD PRESSURE 80-89 MM HG: ICD-10-PCS | Mod: HCNC,CPTII,S$GLB, | Performed by: INTERNAL MEDICINE

## 2023-10-30 PROCEDURE — 1101F PT FALLS ASSESS-DOCD LE1/YR: CPT | Mod: HCNC,CPTII,S$GLB, | Performed by: INTERNAL MEDICINE

## 2023-10-30 PROCEDURE — 1101F PR PT FALLS ASSESS DOC 0-1 FALLS W/OUT INJ PAST YR: ICD-10-PCS | Mod: HCNC,CPTII,S$GLB, | Performed by: INTERNAL MEDICINE

## 2023-10-30 PROCEDURE — 3288F FALL RISK ASSESSMENT DOCD: CPT | Mod: HCNC,CPTII,S$GLB, | Performed by: INTERNAL MEDICINE

## 2023-10-30 PROCEDURE — 3288F PR FALLS RISK ASSESSMENT DOCUMENTED: ICD-10-PCS | Mod: HCNC,CPTII,S$GLB, | Performed by: INTERNAL MEDICINE

## 2023-10-30 PROCEDURE — 1160F RVW MEDS BY RX/DR IN RCRD: CPT | Mod: HCNC,CPTII,S$GLB, | Performed by: INTERNAL MEDICINE

## 2023-10-30 PROCEDURE — 3079F DIAST BP 80-89 MM HG: CPT | Mod: HCNC,CPTII,S$GLB, | Performed by: INTERNAL MEDICINE

## 2023-10-30 PROCEDURE — 99999 PR PBB SHADOW E&M-EST. PATIENT-LVL III: CPT | Mod: PBBFAC,HCNC,, | Performed by: INTERNAL MEDICINE

## 2023-10-30 PROCEDURE — 1125F PR PAIN SEVERITY QUANTIFIED, PAIN PRESENT: ICD-10-PCS | Mod: HCNC,CPTII,S$GLB, | Performed by: INTERNAL MEDICINE

## 2023-10-30 PROCEDURE — 1159F MED LIST DOCD IN RCRD: CPT | Mod: HCNC,CPTII,S$GLB, | Performed by: INTERNAL MEDICINE

## 2023-10-30 PROCEDURE — 99999 PR PBB SHADOW E&M-EST. PATIENT-LVL III: ICD-10-PCS | Mod: PBBFAC,HCNC,, | Performed by: INTERNAL MEDICINE

## 2023-10-30 PROCEDURE — 99215 PR OFFICE/OUTPT VISIT, EST, LEVL V, 40-54 MIN: ICD-10-PCS | Mod: HCNC,S$GLB,, | Performed by: INTERNAL MEDICINE

## 2023-10-30 PROCEDURE — 99215 OFFICE O/P EST HI 40 MIN: CPT | Mod: HCNC,S$GLB,, | Performed by: INTERNAL MEDICINE

## 2023-10-30 PROCEDURE — 1125F AMNT PAIN NOTED PAIN PRSNT: CPT | Mod: HCNC,CPTII,S$GLB, | Performed by: INTERNAL MEDICINE

## 2023-10-30 PROCEDURE — 3074F SYST BP LT 130 MM HG: CPT | Mod: HCNC,CPTII,S$GLB, | Performed by: INTERNAL MEDICINE

## 2023-10-30 PROCEDURE — 1159F PR MEDICATION LIST DOCUMENTED IN MEDICAL RECORD: ICD-10-PCS | Mod: HCNC,CPTII,S$GLB, | Performed by: INTERNAL MEDICINE

## 2023-10-30 NOTE — PROGRESS NOTES
"GENERAL GI PATIENT INTAKE:    COVID symptoms in the last 7 days (runny nose, sore throat, congestion, cough, fever): No  PCP: Sara Garay  If not PCP-  number given to establish [phone]: N/A    ALLERGIES REVIEWED:  Yes    CHIEF COMPLAINT:    Chief Complaint   Patient presents with    Abdominal Pain       VITAL SIGNS:  /80   Pulse 87   Ht 5' 4" (1.626 m)   Wt 45.6 kg (100 lb 8.5 oz)   LMP 08/29/1998   BMI 17.26 kg/m²      Change in medical, surgical, family or social history: No      REVIEWED MEDICATION LIST RECONCILED INCLUDING ABOVE MEDS:  Yes     "

## 2023-10-30 NOTE — Clinical Note
Padmini please schedule patient for lab work today 2nd floor CT enterography across the street at the imaging center To endoscopy schedulers today to schedule EGD colonoscopy Return to GI clinic 3 months for follow-up

## 2023-10-30 NOTE — Clinical Note
"Procedure: EGD/Colonoscopy  Diagnosis: GERD  Procedure Timin-6 weeks  *If within 4 weeks selected, please delmar as high priority*  *If greater than 12 weeks, please select "5-12 weeks" and delay sending until 3 months prior to requested date*  Provider: Myself  Location: No Preference  Additional Scheduling Information: No scheduling concerns  Prep Specifications:Standard prep  Is the patient taking a GLP-1 Agonist:no  Have you attached a patient to this message: yes "

## 2023-10-30 NOTE — PROGRESS NOTES
[facility] Consultation Note    Reason for Consult:  The primary encounter diagnosis was Serrated polyp of colon. Diagnoses of Left lower quadrant pain, Postprandial abdominal bloating, and Encounter for monitoring long-term proton pump inhibitor therapy were also pertinent to this visit.    PCP:   Sara Garay       Referring MD:  No referring provider defined for this encounter.    Initial History of Present Illness (HPI):  This is a 73 y.o. female here for evaluation of postprandial abdominal bloating no weight loss no fever no chill new diagnosed of LPR on a PPI no early satiety no blood in stool not constipated but feels like an incomplete evacuation history of sessile serrated colon polyp will set up patient for lab work CT scan EGD colonoscopy for further evaluation will recommend she follow-up with gyn for gyn exam since she still has a uterus and ovary.  She did try Levsin but no benefit with abdominal cramping she does not feel like she is constipated will try to expedite her workup.  No chest pain or shortness of breath no flushing wheezing no blood in her stool.    Abdominal pain - as above  Reflux -as above  Dysphagia - no   Bowel habits - normal  GI bleeding - none  NSAID usage - none    Interval HPI 10/30/2023:  The patient's last visit with me was on 11/13/2020.      ROS:  Constitutional: No fevers, chills, No weight loss  ENT:  As above heartburn no dysphagia no odynophagia no hoarseness  CV: No chest pain, no palpitation  Pulm: No cough, No shortness of breath, no wheezing  Ophtho: No vision changes  GI: see HPI  Derm: No rash, no itching  Heme: No lymphadenopathy, No easy bruising  MSK: No significant arthritis  : No dysuria, No hematuria  Endo: No hot or cold intolerance  Neuro: No syncope, No seizure, no strokes  Psych: No uncontrolled anxiety, No uncontrolled depression    Medical History:  has a past medical history of Arthritis, Multiple thyroid nodules,  "Osteoporosis, Reflux, and 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    Surgical History:  has a past surgical history that includes Colonoscopy (N/A, 9/14/2020).    Family History: family history includes Cancer in her father; Cataracts in her mother..     Social History:  reports that she has never smoked. She has never used smokeless tobacco. She reports that she does not drink alcohol and does not use drugs.    Review of patient's allergies indicates:   Allergen Reactions    Ciprofloxacin      palpitations    Flagyl [metronidazole hcl]      Cause urinary frequency       Medication List with Changes/Refills   Current Medications    CHLORHEXIDINE (PERIDEX) 0.12 % SOLUTION        ESOMEPRAZOLE (NEXIUM) 40 MG CAPSULE    Take 1 capsule (40 mg total) by mouth once daily.    HYOSCYAMINE (ANASPAZ,LEVSIN) 0.125 MG TAB    Take 1 tablet (125 mcg total) by mouth every 6 (six) hours as needed (abdominal pain).    MULTIVITAMIN (THERAGRAN) PER TABLET    Take 1 tablet by mouth Daily.   Discontinued Medications    DICYCLOMINE (BENTYL) 20 MG TABLET    Take 1 tablet (20 mg total) by mouth 3 (three) times daily as needed (abdominal pain).         Objective Findings:    Vital Signs:  /80   Pulse 87   Ht 5' 4" (1.626 m)   Wt 45.6 kg (100 lb 8.5 oz)   LMP 08/29/1998   BMI 17.26 kg/m²   Body mass index is 17.26 kg/m².    Physical Exam:  General Appearance: Well appearing in no acute distress  Eyes:    No scleral icterus  ENT:  No lesions or masses   Lungs: CTA bilaterally, no wheezes, no rhonchi, no rales  Heart:  S1, S2 normal, no murmurs heard  Abdomen:  Non distended, soft, no guarding, no rebound, left lower quadrant tenderness, no appreciated ascites, no bruits, no hepatosplenomegaly,  No CVA tenderness, no appreciated hernias, no Hassan sign no McBurney point tenderness  Musculoskeletal:  No major joint deformities  Skin: No petechiae or rash on exposed skin areas  Neurologic:  Alert and oriented x4  Psychiatric: "  Normal speech mentation and affect    Labs:  Lab Results   Component Value Date    WBC 3.79 (L) 03/07/2023    HGB 14.1 03/07/2023    HCT 46.5 03/07/2023     03/07/2023    CHOL 232 (H) 03/07/2023    TRIG 106 03/07/2023    HDL 95 (H) 03/07/2023    ALT 20 03/07/2023    AST 30 03/07/2023     03/07/2023    K 4.8 03/07/2023     03/07/2023    CREATININE 0.8 03/07/2023    BUN 12 03/07/2023    CO2 28 03/07/2023    TSH 2.283 03/07/2023    PSA <0.01 09/24/2014    INR 1.0 01/17/2004    HGBA1C 5.6 11/17/2015       Medical Decision Making:  Lab work reviewed prior colonoscopy reviewed Path reviewed  Prior CT scan images and report reviewed  Lab work talk given EGD colonoscopy talk given CT scan talk given follow-up with gyn talk given      Assessment:  1. Serrated polyp of colon    2. Left lower quadrant pain    3. Postprandial abdominal bloating    4. Encounter for monitoring long-term proton pump inhibitor therapy         Recommendations:  1. Lab work today  2. CT enterography  3. To endoscopy schedulers for EGD colonoscopy  4. To gyn for follow-up abdominal bloating lower abdominal discomfort for gyn exam  5. Return GI clinic 3 months for follow-up    Follow up in about 3 months (around 1/30/2024).      Order summary:  Orders Placed This Encounter    CT Enterography Abd_Pelvis With Contrast    Lipase    TISSUE TRANSGLUTAMINASE (TTG), IGA    IgA    CBC Auto Differential    Comprehensive Metabolic Panel    Vitamin D    Vitamin B12    TSH         Thank you so much for allowing me to participate in the care of Marshall Almanza    Herminio Franco MD    DISCLAIMER: This note was prepared with [employer] voice recognition transcription software. Garbled syntax, mangled or inadvertent pronouns, and other bizarre constructions may be attributed to that software system. While efforts were made to correct any mistakes made by this voice recognition program, some errors and/or omissions may remain in the note that were missed  when the note was originally created.

## 2023-10-31 ENCOUNTER — TELEPHONE (OUTPATIENT)
Dept: GASTROENTEROLOGY | Facility: CLINIC | Age: 73
End: 2023-10-31
Payer: MEDICARE

## 2023-10-31 ENCOUNTER — TELEPHONE (OUTPATIENT)
Dept: ENDOSCOPY | Facility: HOSPITAL | Age: 73
End: 2023-10-31
Payer: MEDICARE

## 2023-10-31 ENCOUNTER — LAB VISIT (OUTPATIENT)
Dept: LAB | Facility: HOSPITAL | Age: 73
End: 2023-10-31
Attending: INTERNAL MEDICINE
Payer: MEDICARE

## 2023-10-31 VITALS — HEIGHT: 64 IN | WEIGHT: 100 LBS | BODY MASS INDEX: 17.07 KG/M2

## 2023-10-31 DIAGNOSIS — K21.9 GASTROESOPHAGEAL REFLUX DISEASE, UNSPECIFIED WHETHER ESOPHAGITIS PRESENT: Primary | ICD-10-CM

## 2023-10-31 DIAGNOSIS — Z79.899 ENCOUNTER FOR MONITORING LONG-TERM PROTON PUMP INHIBITOR THERAPY: ICD-10-CM

## 2023-10-31 DIAGNOSIS — R14.0 POSTPRANDIAL ABDOMINAL BLOATING: ICD-10-CM

## 2023-10-31 DIAGNOSIS — K63.5 SERRATED POLYP OF COLON: ICD-10-CM

## 2023-10-31 DIAGNOSIS — Z12.11 SCREEN FOR COLON CANCER: Primary | ICD-10-CM

## 2023-10-31 DIAGNOSIS — Z12.11 SCREEN FOR COLON CANCER: ICD-10-CM

## 2023-10-31 DIAGNOSIS — E83.52 SERUM CALCIUM ELEVATED: Primary | ICD-10-CM

## 2023-10-31 DIAGNOSIS — R10.32 LEFT LOWER QUADRANT PAIN: ICD-10-CM

## 2023-10-31 DIAGNOSIS — Z51.81 ENCOUNTER FOR MONITORING LONG-TERM PROTON PUMP INHIBITOR THERAPY: ICD-10-CM

## 2023-10-31 LAB
25(OH)D3+25(OH)D2 SERPL-MCNC: 54 NG/ML (ref 30–96)
ALBUMIN SERPL BCP-MCNC: 4.2 G/DL (ref 3.5–5.2)
ALP SERPL-CCNC: 61 U/L (ref 55–135)
ALT SERPL W/O P-5'-P-CCNC: 21 U/L (ref 10–44)
ANION GAP SERPL CALC-SCNC: 14 MMOL/L (ref 8–16)
AST SERPL-CCNC: 31 U/L (ref 10–40)
BASOPHILS # BLD AUTO: 0.02 K/UL (ref 0–0.2)
BASOPHILS NFR BLD: 0.4 % (ref 0–1.9)
BILIRUB SERPL-MCNC: 0.8 MG/DL (ref 0.1–1)
BUN SERPL-MCNC: 13 MG/DL (ref 8–23)
CALCIUM SERPL-MCNC: 10.6 MG/DL (ref 8.7–10.5)
CHLORIDE SERPL-SCNC: 101 MMOL/L (ref 95–110)
CO2 SERPL-SCNC: 24 MMOL/L (ref 23–29)
CREAT SERPL-MCNC: 0.8 MG/DL (ref 0.5–1.4)
DIFFERENTIAL METHOD: NORMAL
EOSINOPHIL # BLD AUTO: 0 K/UL (ref 0–0.5)
EOSINOPHIL NFR BLD: 0 % (ref 0–8)
ERYTHROCYTE [DISTWIDTH] IN BLOOD BY AUTOMATED COUNT: 13.2 % (ref 11.5–14.5)
EST. GFR  (NO RACE VARIABLE): >60 ML/MIN/1.73 M^2
GLUCOSE SERPL-MCNC: 97 MG/DL (ref 70–110)
HCT VFR BLD AUTO: 43.7 % (ref 37–48.5)
HGB BLD-MCNC: 14 G/DL (ref 12–16)
IGA SERPL-MCNC: 144 MG/DL (ref 40–350)
IMM GRANULOCYTES # BLD AUTO: 0 K/UL (ref 0–0.04)
IMM GRANULOCYTES NFR BLD AUTO: 0 % (ref 0–0.5)
LIPASE SERPL-CCNC: 25 U/L (ref 4–60)
LYMPHOCYTES # BLD AUTO: 1.3 K/UL (ref 1–4.8)
LYMPHOCYTES NFR BLD: 30 % (ref 18–48)
MCH RBC QN AUTO: 30.2 PG (ref 27–31)
MCHC RBC AUTO-ENTMCNC: 32 G/DL (ref 32–36)
MCV RBC AUTO: 94 FL (ref 82–98)
MONOCYTES # BLD AUTO: 0.3 K/UL (ref 0.3–1)
MONOCYTES NFR BLD: 6.7 % (ref 4–15)
NEUTROPHILS # BLD AUTO: 2.8 K/UL (ref 1.8–7.7)
NEUTROPHILS NFR BLD: 62.9 % (ref 38–73)
NRBC BLD-RTO: 0 /100 WBC
PLATELET # BLD AUTO: 243 K/UL (ref 150–450)
PMV BLD AUTO: 9.9 FL (ref 9.2–12.9)
POTASSIUM SERPL-SCNC: 4.5 MMOL/L (ref 3.5–5.1)
PROT SERPL-MCNC: 7.2 G/DL (ref 6–8.4)
RBC # BLD AUTO: 4.63 M/UL (ref 4–5.4)
SODIUM SERPL-SCNC: 139 MMOL/L (ref 136–145)
TSH SERPL DL<=0.005 MIU/L-ACNC: 1.74 UIU/ML (ref 0.4–4)
VIT B12 SERPL-MCNC: 1087 PG/ML (ref 210–950)
WBC # BLD AUTO: 4.47 K/UL (ref 3.9–12.7)

## 2023-10-31 PROCEDURE — 85025 COMPLETE CBC W/AUTO DIFF WBC: CPT | Mod: HCNC | Performed by: INTERNAL MEDICINE

## 2023-10-31 PROCEDURE — 36415 COLL VENOUS BLD VENIPUNCTURE: CPT | Performed by: INTERNAL MEDICINE

## 2023-10-31 PROCEDURE — 80053 COMPREHEN METABOLIC PANEL: CPT | Mod: HCNC | Performed by: INTERNAL MEDICINE

## 2023-10-31 PROCEDURE — 82784 ASSAY IGA/IGD/IGG/IGM EACH: CPT | Mod: HCNC | Performed by: INTERNAL MEDICINE

## 2023-10-31 PROCEDURE — 82306 VITAMIN D 25 HYDROXY: CPT | Mod: HCNC | Performed by: INTERNAL MEDICINE

## 2023-10-31 PROCEDURE — 83690 ASSAY OF LIPASE: CPT | Mod: HCNC | Performed by: INTERNAL MEDICINE

## 2023-10-31 PROCEDURE — 82607 VITAMIN B-12: CPT | Mod: HCNC | Performed by: INTERNAL MEDICINE

## 2023-10-31 PROCEDURE — 84443 ASSAY THYROID STIM HORMONE: CPT | Mod: HCNC | Performed by: INTERNAL MEDICINE

## 2023-10-31 PROCEDURE — 86364 TISS TRNSGLTMNASE EA IG CLAS: CPT | Mod: HCNC | Performed by: INTERNAL MEDICINE

## 2023-10-31 NOTE — TELEPHONE ENCOUNTER
"----- Message from Shannon Lau sent at 10/31/2023  9:08 AM CDT -----    ----- Message -----  From: Herminio Franco MD  Sent: 10/30/2023   5:06 PM CDT  To: [facility] Endo Schedulers    Procedure: EGD/Colonoscopy    Diagnosis: GERD    Procedure Timin-6 weeks    #If within 4 weeks selected, please delmar as high priority#    #If greater than 12 weeks, please select "5-12 weeks" and delay sending until 3 months prior to requested date#    Provider: Myself    Location: No Preference    Additional Scheduling Information: No scheduling concerns    Prep Specifications:Standard prep    Is the patient taking a GLP-1 Agonist:no    Have you attached a patient to this message: yes     "

## 2023-10-31 NOTE — TELEPHONE ENCOUNTER
----- Message from Herminio Franco MD sent at 10/30/2023  5:08 PM CDT -----  Please refer to gyn for gyn exam referral placed

## 2023-10-31 NOTE — TELEPHONE ENCOUNTER
Spoke to Marshall to schedule procedure(s) Colonoscopy/EGD       Physician to perform procedure(s) Dr. MYKE Franco  Date of Procedure (s) 2/29/24  Arrival Time 9:30 AM  Time of Procedure(s) 10:30 AM   Location of Procedure(s) [place] 4th Floor  Type of Rx Prep sent to patient: PEG  Instructions provided to patient via Postal Mail    Patient was informed on the following information and verbalized understanding. Screening questionnaire reviewed with patient and complete. If procedure requires anesthesia, a responsible adult needs to be present to accompany the patient home, patient cannot drive after receiving anesthesia. Appointment details are tentative, especially check-in time. Patient will receive a prep-op call 4 days prior to confirm check-in time for procedure. If applicable the patient should contact their pharmacy to verify Rx for procedure prep is ready for pick-up. Patient was advised to call the scheduling department at [phone] if pharmacy states no Rx is available. Patient was advised to call the endoscopy scheduling department if any questions or concerns arise.       Endoscopy Scheduling Department

## 2023-10-31 NOTE — PROGRESS NOTES
Daniela please tell patient that her serum calcium is slightly elevated as well as her vitamin B12 this is most likely due to taking calcium supplements like Tums or a vitamin B12 supplement or multivitamin that has vitamin B12 in it.    Please let me know if she is not on vitamin B12 supplement or multivitamin with B12 in it.    Also recommend if she is taking calcium supplements that she ought a hold it for a week and repeat serum calcium and ionized calcium levels in 1 week orders have been placed.  Thank you

## 2023-10-31 NOTE — TELEPHONE ENCOUNTER
Patient scheduled to see Latanya Au NP in GYN at the [place] location on 12/22 for 7:30.   Confirmation mailed.   Padmini

## 2023-10-31 NOTE — TELEPHONE ENCOUNTER
Are you ready for your Colonoscopy?      __ If you take blood thinners, have you stopped taking them according to your doctor's instructions before your procedures?  __ Have you stopped eating solid foods and followed a clear liquid diet a full day before your procedure or followed the diet       guidelines indicated in your instructions? REMINDER: NO BROTH AFTER MIDNIGHT THE DAY BEFORE PROCEDURE.   __ Have you completed all your prep solution? PLEASE DO NOT FOLLOW the insert/or box instructions from pharmacy)  __ Have you taken your blood pressure, heart, seizure, or other essential medications the morning of your procedure?  __ Have you planned for a ride to and from procedure?  (Medical Transportation, Uber, Lyft, Taxi, etc. may ONLY be used if a responsible adult is present to accompany you home). The responsible adult CANNOT be the  of the service.       Questions or Concerns?  Please call us!  [phone] (M-F) [phone] (Nights and weekends)    Listed below are some helpful tips:    Please bring protective cases for eyewear and hearing aids-wear comfortable clothing/shoes.  Follow prep instructions closely so you don't have to do it twice.  Bowel prep is prescription used to clean out the colon before a colonoscopy. The prep increases movement of your colon by causing you to have diarrhea (loose stools). Cleaning out stool from the colon helps your doctor to see in your colon clearly during this procedure.   It is important to stay hydrated before, during and after bowel prep to prevent loss of fluid (dehydration). You can have water and your choice of clear liquids. Reminder: these liquids you will drink in addition to the bowel prep.     Preparing the mixture:    First, mix the prep with water.  Make the taste better by adding a sugar free drink mix (Crystal Light) can improve the taste of your prep.   Use a large bore (opening) straw. Place towards the back of mouth (throat) as  tolerated.  Prepare a prep mixture that is lightly chilled, but not ice-cold. Drinking a large amount of ice-cold liquid can make you feel very ill.  Avoid drinking any RED beverages or eating popsicles with this color for the 24 hours leading up to your procedure. This color can look like blood in the colon.    Consuming the prep:    Take your time. If you feel ill, take a 15-minute break from drinking the prep mixture  Combat hunger and dehydration with clear liquids. Options like JELL-O, or popsicles will help.  Settle in with good reading material. The goal is to clean out 6 feet of colon, so you can plan on spending a good deal of time in the bathroom. Have some good reading material on-hand or an iPad ready to keep yourself entertained!  Keep yourself comfortable. We recommend applying personal hygiene wipes, Tucks pads and a soothing ointment, like A&D ointment, Desitin, or Vaseline to your bottom before starting and as needed to protect your skin.         IMPORTANT INFORMATION TO KNOW BEFORE YOUR PROCEDURE    [facility] [place] 4th Floor    If your procedure requires the administration of anesthesia, it is necessary for a responsible adult to drive you home. (Medical Transportation, Uber, Lyft, Taxi, etc. may ONLY be used if a responsible adult is present to accompany you home.  The responsible adult CAN'T be the  of the service).      person must be available to return to pick you up within 30 minutes of being notified of discharge.     Due to the limited socially distant seating in our waiting room, please limit your guest (1) who accompany you for this procedure. If someone accompanies you for this procedure into the facility:    Consider having them proceed to an area that is socially distant other than the lobby until a member of the medical team contacts them to provide an update after the procedure.     Also, please consider being dropped off and picked up from the  facility.      Please bring a picture ID, insurance card, & copayment    Take Medications as directed below:      1) Stop taking   (prior to the procedure) on:      2) Stop taking   (prior to the procedure) on:     If you begin taking any blood thinning medications, please contact the  listed below as soon as possible.    If you are diabetic see the attached instruction sheet regarding your medication.     If you take HEART, BLOOD PRESSURE, SEIZURE, PAIN, LUNG (including inhalers/nebulizers), ANTI-REJECTION (transplant patients), or PSYCHIATRIC medications, please take at your regular times with a sip of water or as directed by the scheduling nurse.     Important contact information:    Endoscopy [phone] Hours of operation Monday-Friday 8:00-4:30pm.    Questions about insurance or financial obligations call [phone] or [phone].    If you have questions regarding the prep or need to reschedule, please call [phone]. After hours questions requiring immediate assistance, contact [facility] nurse line at [phone] or [phone].   NOTE:     On occasion, unforeseen circumstances may cause a delay in your procedure start time. We respect your time and appreciate your patience during these circumstances.      Comments:                Colonoscopy Procedure Prep Instructions    Date of procedure: 2/29/2Arrive at: 9:30am    Location of Department:   [facility] [address] [place] [address]  Take the [facility] Elevators to 4th Floor Endoscopy Lab    As soon as possible:   your prep from pharmacy and over the counter DULCOLAX LAXATIVE TABLETS            On the day before your procedure   What You CAN do:   You may have clear liquids ONLY-see below for list.     Liquids That Are OK to Drink:   Water  Sports drinks (Gatorade, Power-Aid)  Coffee or tea (no cream or nondairy creamer)  Clear juices without pulp (apple, white grape)  Gelatin  desserts (no fruit or toppings)  Clear soda (sprite, coke, ginger ale)  Chicken broth (until 12 midnight the night before procedure)    What You CANNOT do:   Do not EAT solid food, drink milk or anything   colored red.  Do not drink alcohol.  Do not take oral medications within 1 hour of starting   each dose of prep.  No gum chewing or candy morning of procedure                       Note:   (Please disregard the insert instructions from pharmacy).  PEG Bowel Prep is indicated for cleansing of the colon as a preparation for colonoscopy in adults.   Be sure to tell your doctor about all the medicines you take, including prescription and non-prescription medicines, vitamins, and herbal supplements. PEG Bowel Prep may affect how other medicines work.  Medication taken by mouth may not be absorbed properly when taken within 1 hour before the start of each dose of PEG Bowel Prep.    It is not uncommon to experience some abdominal cramping, nausea and/or vomiting when taking the prep. If you have nausea and/or vomiting while taking the prep, stop drinking for 20 to 30 minutes then continue.      How to take prep:    PEG Bowel Prep is a (2-day) prep.    One (1) bottle of prep are required for a complete preparation for colonoscopy. Dilute the solution concentrate as directed prior to use. You must drink water with each dose of prep, and additional water after each dose.    DOSE 1--Day Before Colonoscopy 2/28/24     Drink at least 6 to 8 glasses of clear liquids from time you wake up until you begin your prep and then continue until bedtime to avoid dehydration.     12:00 pm (NOON) Mix your entire container of prep with lukewarm water and refrigerate. Take four (4) Dulcolax (Bisacodyl) tablets with at least 8 ounces or more of clear liquids.       6:00 pm:    You must complete Steps 1 and 2 below before going to bed:    Step 1-Drink half the liquid in the container within one (1) hour.   Step 2-Refrigerate the remaining  half of the liquid for dose 2. See below when to begin this step.                       IMPORTANT: If you experience preparation-related symptoms (for example, nausea, bloating, or cramping), stop, or slow the rate of drinking the additional water until your symptoms decrease.    DOSE 2--Day of the Colonoscopy 2/29/24 at 2-3 AM.    For this dose, repeat Step 1 shown above using the remaining half of the liquid prep.   You may continue drinking water/clear liquids until   4 hours before your colonoscopy or as directed by the scheduling nurse  6:30 AM.    For more information about your procedure, please watch this informational video. It is important to watch this animated consent video prior to your arrival.   If you haven't watched the video prior to arriving, you are required to watch it during admission which can cause delays.     Options for viewing:  Using a keyboard:  press and hold the control tab (Ctrl) and left mouse click to follow link          Colonoscopy Instructional Video                                                        OR    Type link address into your web browser's address bar:  [email or web address]/[email or web address]    Using a mobile phone: tap on web address/link.     Comments:

## 2023-10-31 NOTE — TELEPHONE ENCOUNTER
----- Message from Herminio Franco MD sent at 10/31/2023  9:57 AM CDT -----  Yes correct.  We need to have a diagnosis first I told her. When is her CT scan? Can we get it this week if possible to help expedite her care.  ----- Message -----  From: Annabella Lutz MA  Sent: 10/31/2023   9:11 AM CDT  To: Herminio Franco MD    Spoke with patient.   She does not know how to use the [facility] portal.   I did ask her what the question was and she stated she was looking for something for pain.   I explained to her you do not prescribe pain medication and the point of ordering test is to see where her problem may be coming from and then if necessary you will make a recommendation.     Padmini  ----- Message -----  From: Herminio Franco MD  Sent: 10/31/2023   8:09 AM CDT  To: Annabella Lutz MA; Sara Garay MD    If she is on the Portal can you ask her to send me her questions so I can review and answer them for her in a timely manner.    Thanks    Herminio  ----- Message -----  From: Annabella Lutz MA  Sent: 10/31/2023   6:54 AM CDT  To: MD Aaliyah Melendez, when I was discharging her yesterday she stated she felt like she did not have enough time with you and has more questions.   She wanted to know if you could call her.   You were not in your office at this time.   Padmini  ----- Message -----  From: Herminio Franco MD  Sent: 10/30/2023   5:06 PM CDT  To: Salvador Washington please schedule patient for lab work today 2nd floor  CT enterography across the street at the imaging center  To endoscopy schedulers today to schedule EGD colonoscopy  Return to GI clinic 3 months for follow-up

## 2023-11-01 ENCOUNTER — TELEPHONE (OUTPATIENT)
Dept: ENDOSCOPY | Facility: HOSPITAL | Age: 73
End: 2023-11-01
Payer: MEDICARE

## 2023-11-02 ENCOUNTER — HOSPITAL ENCOUNTER (OUTPATIENT)
Dept: RADIOLOGY | Facility: HOSPITAL | Age: 73
Discharge: HOME | End: 2023-11-02
Attending: INTERNAL MEDICINE
Payer: MEDICARE

## 2023-11-02 DIAGNOSIS — Z51.81 ENCOUNTER FOR MONITORING LONG-TERM PROTON PUMP INHIBITOR THERAPY: ICD-10-CM

## 2023-11-02 DIAGNOSIS — R10.32 LEFT LOWER QUADRANT PAIN: ICD-10-CM

## 2023-11-02 DIAGNOSIS — K63.5 SERRATED POLYP OF COLON: ICD-10-CM

## 2023-11-02 DIAGNOSIS — R14.0 POSTPRANDIAL ABDOMINAL BLOATING: ICD-10-CM

## 2023-11-02 DIAGNOSIS — Z79.899 ENCOUNTER FOR MONITORING LONG-TERM PROTON PUMP INHIBITOR THERAPY: ICD-10-CM

## 2023-11-02 PROCEDURE — 74177 CT ABD & PELVIS W/CONTRAST: CPT | Mod: 26,HCNC,, | Performed by: RADIOLOGY

## 2023-11-02 PROCEDURE — 25500020 PHARM REV CODE 255: Mod: HCNC | Performed by: INTERNAL MEDICINE

## 2023-11-02 PROCEDURE — A9698 NON-RAD CONTRAST MATERIALNOC: HCPCS | Mod: HCNC | Performed by: INTERNAL MEDICINE

## 2023-11-02 PROCEDURE — 74177 CT ABD & PELVIS W/CONTRAST: CPT | Mod: TC,HCNC

## 2023-11-02 PROCEDURE — 74177 CT ENTEROGRAPHY ABD_PELVIS WITH CONTRAST: ICD-10-PCS | Mod: 26,HCNC,, | Performed by: RADIOLOGY

## 2023-11-02 RX ADMIN — BARIUM SULFATE 225 ML: 1 SUSPENSION ORAL at 08:11

## 2023-11-02 RX ADMIN — BARIUM SULFATE 450 ML: 1 SUSPENSION ORAL at 08:11

## 2023-11-02 RX ADMIN — IOHEXOL 100 ML: 350 INJECTION, SOLUTION INTRAVENOUS at 08:11

## 2023-11-02 NOTE — TELEPHONE ENCOUNTER
----- Message from Herminio Frnaco MD sent at 10/31/2023  6:17 PM CDT -----  Daniela please tell patient that her serum calcium is slightly elevated as well as her vitamin B12 this is most likely due to taking calcium supplements like Tums or a vitamin B12 supplement or multivitamin that has vitamin B12 in it.    Please let me know if she is not on vitamin B12 supplement or multivitamin with B12 in it.    Also recommend if she is taking calcium supplements that she ought a hold it for a week and repeat serum calcium and ionized calcium levels in 1 week orders have been placed.  Thank you

## 2023-11-03 ENCOUNTER — TELEPHONE (OUTPATIENT)
Dept: HEPATOLOGY | Facility: CLINIC | Age: 73
End: 2023-11-03
Payer: MEDICARE

## 2023-11-03 ENCOUNTER — TELEPHONE (OUTPATIENT)
Dept: ENDOSCOPY | Facility: HOSPITAL | Age: 73
End: 2023-11-03
Payer: MEDICARE

## 2023-11-03 DIAGNOSIS — K59.00 CONSTIPATION, ACUTE: Primary | ICD-10-CM

## 2023-11-03 DIAGNOSIS — K76.0 FATTY LIVER: Primary | ICD-10-CM

## 2023-11-03 LAB — TTG IGA SER-ACNC: 0.3 U/ML

## 2023-11-03 RX ORDER — POLYETHYLENE GLYCOL 3350 17 G/17G
17 POWDER, FOR SOLUTION ORAL DAILY
Qty: 510 G | Refills: 0 | Status: SHIPPED | OUTPATIENT
Start: 2023-11-03 | End: 2023-12-03

## 2023-11-03 NOTE — TELEPHONE ENCOUNTER
----- Message from Herminio Franco MD sent at 11/3/2023 11:59 AM CDT -----  Daniela- please schedule Ms. Almanza for liver ultrasound in 6 months (5/2024) for 6 months follow up of non-specific small liver hypo densities - orders placed.    Daniela - please refer Ms. Almanza to Hepatology for evaluation of fatty liver - referral placed.    Daniela - please Tell Ms. Almanza that her CT scan showed some non-specific finds that may or may not be of any clinica significance but for her to keep her GYN clinic apt for evaluation of her lower abdominal pain and Multiple subcentimeter calcifications within the uterine fundus likely represent calcified fibroids.    Please tell her I will message endoscopy  to see if just the EGD part of her already schedule EGD and colonoscopy can be expedited for evaluation of CT scan reading of a non-specific finding of   Small bowel is normal caliber without obstruction or inflammation.  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        1. No acute abdominopelvic abnormality.  The appendix is not definitely visualized.  2. Narrowing of the aorta mesenteric angle without evidence of bowel obstruction.  3. Hepatic steatosis.  4. Additional findings as detailed in the body of the report.     Electronically signed by resident: Mane Campo  Date:                                            11/03/2023  Time:                                           08:44     Electronically signed by: Sigifredo Pierson MD  Date:                                            11/03/2023

## 2023-11-03 NOTE — TELEPHONE ENCOUNTER
Patient called to schedule a hepatology consult from referral.  Scheduled to the next available appt 1/22/2024 with Ms. Jacobsen. Patient confirmed and agreed with the schedule.  Reminder letter mailed.

## 2023-11-03 NOTE — TELEPHONE ENCOUNTER
I spoke with patient and informed her of the results  She says that she has abd pain and bloating after eating  She is having rectal pressure and feels like she has something stuck in her anus. She says her BM is not complete    She wants to know if there is something she can take for it.      Also, should she stop Levsin. She says it does not help.

## 2023-11-03 NOTE — PROGRESS NOTES
Daniela- please schedule Ms. Almanza for liver ultrasound in 6 months (5/2024) for 6 months follow up of non-specific small liver hypo densities - orders placed.    Daniela - please refer Ms. Almanza to Hepatology for evaluation of fatty liver - referral placed.    Daniela - please Tell Ms. Almanza that her CT scan showed some non-specific finds that may or may not be of any clinica significance but for her to keep her GYN clinic apt for evaluation of her lower abdominal pain and Multiple subcentimeter calcifications within the uterine fundus likely represent calcified fibroids.    Please tell her I will message endoscopy  to see if just the EGD part of her already schedule EGD and colonoscopy can be expedited for evaluation of CT scan reading of a non-specific finding of   Small bowel is normal caliber without obstruction or inflammation.  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       1. No acute abdominopelvic abnormality.  The appendix is not definitely visualized.  2. Narrowing of the aorta mesenteric angle without evidence of bowel obstruction.  3. Hepatic steatosis.  4. Additional findings as detailed in the body of the report.    Electronically signed by resident: Mane Campo  Date:                                            11/03/2023  Time:                                           08:44    Electronically signed by: Sigifredo Pierson MD  Date:                                            11/03/2023

## 2023-11-07 ENCOUNTER — CLINICAL SUPPORT (OUTPATIENT)
Dept: REHABILITATION | Facility: HOSPITAL | Age: 73
End: 2023-11-07
Payer: MEDICARE

## 2023-11-07 DIAGNOSIS — R29.3 POOR POSTURE: Primary | ICD-10-CM

## 2023-11-07 DIAGNOSIS — R29.898 DECREASED RANGE OF MOTION OF NECK: ICD-10-CM

## 2023-11-07 PROCEDURE — 97110 THERAPEUTIC EXERCISES: CPT

## 2023-11-07 NOTE — PROGRESS NOTES
[facility]   Physical Therapy Treatment Note / Discharge Summary     Name: Marshall Almanza  Clinic Number: [id number]    Therapy Diagnosis:   Encounter Diagnoses   Name Primary?    Poor posture Yes    Decreased range of motion of neck        Physician: Sara Garay MD    Visit Date: 11/7/2023    Physician Orders: PT Eval and Treat   Medical Diagnosis from Referral:   M54.2 (ICD-10-CM) - Neck pain   M25.519 (ICD-10-CM) - Shoulder pain, unspecified chronicity, unspecified laterality   Evaluation Date: 9/11/2023  Authorization Period Expiration: 8/1/2024  Plan of Care Expiration: 11/11/2023  Progress Note Due: 11/11/2023    Visit # / Visits authorized: 1/1; 6/40   FOTO: 1/ 3     Precautions: Standard, Osteoporosis        PTA Visit #: 1/5     Time In: 7:45 AM   Time Out: 8:30 AM   Total Billable Time: 45 minutes    Subjective     Pt denies pain at start of session, feels that she can continue her program at home.     She was compliant with home exercise program.  Response to previous treatment: initial eval   Functional change: none      Pain: 0/10  Location: bilateral shoulders and neck     Objective      11/7/2023:  Cervical Range of Motion: (Active Range of Motion)    % Limitation Goals   Flexion WNL Full and Pain free   Extension WNL  Full and Pain free   Right Rotation 58 deg 70 deg and Pain free   Left Rotation 64 deg 70 deg and Pain free   Right Side Bending 34 deg Full and Pain free   Left Side Bending 36 deg  Full and Pain free     Bilateral shoulder 5/5 t/o    FOTO: 23% limited    Treatment     Marshall received the treatments listed below:      therapeutic exercises to develop strength, ROM, and flexibility for 45 minutes including:   - reassessment  UBE 2'/2'  bilateral Upper Trap stretching 3 x 30 second hold each   bilateral Levator Scap stretching 3 x 30 second hold each   No Moneys red thera band 20x  Sidelying open books 10x bilaterally  Seated thoracic extension with foam roll  behind back 10x  Red thera band mid trap at wall 2x10  Wall slide with pillowcase for external rotation 20x  red thera band rows 2 x 10   red thera band shoulder extension 2 x 10     NT: (poor performance despite cueing)  seated chin tucks 3 x 10   towel snag with cervical rotation x 10 each side          Patient Education and Home Exercises       Education provided:   - Anatomy and diagnosis  - PT plan of care  - HEP     Written Home Exercises Provided: yes. Exercises were reviewed and Marshall was able to demonstrate them prior to the end of the session.  Marshall demonstrated good  understanding of the education provided. See EMR under Patient Instructions for exercises provided during therapy sessions.    Assessment   Patient reassessed today and has full functional cervical and shoulder range of motion without pain. She demonstrates good understanding of home exercise program and is in agreement with plan to discharge physical therapy at this time.      Marshall Is progressing well towards her goals.   Pt prognosis is Good.       Pt's spiritual, cultural and educational needs considered and pt agreeable to plan of care and goals.     Anticipated barriers to physical therapy:  none      Goals:   SHORT TERM GOALS:  4 weeks 9/11/23   Patient will be independent with HEP in order to assist therapy in return to maximal function. MET    Pain rating at Worst: 3/10 or less for improved tolerance to sleeping.  MET    Patient will demonstrate cervical rotation AROM at least 55 deg bilaterally for improved tolerance to turning her head while driving.  MET       LONG TERM GOALS: 8 weeks 9/11/23   Patient will demonstrate cervical rotation AROM 70 deg bilaterally to turn her head while driving with no difficulty.  NOT MEt    Patient will demonstrate B shoulder strength 5/5 for improved stability with overhead reaching.  MET    Pain Rating at Worst: 1/10 to improve overall Quality of Life.   MET   Patient will meet predicted  functional outcome (FOTO) score: 75% functional ability or greater to increase perceived functional ability.  MET   Patient will have met personal goal of: sleeping without disturbance due to neck symptoms. MET     Plan     Discharge physical therapy.    Marta Telles, PT

## 2023-11-10 ENCOUNTER — LAB VISIT (OUTPATIENT)
Dept: LAB | Facility: HOSPITAL | Age: 73
End: 2023-11-10
Attending: INTERNAL MEDICINE
Payer: MEDICARE

## 2023-11-10 DIAGNOSIS — E83.52 SERUM CALCIUM ELEVATED: ICD-10-CM

## 2023-11-10 LAB
CA-I BLDV-SCNC: 1.22 MMOL/L (ref 1.06–1.42)
CALCIUM SERPL-MCNC: 9.8 MG/DL (ref 8.7–10.5)

## 2023-11-10 PROCEDURE — 82310 ASSAY OF CALCIUM: CPT | Mod: HCNC | Performed by: INTERNAL MEDICINE

## 2023-11-10 PROCEDURE — 36415 COLL VENOUS BLD VENIPUNCTURE: CPT | Performed by: INTERNAL MEDICINE

## 2023-11-10 PROCEDURE — 82330 ASSAY OF CALCIUM: CPT | Mod: HCNC | Performed by: INTERNAL MEDICINE

## 2023-11-15 ENCOUNTER — OFFICE VISIT (OUTPATIENT)
Dept: PRIMARY CARE CLINIC | Facility: CLINIC | Age: 73
End: 2023-11-15
Payer: MEDICARE

## 2023-11-15 VITALS
BODY MASS INDEX: 17.16 KG/M2 | WEIGHT: 100.5 LBS | SYSTOLIC BLOOD PRESSURE: 101 MMHG | OXYGEN SATURATION: 94 % | DIASTOLIC BLOOD PRESSURE: 82 MMHG | HEIGHT: 64 IN | HEART RATE: 89 BPM

## 2023-11-15 DIAGNOSIS — R10.30 LOWER ABDOMINAL PAIN: Primary | ICD-10-CM

## 2023-11-15 PROCEDURE — 99213 OFFICE O/P EST LOW 20 MIN: CPT | Mod: HCNC,S$GLB,, | Performed by: STUDENT IN AN ORGANIZED HEALTH CARE EDUCATION/TRAINING PROGRAM

## 2023-11-15 PROCEDURE — 3074F PR MOST RECENT SYSTOLIC BLOOD PRESSURE < 130 MM HG: ICD-10-PCS | Mod: HCNC,CPTII,S$GLB, | Performed by: STUDENT IN AN ORGANIZED HEALTH CARE EDUCATION/TRAINING PROGRAM

## 2023-11-15 PROCEDURE — 3079F PR MOST RECENT DIASTOLIC BLOOD PRESSURE 80-89 MM HG: ICD-10-PCS | Mod: HCNC,CPTII,S$GLB, | Performed by: STUDENT IN AN ORGANIZED HEALTH CARE EDUCATION/TRAINING PROGRAM

## 2023-11-15 PROCEDURE — 1159F PR MEDICATION LIST DOCUMENTED IN MEDICAL RECORD: ICD-10-PCS | Mod: HCNC,CPTII,S$GLB, | Performed by: STUDENT IN AN ORGANIZED HEALTH CARE EDUCATION/TRAINING PROGRAM

## 2023-11-15 PROCEDURE — 1160F RVW MEDS BY RX/DR IN RCRD: CPT | Mod: HCNC,CPTII,S$GLB, | Performed by: STUDENT IN AN ORGANIZED HEALTH CARE EDUCATION/TRAINING PROGRAM

## 2023-11-15 PROCEDURE — 3079F DIAST BP 80-89 MM HG: CPT | Mod: HCNC,CPTII,S$GLB, | Performed by: STUDENT IN AN ORGANIZED HEALTH CARE EDUCATION/TRAINING PROGRAM

## 2023-11-15 PROCEDURE — 1125F AMNT PAIN NOTED PAIN PRSNT: CPT | Mod: HCNC,CPTII,S$GLB, | Performed by: STUDENT IN AN ORGANIZED HEALTH CARE EDUCATION/TRAINING PROGRAM

## 2023-11-15 PROCEDURE — 3074F SYST BP LT 130 MM HG: CPT | Mod: HCNC,CPTII,S$GLB, | Performed by: STUDENT IN AN ORGANIZED HEALTH CARE EDUCATION/TRAINING PROGRAM

## 2023-11-15 PROCEDURE — 1159F MED LIST DOCD IN RCRD: CPT | Mod: HCNC,CPTII,S$GLB, | Performed by: STUDENT IN AN ORGANIZED HEALTH CARE EDUCATION/TRAINING PROGRAM

## 2023-11-15 PROCEDURE — 99213 PR OFFICE/OUTPT VISIT, EST, LEVL III, 20-29 MIN: ICD-10-PCS | Mod: HCNC,S$GLB,, | Performed by: STUDENT IN AN ORGANIZED HEALTH CARE EDUCATION/TRAINING PROGRAM

## 2023-11-15 PROCEDURE — 1160F PR REVIEW ALL MEDS BY PRESCRIBER/CLIN PHARMACIST DOCUMENTED: ICD-10-PCS | Mod: HCNC,CPTII,S$GLB, | Performed by: STUDENT IN AN ORGANIZED HEALTH CARE EDUCATION/TRAINING PROGRAM

## 2023-11-15 PROCEDURE — 99999 PR PBB SHADOW E&M-EST. PATIENT-LVL III: ICD-10-PCS | Mod: PBBFAC,HCNC,, | Performed by: STUDENT IN AN ORGANIZED HEALTH CARE EDUCATION/TRAINING PROGRAM

## 2023-11-15 PROCEDURE — 99999 PR PBB SHADOW E&M-EST. PATIENT-LVL III: CPT | Mod: PBBFAC,HCNC,, | Performed by: STUDENT IN AN ORGANIZED HEALTH CARE EDUCATION/TRAINING PROGRAM

## 2023-11-15 PROCEDURE — 1125F PR PAIN SEVERITY QUANTIFIED, PAIN PRESENT: ICD-10-PCS | Mod: HCNC,CPTII,S$GLB, | Performed by: STUDENT IN AN ORGANIZED HEALTH CARE EDUCATION/TRAINING PROGRAM

## 2023-11-15 RX ORDER — DICYCLOMINE HYDROCHLORIDE 20 MG/1
TABLET ORAL
COMMUNITY
Start: 2023-11-03 | End: 2024-01-29

## 2023-11-15 NOTE — PROGRESS NOTES
Office visit  Patient: Marshall Almanza   11/15/2023     Assessment:     1. Lower abdominal pain      Plan:       1. Lower abdominal pain       -this sounds similar to previous episodes       -advised patient to continue Bentyl, and consider taking it before having a bowel movement        -also recommended that patient try MiraLax for sensation of tenesmus       -abdominal CT reassuring, and patient is scheduled for colonoscopy in February       -discussed symptoms that would prompt evaluation in the emergency department       -continue following with GI, and keep hepatology appointment    Follow-up in 1 year for annual physical or sooner if needed.      CHIEF COMPLAINT: abdominal pain    HPI: Marshall Almanza is a 73 y.o. female who presents for abdominal pain. She reports for the past few days, she has pain after the bowel movement. She reports that it feels crampy and bloating. She reports it's in the middle of her stomach. She states that the pain 7-8 out of 10. She reports sometimes the pain lasts for a half a day, but on good days she only feels uncomfortable. She reports that Bentyl seems to help. For the past 5-6 months, she also feels that she does not completely empty her bowels after she has a bowel movement.  She was given a prescription for constipation, but she didn't try it because she states she is not constipation. She reports a healthy diet - lots of vegetables, fruits, she avoids red meat, soft drinks, fast food, dessert.  She reports regular BM's in the morning. However, sometimes she has two movements a day. These are well-formed. She reports tenesmus.    Review of Systems   Constitutional:  Negative for chills, fever and weight loss.   Respiratory:  Negative for cough and shortness of breath.    Gastrointestinal:  Positive for abdominal pain. Negative for constipation, diarrhea, nausea and vomiting.   Musculoskeletal:  Positive for neck pain.   Skin:  Negative for rash.         Current Outpatient  "Medications   Medication Instructions    chlorhexidine (PERIDEX) 0.12 % solution No dose, route, or frequency recorded.    dicyclomine (BENTYL) 20 mg tablet TAKE 1 TABLET BY MOUTH 3 TIMES A DAY AS NEEDED FOR ABDOMINAL PAIN    esomeprazole (NEXIUM) 40 mg, Oral, Daily    hyoscyamine (ANASPAZ,LEVSIN) 125 mcg, Oral, Every 6 hours PRN    multivitamin (THERAGRAN) per tablet 1 tablet, Daily    polyethylene glycol (GLYCOLAX) 17 g, Oral, Daily, 17 grams in 12 ounces of water once daily as needed for constipation.       Lab Results   Component Value Date    HGBA1C 5.6 11/17/2015    HGBA1C 5.7 07/12/2013    HGBA1C 5.7 07/16/2012     No results found for: "MICALBCREAT"  Lab Results   Component Value Date    LDLCALC 115.8 03/07/2023    LDLCALC 110.8 03/30/2022    CHOL 232 (H) 03/07/2023    HDL 95 (H) 03/07/2023    TRIG 106 03/07/2023       Lab Results   Component Value Date     10/31/2023    K 4.5 10/31/2023     10/31/2023    CO2 24 10/31/2023    GLU 97 10/31/2023    BUN 13 10/31/2023    CREATININE 0.8 10/31/2023    CALCIUM 9.8 11/10/2023    PROT 7.2 10/31/2023    ALBUMIN 4.2 10/31/2023    BILITOT 0.8 10/31/2023    ALKPHOS 61 10/31/2023    AST 31 10/31/2023    ALT 21 10/31/2023    ANIONGAP 14 10/31/2023    ESTGFRAFRICA >60.0 10/15/2021    EGFRNONAA >60.0 10/15/2021    WBC 4.47 10/31/2023    HGB 14.0 10/31/2023    HGB 14.1 03/07/2023    HCT 43.7 10/31/2023    MCV 94 10/31/2023     10/31/2023    TSH 1.736 10/31/2023    PSA <0.01 09/24/2014    HEPCAB Negative 11/18/2020       Lab Results   Component Value Date    WQJQHNJH62QR 54 10/31/2023    RYRFYFFM80FX 58 03/07/2023    AXEJYNYN79 1087 (H) 10/31/2023    FERRITIN 188 06/16/2016    IRON 103 06/16/2016    TRANSFERRIN 244 06/16/2016    TIBC 361 06/16/2016    FESATURATED 29 06/16/2016         Past Medical History:   Diagnosis Date    Arthritis     Multiple thyroid nodules     Osteoporosis     Reflux     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        COVID test at [facility] on 9/11-GT     Vitals:    11/15/23 1304   BP: 101/82   Pulse: 89   SpO2: (!) 94%   Weight: 45.6 kg (100 lb 8.5 oz)   Height: 5' 4" (1.626 m)   PainSc:   7     Objective:   Physical Exam  Vitals reviewed.   Constitutional:       General: She is not in acute distress.     Appearance: She is well-developed.   HENT:      Head: Normocephalic and atraumatic.      Mouth/Throat:      Mouth: Mucous membranes are moist.   Eyes:      General: No scleral icterus.     Conjunctiva/sclera: Conjunctivae normal.   Neck:      Thyroid: No thyromegaly.   Cardiovascular:      Rate and Rhythm: Normal rate and regular rhythm.      Heart sounds: Normal heart sounds. No murmur heard.     No friction rub. No gallop.   Pulmonary:      Effort: Pulmonary effort is normal. No respiratory distress.      Breath sounds: Normal breath sounds. No wheezing, rhonchi or rales.   Abdominal:      General: There is no distension.      Palpations: Abdomen is soft. There is no mass.      Tenderness: There is no abdominal tenderness. There is no guarding.      Comments: Hyperactive bowel sounds   Musculoskeletal:      Right lower leg: No edema.      Left lower leg: No edema.   Lymphadenopathy:      Cervical: No cervical adenopathy.   Skin:     General: Skin is warm and dry.   Neurological:      General: No focal deficit present.      Mental Status: She is alert and oriented to person, place, and time.   Psychiatric:         Mood and Affect: Mood normal.         Behavior: Behavior normal.           Kasia Garcia MD  Internal Medicine and " Pediatrics

## 2023-12-22 ENCOUNTER — OFFICE VISIT (OUTPATIENT)
Dept: OBSTETRICS AND GYNECOLOGY | Facility: CLINIC | Age: 73
End: 2023-12-22
Payer: MEDICARE

## 2023-12-22 VITALS — SYSTOLIC BLOOD PRESSURE: 118 MMHG | WEIGHT: 97.88 LBS | BODY MASS INDEX: 16.8 KG/M2 | DIASTOLIC BLOOD PRESSURE: 64 MMHG

## 2023-12-22 DIAGNOSIS — R14.0 BLOATING: ICD-10-CM

## 2023-12-22 DIAGNOSIS — R10.32 LEFT LOWER QUADRANT PAIN: ICD-10-CM

## 2023-12-22 DIAGNOSIS — R10.30 ABDOMINAL PAIN, LOWER: Primary | ICD-10-CM

## 2023-12-22 DIAGNOSIS — R14.0 POSTPRANDIAL ABDOMINAL BLOATING: ICD-10-CM

## 2023-12-22 PROCEDURE — 99999 PR PBB SHADOW E&M-EST. PATIENT-LVL III: CPT | Mod: PBBFAC,,, | Performed by: NURSE PRACTITIONER

## 2023-12-22 PROCEDURE — 3288F FALL RISK ASSESSMENT DOCD: CPT | Mod: CPTII,S$GLB,, | Performed by: NURSE PRACTITIONER

## 2023-12-22 PROCEDURE — 1125F AMNT PAIN NOTED PAIN PRSNT: CPT | Mod: CPTII,S$GLB,, | Performed by: NURSE PRACTITIONER

## 2023-12-22 PROCEDURE — 1101F PT FALLS ASSESS-DOCD LE1/YR: CPT | Mod: CPTII,S$GLB,, | Performed by: NURSE PRACTITIONER

## 2023-12-22 PROCEDURE — 99203 OFFICE O/P NEW LOW 30 MIN: CPT | Mod: S$GLB,,, | Performed by: NURSE PRACTITIONER

## 2023-12-22 PROCEDURE — 3078F DIAST BP <80 MM HG: CPT | Mod: CPTII,S$GLB,, | Performed by: NURSE PRACTITIONER

## 2023-12-22 PROCEDURE — 3074F SYST BP LT 130 MM HG: CPT | Mod: CPTII,S$GLB,, | Performed by: NURSE PRACTITIONER

## 2024-01-11 ENCOUNTER — OFFICE VISIT (OUTPATIENT)
Dept: PRIMARY CARE CLINIC | Facility: CLINIC | Age: 74
End: 2024-01-11
Payer: MEDICARE

## 2024-01-11 VITALS
DIASTOLIC BLOOD PRESSURE: 66 MMHG | BODY MASS INDEX: 17.05 KG/M2 | HEIGHT: 64 IN | WEIGHT: 99.88 LBS | OXYGEN SATURATION: 97 % | HEART RATE: 85 BPM | RESPIRATION RATE: 20 BRPM | SYSTOLIC BLOOD PRESSURE: 116 MMHG

## 2024-01-11 DIAGNOSIS — E46 PROTEIN-CALORIE MALNUTRITION, UNSPECIFIED SEVERITY: ICD-10-CM

## 2024-01-11 DIAGNOSIS — I70.0 AORTIC ATHEROSCLEROSIS: ICD-10-CM

## 2024-01-11 DIAGNOSIS — K62.89 RECTAL PAIN: ICD-10-CM

## 2024-01-11 DIAGNOSIS — K64.9 HEMORRHOIDS, UNSPECIFIED HEMORRHOID TYPE: ICD-10-CM

## 2024-01-11 DIAGNOSIS — R10.30 LOWER ABDOMINAL PAIN: Primary | ICD-10-CM

## 2024-01-11 PROCEDURE — 99999 PR PBB SHADOW E&M-EST. PATIENT-LVL IV: CPT | Mod: PBBFAC,HCNC,, | Performed by: STUDENT IN AN ORGANIZED HEALTH CARE EDUCATION/TRAINING PROGRAM

## 2024-01-11 PROCEDURE — 1101F PT FALLS ASSESS-DOCD LE1/YR: CPT | Mod: HCNC,CPTII,S$GLB, | Performed by: STUDENT IN AN ORGANIZED HEALTH CARE EDUCATION/TRAINING PROGRAM

## 2024-01-11 PROCEDURE — 1159F MED LIST DOCD IN RCRD: CPT | Mod: HCNC,CPTII,S$GLB, | Performed by: STUDENT IN AN ORGANIZED HEALTH CARE EDUCATION/TRAINING PROGRAM

## 2024-01-11 PROCEDURE — 3074F SYST BP LT 130 MM HG: CPT | Mod: HCNC,CPTII,S$GLB, | Performed by: STUDENT IN AN ORGANIZED HEALTH CARE EDUCATION/TRAINING PROGRAM

## 2024-01-11 PROCEDURE — 99213 OFFICE O/P EST LOW 20 MIN: CPT | Mod: HCNC,S$GLB,, | Performed by: STUDENT IN AN ORGANIZED HEALTH CARE EDUCATION/TRAINING PROGRAM

## 2024-01-11 PROCEDURE — 1125F AMNT PAIN NOTED PAIN PRSNT: CPT | Mod: HCNC,CPTII,S$GLB, | Performed by: STUDENT IN AN ORGANIZED HEALTH CARE EDUCATION/TRAINING PROGRAM

## 2024-01-11 PROCEDURE — 1160F RVW MEDS BY RX/DR IN RCRD: CPT | Mod: HCNC,CPTII,S$GLB, | Performed by: STUDENT IN AN ORGANIZED HEALTH CARE EDUCATION/TRAINING PROGRAM

## 2024-01-11 PROCEDURE — 3078F DIAST BP <80 MM HG: CPT | Mod: HCNC,CPTII,S$GLB, | Performed by: STUDENT IN AN ORGANIZED HEALTH CARE EDUCATION/TRAINING PROGRAM

## 2024-01-11 PROCEDURE — 3288F FALL RISK ASSESSMENT DOCD: CPT | Mod: HCNC,CPTII,S$GLB, | Performed by: STUDENT IN AN ORGANIZED HEALTH CARE EDUCATION/TRAINING PROGRAM

## 2024-01-11 NOTE — PROGRESS NOTES
Office visit  Patient: Marshall Almanza   1/11/2024     Assessment:     1. Lower abdominal pain    2. Rectal pain    3. Aortic atherosclerosis    4. Protein-calorie malnutrition, unspecified severity    5. Hemorrhoids, unspecified hemorrhoid type      Plan:            1. Lower abdominal pain       -could be due to cramping from defecating       -patient to undergo pelvic ultrasound tomorrow       -follow up with GI    2. Rectal pain       -advised to continue using Preparation H       -referral given to colorectal surgery, as this may be caused by hemorrhoids    3. Aortic atherosclerosis       -stable    4. Protein-calorie malnutrition, unspecified severity      -stable    5. Hemorrhoids, unspecified hemorrhoid type  -     Ambulatory referral/consult to Colorectal Surgery; Future; Expected date: 01/18/2024        CHIEF COMPLAINT: abdominal pain and burning after bowel movement    HPI: Marshall Almanza is a 73 y.o. female who presents for abdominal pain. She reports that she feels abdominal cramping after a bowel movement. This  occurs in her lower abdomen.  She reports yesterday it was particularly bad, and the pain was about 7-8. She states she always feels bloating after eating. She tried Miralax, but it hasn't helped. She states her BM's are well-formed.  Sometimes she strains slightly.  She has a BM at least once, sometimes twice a day.  She also reports burning in her anus after a bowel movement.  She also feels tenesmus.  She reports that these symptoms don't happen every single time she has a BM.  She does not eat spicy foods.  She reports eating the same thing every day.  She states that she has hemorrhoids. She has used Preparation H, which helps for a little bit but the pain comes back.        Current Outpatient Medications   Medication Instructions    chlorhexidine (PERIDEX) 0.12 % solution No dose, route, or frequency recorded.    dicyclomine (BENTYL) 20 mg tablet TAKE 1 TABLET BY MOUTH 3 TIMES A DAY AS NEEDED  "FOR ABDOMINAL PAIN    esomeprazole (NEXIUM) 40 mg, Oral, Daily    hyoscyamine (ANASPAZ,LEVSIN) 125 mcg, Oral, Every 6 hours PRN    multivitamin (THERAGRAN) per tablet 1 tablet, Daily       Lab Results   Component Value Date    HGBA1C 5.6 11/17/2015    HGBA1C 5.7 07/12/2013    HGBA1C 5.7 07/16/2012     No results found for: "MICALBCREAT"  Lab Results   Component Value Date    LDLCALC 115.8 03/07/2023    LDLCALC 110.8 03/30/2022    CHOL 232 (H) 03/07/2023    HDL 95 (H) 03/07/2023    TRIG 106 03/07/2023       Lab Results   Component Value Date     10/31/2023    K 4.5 10/31/2023     10/31/2023    CO2 24 10/31/2023    GLU 97 10/31/2023    BUN 13 10/31/2023    CREATININE 0.8 10/31/2023    CALCIUM 9.8 11/10/2023    PROT 7.2 10/31/2023    ALBUMIN 4.2 10/31/2023    BILITOT 0.8 10/31/2023    ALKPHOS 61 10/31/2023    AST 31 10/31/2023    ALT 21 10/31/2023    ANIONGAP 14 10/31/2023    ESTGFRAFRICA >60.0 10/15/2021    EGFRNONAA >60.0 10/15/2021    WBC 4.47 10/31/2023    HGB 14.0 10/31/2023    HGB 14.1 03/07/2023    HCT 43.7 10/31/2023    MCV 94 10/31/2023     10/31/2023    TSH 1.736 10/31/2023    PSA <0.01 09/24/2014    HEPCAB Negative 11/18/2020       Lab Results   Component Value Date    DVVYOZCZ12ZN 54 10/31/2023    GPNEXQUP69WL 58 03/07/2023    FTDVVZLW51 1087 (H) 10/31/2023    FERRITIN 188 06/16/2016    IRON 103 06/16/2016    TRANSFERRIN 244 06/16/2016    TIBC 361 06/16/2016    FESATURATED 29 06/16/2016         Past Medical History:   Diagnosis Date    Arthritis     Multiple thyroid nodules     Osteoporosis     Reflux     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        COVID test at [facility] on 9/11-GT     Vitals:    01/11/24 0943   BP: 116/66   Pulse: 85   Resp: 20   SpO2: 97%   Weight: 45.3 kg (99 lb 13.9 oz)   Height: 5' 4" (1.626 m)   PainSc:   8   PainLoc: Abdomen     Objective:   Physical Exam  Vitals reviewed.   Constitutional:       General: She is not in acute distress.     Appearance: She is well-developed.   HENT:      Head: Normocephalic and atraumatic.      Right Ear: External ear normal.      Left Ear: External ear normal.      Nose: Nose normal. No rhinorrhea.      Mouth/Throat:      Mouth: Mucous membranes are moist.   Eyes:      General: No scleral icterus.        Right eye: No discharge.         Left eye: No discharge.      Conjunctiva/sclera: Conjunctivae normal.   Neck:      Thyroid: No thyromegaly.   Pulmonary:      Effort: Pulmonary effort is normal. No respiratory distress.   Abdominal:      General: Bowel sounds are normal. There is no distension.      Palpations: Abdomen is soft. There is no mass.      Tenderness: There is no abdominal tenderness. There is no guarding or rebound.   Musculoskeletal:         General: Normal range of motion.      Right lower leg: No edema.      Left lower leg: No edema.   Skin:     General: Skin is warm and dry.   Neurological:      Mental Status: She is alert and oriented to person, place, and time.   Psychiatric:         Mood and Affect: Mood normal.         Behavior: Behavior normal.             Kasia Garcia MD  Internal Medicine and Pediatrics                            " Consent 2/Introductory Paragraph: Mohs surgery was explained to the patient and consent was obtained. The risks, benefits and alternatives to therapy were discussed in detail. Specifically, the risks of infection, scarring, bleeding, prolonged wound healing, incomplete removal, allergy to anesthesia, nerve injury and recurrence were addressed. Prior to the procedure, the treatment site was clearly identified and confirmed by the patient. All components of Universal Protocol/PAUSE Rule completed.

## 2024-01-12 ENCOUNTER — OFFICE VISIT (OUTPATIENT)
Dept: OBSTETRICS AND GYNECOLOGY | Facility: CLINIC | Age: 74
End: 2024-01-12
Payer: MEDICARE

## 2024-01-12 VITALS — DIASTOLIC BLOOD PRESSURE: 72 MMHG | SYSTOLIC BLOOD PRESSURE: 128 MMHG | BODY MASS INDEX: 17.03 KG/M2 | WEIGHT: 99.19 LBS

## 2024-01-12 DIAGNOSIS — R10.30 LOWER ABDOMINAL PAIN: Primary | ICD-10-CM

## 2024-01-12 PROCEDURE — 99999 PR PBB SHADOW E&M-EST. PATIENT-LVL II: CPT | Mod: PBBFAC,,, | Performed by: NURSE PRACTITIONER

## 2024-01-12 PROCEDURE — 3078F DIAST BP <80 MM HG: CPT | Mod: CPTII,S$GLB,, | Performed by: NURSE PRACTITIONER

## 2024-01-12 PROCEDURE — 1159F MED LIST DOCD IN RCRD: CPT | Mod: CPTII,S$GLB,, | Performed by: NURSE PRACTITIONER

## 2024-01-12 PROCEDURE — 99213 OFFICE O/P EST LOW 20 MIN: CPT | Mod: S$GLB,,, | Performed by: NURSE PRACTITIONER

## 2024-01-12 PROCEDURE — 3288F FALL RISK ASSESSMENT DOCD: CPT | Mod: CPTII,S$GLB,, | Performed by: NURSE PRACTITIONER

## 2024-01-12 PROCEDURE — 1101F PT FALLS ASSESS-DOCD LE1/YR: CPT | Mod: CPTII,S$GLB,, | Performed by: NURSE PRACTITIONER

## 2024-01-12 PROCEDURE — 3074F SYST BP LT 130 MM HG: CPT | Mod: CPTII,S$GLB,, | Performed by: NURSE PRACTITIONER

## 2024-01-12 PROCEDURE — 1126F AMNT PAIN NOTED NONE PRSNT: CPT | Mod: CPTII,S$GLB,, | Performed by: NURSE PRACTITIONER

## 2024-01-12 NOTE — PROGRESS NOTES
History & Physical  Gynecology      SUBJECTIVE:     Chief Complaint: Follow Up       History of Present Illness: Patient presents to clinic for pelvic US FU. Persistent abdominal pain, especially postprandial. She is scheduled with gastro for FU, also scheduled with [facility].         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        COVID test at [facility] on -     OB History          0    Para   0    Term   0       0    AB   0    Living   0         SAB   0    IAB   0    Ectopic   0    Multiple   0    Live Births   0               Family History   Problem Relation Age of Onset    Cataracts Mother     Cancer Father         leukemia    Amblyopia Neg Hx     Blindness Neg Hx     Diabetes Neg Hx     Glaucoma Neg Hx     Hypertension Neg Hx     Macular degeneration Neg Hx     Retinal detachment Neg Hx     Strabismus Neg Hx     Stroke Neg Hx     Thyroid disease Neg Hx     Breast cancer Neg Hx     Colon cancer Neg Hx     Ovarian cancer Neg Hx     Celiac disease Neg Hx     Cirrhosis Neg Hx     Colon polyps Neg Hx     Crohn's disease Neg Hx     Esophageal cancer Neg Hx     Inflammatory bowel disease Neg Hx     Liver cancer Neg Hx     Rectal cancer Neg Hx     Liver disease Neg Hx     Stomach cancer Neg Hx     Ulcerative colitis Neg Hx     Pancreatic cancer Neg Hx       Social History     Tobacco Use    Smoking status: Never    Smokeless tobacco: Never   Substance Use Topics    Alcohol use: No    Drug use: No       Current Outpatient Medications   Medication Sig    chlorhexidine (PERIDEX) 0.12 % solution     dicyclomine (BENTYL) 20 mg tablet TAKE 1 TABLET BY MOUTH 3 TIMES A DAY AS NEEDED FOR ABDOMINAL PAIN    esomeprazole (NEXIUM) 40 MG capsule Take 1 capsule (40 mg total) by mouth once daily.    hyoscyamine (ANASPAZ,LEVSIN) 0.125 mg Tab Take 1 tablet (125 mcg total) by mouth every 6 (six) hours as needed (abdominal pain).    multivitamin (THERAGRAN) per tablet Take 1 tablet by mouth Daily.     No current facility-administered medications for this visit.         Review of Systems:  Review of Systems   Constitutional:  Negative for activity change, appetite change, chills, fatigue and fever.   HENT:  Negative for mouth sores.    Eyes:  Negative for visual disturbance.   Respiratory:  Negative for cough and shortness of breath.    Cardiovascular:  Negative for chest pain and leg swelling.   Gastrointestinal:  Positive for abdominal pain. Negative for constipation, diarrhea, nausea, vomiting and reflux.   Endocrine: Negative for hyperthyroidism and hypothyroidism.   Genitourinary:  Negative for dysuria, flank pain, frequency, genital sores, hematuria, menstrual problem, pelvic pain, vaginal bleeding, vaginal discharge, vaginal pain, vaginal dryness and vaginal odor.   Musculoskeletal:  Negative for arthralgias, back pain and myalgias.   Integumentary:  Negative for rash, breast mass and breast tenderness.   Neurological:  Negative for headaches.   Hematological:  Negative for adenopathy. Does not bruise/bleed easily.   Psychiatric/Behavioral:  Negative for depression. The patient is not nervous/anxious.    Breast: Negative for asymmetry, mass and tenderness       OBJECTIVE:     Physical Exam:  Physical Exam  Constitutional:       General: She is not in acute distress.      Appearance: She is well-developed. She is not diaphoretic.   HENT:      Head: Normocephalic and atraumatic.      Nose: Nose normal.   Eyes:      Conjunctiva/sclera: Conjunctivae normal.      Pupils: Pupils are equal, round, and reactive to light.   Neck:      Thyroid: No thyromegaly.      Vascular: No JVD.      Trachea: No tracheal deviation.   Cardiovascular:      Rate and Rhythm: Normal rate and regular rhythm.      Heart sounds: Normal heart sounds. No murmur heard.     No friction rub. No gallop.   Pulmonary:      Effort: Pulmonary effort is normal. No respiratory distress.      Breath sounds: Normal breath sounds. No stridor. No wheezing or rales.   Chest:      Chest wall: No tenderness.   Abdominal:      General: Bowel sounds are normal. There is no distension.      Palpations: Abdomen is soft. There is no mass.      Tenderness: There is no abdominal tenderness. There is no guarding or rebound.      Hernia: No hernia is present.   Genitourinary:     Vagina: Normal.      Comments: Deferred  Musculoskeletal:         General: No tenderness. Normal range of motion.      Cervical back: Normal range of motion and neck supple.   Lymphadenopathy:      Cervical: No cervical adenopathy.   Skin:     General: Skin is warm and dry.      Capillary Refill: Capillary refill takes less than 2 seconds.      Coloration: Skin is not pale.      Findings: No erythema or rash.   Neurological:      Mental Status: She is alert and oriented to person, place, and time.      Sensory: No sensory deficit.      Motor: No abnormal muscle tone.      Coordination: Coordination normal.      Deep Tendon Reflexes: Reflexes normal.   Psychiatric:         Behavior: Behavior normal.         Thought Content: Thought content normal.         Judgment: Judgment normal.           ASSESSMENT:       ICD-10-CM ICD-9-CM    1. Lower abdominal pain  R10.30 789.09              Plan:     Pelvic US with noted small simple cyst of the ovary. Recommended continued  FU with GI for assessment of persistent abdominal pain.    Fu with NP as needed.    Counseling time: 20 minutes       YOHANNES McmillanC

## 2024-01-18 ENCOUNTER — OFFICE VISIT (OUTPATIENT)
Dept: SURGERY | Facility: CLINIC | Age: 74
End: 2024-01-18
Payer: MEDICARE

## 2024-01-18 VITALS
SYSTOLIC BLOOD PRESSURE: 126 MMHG | WEIGHT: 99.88 LBS | DIASTOLIC BLOOD PRESSURE: 80 MMHG | HEART RATE: 86 BPM | BODY MASS INDEX: 17.14 KG/M2

## 2024-01-18 DIAGNOSIS — K62.89 RECTAL PAIN: ICD-10-CM

## 2024-01-18 DIAGNOSIS — K76.0 FATTY LIVER: Primary | ICD-10-CM

## 2024-01-18 DIAGNOSIS — K64.9 HEMORRHOIDS, UNSPECIFIED HEMORRHOID TYPE: Primary | ICD-10-CM

## 2024-01-18 PROCEDURE — 3074F SYST BP LT 130 MM HG: CPT | Mod: HCNC,CPTII,S$GLB, | Performed by: NURSE PRACTITIONER

## 2024-01-18 PROCEDURE — 3288F FALL RISK ASSESSMENT DOCD: CPT | Mod: HCNC,CPTII,S$GLB, | Performed by: NURSE PRACTITIONER

## 2024-01-18 PROCEDURE — 1160F RVW MEDS BY RX/DR IN RCRD: CPT | Mod: HCNC,CPTII,S$GLB, | Performed by: NURSE PRACTITIONER

## 2024-01-18 PROCEDURE — 99214 OFFICE O/P EST MOD 30 MIN: CPT | Mod: HCNC,25,S$GLB, | Performed by: NURSE PRACTITIONER

## 2024-01-18 PROCEDURE — 1159F MED LIST DOCD IN RCRD: CPT | Mod: HCNC,CPTII,S$GLB, | Performed by: NURSE PRACTITIONER

## 2024-01-18 PROCEDURE — 3079F DIAST BP 80-89 MM HG: CPT | Mod: HCNC,CPTII,S$GLB, | Performed by: NURSE PRACTITIONER

## 2024-01-18 PROCEDURE — 1101F PT FALLS ASSESS-DOCD LE1/YR: CPT | Mod: HCNC,CPTII,S$GLB, | Performed by: NURSE PRACTITIONER

## 2024-01-18 PROCEDURE — 1125F AMNT PAIN NOTED PAIN PRSNT: CPT | Mod: HCNC,CPTII,S$GLB, | Performed by: NURSE PRACTITIONER

## 2024-01-18 PROCEDURE — 46606 ANOSCOPY AND BIOPSY: CPT | Mod: HCNC,CPTII,S$GLB, | Performed by: NURSE PRACTITIONER

## 2024-01-18 PROCEDURE — 99999 PR PBB SHADOW E&M-EST. PATIENT-LVL III: CPT | Mod: PBBFAC,HCNC,, | Performed by: NURSE PRACTITIONER

## 2024-01-18 RX ORDER — HYDROCORTISONE 25 MG/G
CREAM TOPICAL 2 TIMES DAILY
Qty: 30 G | Refills: 1 | Status: SHIPPED | OUTPATIENT
Start: 2024-01-18

## 2024-01-18 NOTE — PATIENT INSTRUCTIONS
Trial citrucel fiber pills, 1-2 pills a day to help with preventing constipation  Try calmoseptine cream on OUTSIDE of anus to help with discomfort  Try anusol cream inside rectum twice a day for two weeks

## 2024-01-18 NOTE — PROGRESS NOTES
CRS Office Visit History and Physical    Referring Md:   Kasia Garcia Md  [address] [facility] [place] [address]    SUBJECTIVE:     Chief Complaint: rectal pain    History of Present Illness:  The patient is new patient to this practice.   Course is as follows:  Patient is a 73 y.o. female presents with recal pain. Follows Dr. Franco for abd pain and bloating in GI , last seen 10/2023. She has an upcoming egd and c scope w him.   Today she states she is here for rectal pain, present for a few weeks.   Denies rectal bleeding.   Has a BM 1-2x a day.   Denies diarrhea  Occasional straining.   Takes miralax PRN    Last Colonoscopy: 2020, with hemorrhoids and polyps removed  Family history of colorectal cancer or IBD: no.    Review of patient's allergies indicates:   Allergen Reactions    Ciprofloxacin      palpitations    Flagyl [metronidazole hcl]      Cause urinary frequency       Past Medical History:   Diagnosis Date    Arthritis     Multiple thyroid nodules     Osteoporosis     Reflux     Senile nuclear sclerosis - Both Eyes 10/1/2013     Past Surgical History:   Procedure Laterality Date    COLONOSCOPY N/A 9/14/2020    Procedure: COLONOSCOPY;  Surgeon: Herminio Franco MD;  Location: [facility] ([address] [place] [address]);  Service: Endoscopy;  Laterality: N/A;  Patient would like colonoscopy 1st case of the day with me  Please recommend MiraLax 17 g in 12 oz of water 3 days in 2 days prior  With a split bowel prep.        COVID test at [facility] on 9/11-GT     Family History   Problem Relation Age of Onset    Cataracts Mother     Cancer Father         leukemia    Amblyopia Neg Hx     Blindness Neg Hx     Diabetes Neg Hx     Glaucoma Neg Hx     Hypertension Neg Hx     Macular degeneration Neg Hx     Retinal detachment Neg Hx     Strabismus Neg Hx     Stroke Neg Hx     Thyroid disease Neg Hx     Breast cancer Neg Hx     Colon cancer Neg Hx     Ovarian cancer Neg Hx     Celiac disease Neg Hx     Cirrhosis Neg Hx      Colon polyps Neg Hx     Crohn's disease Neg Hx     Esophageal cancer Neg Hx     Inflammatory bowel disease Neg Hx     Liver cancer Neg Hx     Rectal cancer Neg Hx     Liver disease Neg Hx     Stomach cancer Neg Hx     Ulcerative colitis Neg Hx     Pancreatic cancer Neg Hx      Social History     Tobacco Use    Smoking status: Never    Smokeless tobacco: Never   Substance Use Topics    Alcohol use: No    Drug use: No        Review of Systems:  ROS    OBJECTIVE:     Vital Signs (Most Recent)  /80   Pulse 86   Wt 45.3 kg (99 lb 13.9 oz)   LMP 08/29/1998   BMI 17.14 kg/m²     Physical Exam:  General: Other female in no distress   Neuro: Alert and oriented to person, place, and time.  Moves all extremities.     HEENT: No icterus.  Trachea midline  Respiratory: Respirations are even and unlabored, no cough or audible wheezing  Skin: Warm dry and intact, No visible rashes, no jaundice    Labs reviewed today:  Lab Results   Component Value Date    WBC 4.47 10/31/2023    HGB 14.0 10/31/2023    HCT 43.7 10/31/2023     10/31/2023    CHOL 232 (H) 03/07/2023    TRIG 106 03/07/2023    HDL 95 (H) 03/07/2023    ALT 21 10/31/2023    AST 31 10/31/2023     10/31/2023    K 4.5 10/31/2023     10/31/2023    CREATININE 0.8 10/31/2023    BUN 13 10/31/2023    CO2 24 10/31/2023    TSH 1.736 10/31/2023    PSA <0.01 09/24/2014    INR 1.0 01/17/2004    HGBA1C 5.6 11/17/2015       Imaging reviewed today:  Cte 10/30/2023    1. No acute abdominopelvic abnormality.  The appendix is not definitely visualized.  2. Narrowing of the aorta mesenteric angle without evidence of bowel obstruction.  3. Hepatic steatosis.  4. Additional findings as detailed in the body of the report.    Endoscopy reviewed today:  See hpi    Anorectal Exam:    Anal Skin: Normal    Digital Rectal Exam:  Resting Tone normal  Mass none  Tenderness  absent    Anoscopy:  Verbal consent was obtained.   Clear plastic anoscope inserted.    Hemorrhoids   present w capillary changes  Stigmata of bleeding  absent  Stigmata of prolapsed  absent  Distal rectal mucosa normal          ASSESSMENT/PLAN:     Diagnoses and all orders for this visit:    Hemorrhoids, unspecified hemorrhoid type  -     Ambulatory referral/consult to Colorectal Surgery  -     hydrocortisone (ANUSOL-HC) 2.5 % rectal cream; Place rectally 2 (two) times daily.    Rectal pain    73 y.o. F here or rectal pain, nothing found on exam to account for her rectal pain.   Did have some hemorrhoids internally, but I'm not sure that's contributing to her symptoms, very less likely    The patient was instructed to:  Calmoseptine to the outside  Anusol BID to internal hemorrhoids  Citrucel fiber    F/u PRN    BRONWYN English  Colon and Rectal Surgery

## 2024-01-24 ENCOUNTER — OFFICE VISIT (OUTPATIENT)
Dept: HEPATOLOGY | Facility: CLINIC | Age: 74
End: 2024-01-24
Payer: MEDICARE

## 2024-01-24 ENCOUNTER — PROCEDURE VISIT (OUTPATIENT)
Dept: HEPATOLOGY | Facility: CLINIC | Age: 74
End: 2024-01-24
Payer: MEDICARE

## 2024-01-24 VITALS — WEIGHT: 99.63 LBS | BODY MASS INDEX: 17.01 KG/M2 | HEIGHT: 64 IN

## 2024-01-24 DIAGNOSIS — K76.0 FATTY LIVER: Primary | ICD-10-CM

## 2024-01-24 DIAGNOSIS — K76.0 FATTY LIVER: ICD-10-CM

## 2024-01-24 DIAGNOSIS — K76.9 LIVER LESION: ICD-10-CM

## 2024-01-24 PROBLEM — E88.9 METABOLIC DISORDER: Status: RESOLVED | Noted: 2017-07-08 | Resolved: 2024-01-24

## 2024-01-24 PROCEDURE — 99999 PR PBB SHADOW E&M-EST. PATIENT-LVL III: CPT | Mod: PBBFAC,HCNC,, | Performed by: NURSE PRACTITIONER

## 2024-01-24 PROCEDURE — 1101F PT FALLS ASSESS-DOCD LE1/YR: CPT | Mod: HCNC,CPTII,S$GLB, | Performed by: NURSE PRACTITIONER

## 2024-01-24 PROCEDURE — 76981 USE PARENCHYMA: CPT | Mod: HCNC,S$GLB,, | Performed by: NURSE PRACTITIONER

## 2024-01-24 PROCEDURE — 1126F AMNT PAIN NOTED NONE PRSNT: CPT | Mod: HCNC,CPTII,S$GLB, | Performed by: NURSE PRACTITIONER

## 2024-01-24 PROCEDURE — 1159F MED LIST DOCD IN RCRD: CPT | Mod: HCNC,CPTII,S$GLB, | Performed by: NURSE PRACTITIONER

## 2024-01-24 PROCEDURE — 1160F RVW MEDS BY RX/DR IN RCRD: CPT | Mod: HCNC,CPTII,S$GLB, | Performed by: NURSE PRACTITIONER

## 2024-01-24 PROCEDURE — 99214 OFFICE O/P EST MOD 30 MIN: CPT | Mod: HCNC,S$GLB,, | Performed by: NURSE PRACTITIONER

## 2024-01-24 PROCEDURE — 3288F FALL RISK ASSESSMENT DOCD: CPT | Mod: HCNC,CPTII,S$GLB, | Performed by: NURSE PRACTITIONER

## 2024-01-24 NOTE — PATIENT INSTRUCTIONS
Given normal lab values, no significant fatty liver and no fibrosis on fibroscan - no need for hepatology f/u. Can repeat fibroscan and visit in 2 years if interested but likely no long term follow up needed

## 2024-01-24 NOTE — PROGRESS NOTES
[facility] VISIT NEW PT NOTE    REFERRING PROVIDER:  Dr. Herminio Franco  PCP: Kasia Garcia MD     CHIEF COMPLAINT: fatty liver (?), liver lesions?     HPI: This is a 73 y.o. female with PMH noted below, presenting for evaluation of  above    Previous serologic w/u negative for  viral hepatitis B and C    Prior serologic workup:   Lab Results   Component Value Date    FERRITIN 188 06/16/2016    FESATURATED 29 06/16/2016    HEPBSAG Negative 11/18/2020    HEPCAB Negative 11/18/2020     No fatty liver risk factors     Liver fibrosis staging:  -- fibroscan 1/2024 noted F0, S0 (kPA 4.9, )    Interval HPI: Presents today alone. No fatty liver risk factors. US notes fatty liver but fibroscan notes minimal fatty liver, <10%. No fibrosis     Labs done 10/2023 show normal transaminase levels     Lab Results   Component Value Date    ALT 21 10/31/2023    AST 31 10/31/2023    ALKPHOS 61 10/31/2023    BILITOT 0.8 10/31/2023    ALBUMIN 4.2 10/31/2023    INR 1.0 01/17/2004     10/31/2023       Abd CT done 11/2023 showed Diffuse hypoattenuation suggestive of steatosis.  Multiple stable hypodense foci (series 2, image 42, 48, 84, 101), too small to characterize.     Denies family history of liver disease. Denies Alcohol consumption, see below  Social History     Substance and Sexual Activity   Alcohol Use No            Allergy and medication list reviewed and updated     PMHX:  has a past medical history of Arthritis, Multiple thyroid nodules, Osteoporosis, Reflux, and 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    PSHX:  has a past surgical history that includes Colonoscopy (N/A, 9/14/2020).    FAMILY HISTORY: Updated and reviewed in EPIC    ROS:   GENERAL: Denies fatigue  CARDIOVASCULAR: Denies edema  GI: Denies abdominal pain  SKIN: Denies rash, itching   NEURO: Denies confusion, memory loss, or mood changes    PHYSICAL EXAM:   Friendly Other female, in no acute distress; alert and  "oriented to person, place and time  VITALS: Ht 5' 4" (1.626 m)   Wt 45.2 kg (99 lb 10.4 oz)   LMP 08/29/1998   BMI 17.10 kg/m²   EYES: Sclerae anicteric  GI: Soft, non-tender, non-distended. No ascites.  EXTREMITIES:  No edema.  SKIN: Warm and dry. No jaundice. No telangectasias noted. No palmar erythema.  NEURO:  No asterixis.  PSYCH:  Thought and speech pattern appropriate. Behavior normal      EDUCATION:  See instructions discussed with patient in Instructions section of the After Visit Summary     ASSESSMENT & PLAN:  73 y.o. female with:  1.  Fatty liver (?)  No NAFLD risk factors, does not drink alcohol. Fibroscan with <10% fat in the liver, no fibrosis. Can repeat fibroscan in 2 years but likely no chronic f/u needed  - see HPI  -- Immunity to Hep A and B : see HPI  -- Fibroscan 1/2024 F0, S0   -- Recommendations discussed with patient:  Avoid significant weight gain   3. Low carb/sugar, high fiber and protein diet, limit your carb intake to LESS than 30-45 grams of carbs with a meal or LESS than 5-10 grams with any snack   4. Exercise, 5 days per week, 30 minutes per day, as tolerated  5. Recommend good cholesterol, blood pressure, blood sugar levels     2. Liver lesions?  Noted on CT, unclear significance. Offered to arrange MRI for further investigation but  pt not interested       Follow up in about 2 years (around 1/24/2026). with fibroscan  No orders of the defined types were placed in this encounter.       Thank you for allowing me to participate in the care of Maria Teresatony VIVIANE Londono    I spent a total of 30 minutes on the day of the visit.This includes face to face time and non-face to face time preparing to see the patient (eg, review of tests), obtaining and/or reviewing separately obtained history, documenting clinical information in the electronic or other health record, independently interpreting results and communicating results to the patient/family/caregiver, and coordinating " care.         CC'ed note to:   Herminio Franco MD [place], Elizabeth, MD

## 2024-01-24 NOTE — PROCEDURES
FibroScan [place] (Vibration Controlled Transient Elastography)    Date/Time: 1/24/2024 1:45 PM    Performed by: Gianna Jacobsen NP  Authorized by: Gianna Jacobsen, ALEKSANDR    Diagnosis:  NAFLD    Probe:  M    Universal Protocol: Patient's identity, procedure and site were verified, confirmatory pause was performed.  Discussed procedure including risks and potential complications.  Questions answered.  Patient verbalizes understanding and wishes to proceed with VCTE.     Procedure: After providing explanations of the procedure, patient was placed in the supine position with right arm in maximum abduction to allow optimal exposure of right lateral abdomen.  Patient was briefly assessed, Testing was performed in the mid-axillary location, 50Hz Shear Wave pulses were applied and the resulting Shear Wave and Propagation Speed detected with a 3.5 MHz ultrasonic signal, using the FibroScan probe, Skin to liver capsule distance and liver parenchyma were accessed during the entire examination with the FibroScan probe, Patient was instructed to breathe normally and to abstain from sudden movements during the procedure, allowing for random measurements of liver stiffness. At least 10 Shear Waves were produced, Individual measurements of each Shear Wave were calculated.  Patient tolerated the procedure well with no complications.  Meets discharge criteria as was dismissed.  Rates pain 0 out of 10.  Patient will follow up with ordering provider to review results.    Findings  Median liver stiffness score:  4.9  CAP Reading: dB/m:  230    IQR/med %:  12  Interpretation  Fibrosis interpretation is based on medial liver stiffness - Kilopascal (kPa).    Fibrosis Stage:  F 0-1  Steatosis interpretation is based on controlled attenuation parameter - (dB/m).    Steatosis Grade:  <S1

## 2024-01-28 NOTE — TELEPHONE ENCOUNTER
Refill Routing Note   Medication(s) are not appropriate for processing by [facility] for the following reason(s):        Non-participating provider    [facility] action(s):  Route               Appointments  past 12m or future 3m with PCP    Date Provider   Last Visit   1/11/2024 Kasia Garcia MD   Next Visit   Visit date not found Kasia Garcia MD   ED visits in past 90 days: 0        Note composed:4:43 PM 01/28/2024

## 2024-01-29 RX ORDER — DICYCLOMINE HYDROCHLORIDE 20 MG/1
TABLET ORAL
Qty: 120 TABLET | Refills: 1 | Status: SHIPPED | OUTPATIENT
Start: 2024-01-29

## 2024-02-29 ENCOUNTER — HOSPITAL ENCOUNTER (OUTPATIENT)
Facility: HOSPITAL | Age: 74
Discharge: HOME | End: 2024-02-29
Attending: INTERNAL MEDICINE | Admitting: INTERNAL MEDICINE
Payer: MEDICARE

## 2024-02-29 ENCOUNTER — ANESTHESIA EVENT (OUTPATIENT)
Dept: ENDOSCOPY | Facility: HOSPITAL | Age: 74
End: 2024-02-29
Payer: MEDICARE

## 2024-02-29 ENCOUNTER — ANESTHESIA (OUTPATIENT)
Dept: ENDOSCOPY | Facility: HOSPITAL | Age: 74
End: 2024-02-29
Payer: MEDICARE

## 2024-02-29 VITALS
RESPIRATION RATE: 18 BRPM | BODY MASS INDEX: 16.9 KG/M2 | DIASTOLIC BLOOD PRESSURE: 56 MMHG | OXYGEN SATURATION: 100 % | WEIGHT: 99 LBS | HEIGHT: 64 IN | TEMPERATURE: 98 F | SYSTOLIC BLOOD PRESSURE: 123 MMHG | HEART RATE: 85 BPM

## 2024-02-29 DIAGNOSIS — Z86.010 PERSONAL HISTORY OF COLONIC POLYPS: ICD-10-CM

## 2024-02-29 DIAGNOSIS — K64.8 INTERNAL HEMORRHOIDS: Primary | ICD-10-CM

## 2024-02-29 PROCEDURE — 37000009 HC ANESTHESIA EA ADD 15 MINS: Mod: HCNC | Performed by: INTERNAL MEDICINE

## 2024-02-29 PROCEDURE — G0105 COLORECTAL SCRN; HI RISK IND: HCPCS | Mod: HCNC,,, | Performed by: INTERNAL MEDICINE

## 2024-02-29 PROCEDURE — 37000008 HC ANESTHESIA 1ST 15 MINUTES: Mod: HCNC | Performed by: INTERNAL MEDICINE

## 2024-02-29 PROCEDURE — 88305 TISSUE EXAM BY PATHOLOGIST: CPT | Mod: HCNC | Performed by: PATHOLOGY

## 2024-02-29 PROCEDURE — 25000003 PHARM REV CODE 250: Mod: HCNC | Performed by: INTERNAL MEDICINE

## 2024-02-29 PROCEDURE — 43239 EGD BIOPSY SINGLE/MULTIPLE: CPT | Mod: 51,HCNC,, | Performed by: INTERNAL MEDICINE

## 2024-02-29 PROCEDURE — G0105 COLORECTAL SCRN; HI RISK IND: HCPCS | Mod: HCNC | Performed by: INTERNAL MEDICINE

## 2024-02-29 PROCEDURE — 25000003 PHARM REV CODE 250: Mod: HCNC | Performed by: NURSE ANESTHETIST, CERTIFIED REGISTERED

## 2024-02-29 PROCEDURE — E9220 PRA ENDO ANESTHESIA: HCPCS | Mod: HCNC,,, | Performed by: NURSE ANESTHETIST, CERTIFIED REGISTERED

## 2024-02-29 PROCEDURE — 82657 ENZYME CELL ACTIVITY: CPT | Mod: HCNC | Performed by: STUDENT IN AN ORGANIZED HEALTH CARE EDUCATION/TRAINING PROGRAM

## 2024-02-29 PROCEDURE — 27201012 HC FORCEPS, HOT/COLD, DISP: Mod: HCNC | Performed by: INTERNAL MEDICINE

## 2024-02-29 PROCEDURE — 63600175 PHARM REV CODE 636 W HCPCS: Mod: HCNC | Performed by: NURSE ANESTHETIST, CERTIFIED REGISTERED

## 2024-02-29 PROCEDURE — 88305 TISSUE EXAM BY PATHOLOGIST: CPT | Mod: 26,HCNC,, | Performed by: PATHOLOGY

## 2024-02-29 PROCEDURE — 43239 EGD BIOPSY SINGLE/MULTIPLE: CPT | Mod: HCNC | Performed by: INTERNAL MEDICINE

## 2024-02-29 RX ORDER — SODIUM CHLORIDE 9 MG/ML
INJECTION, SOLUTION INTRAVENOUS CONTINUOUS
Status: DISCONTINUED | OUTPATIENT
Start: 2024-02-29 | End: 2024-02-29 | Stop reason: HOSPADM

## 2024-02-29 RX ORDER — PHENYLEPHRINE HYDROCHLORIDE 10 MG/ML
INJECTION INTRAVENOUS
Status: DISCONTINUED | OUTPATIENT
Start: 2024-02-29 | End: 2024-02-29

## 2024-02-29 RX ORDER — PROPOFOL 10 MG/ML
VIAL (ML) INTRAVENOUS
Status: DISCONTINUED | OUTPATIENT
Start: 2024-02-29 | End: 2024-02-29

## 2024-02-29 RX ORDER — LIDOCAINE HYDROCHLORIDE 20 MG/ML
INJECTION INTRAVENOUS
Status: DISCONTINUED | OUTPATIENT
Start: 2024-02-29 | End: 2024-02-29

## 2024-02-29 RX ORDER — HYDROCORTISONE ACETATE 25 MG/1
25 SUPPOSITORY RECTAL EVERY 12 HOURS PRN
Qty: 12 SUPPOSITORY | Refills: 0 | Status: SHIPPED | OUTPATIENT
Start: 2024-02-29 | End: 2024-03-06

## 2024-02-29 RX ORDER — PROPOFOL 10 MG/ML
INJECTION, EMULSION INTRAVENOUS CONTINUOUS PRN
Status: DISCONTINUED | OUTPATIENT
Start: 2024-02-29 | End: 2024-02-29

## 2024-02-29 RX ADMIN — PROPOFOL 250 MCG/KG/MIN: 10 INJECTION, EMULSION INTRAVENOUS at 11:02

## 2024-02-29 RX ADMIN — PHENYLEPHRINE HYDROCHLORIDE 100 MCG: 10 INJECTION INTRAVENOUS at 11:02

## 2024-02-29 RX ADMIN — PROPOFOL 100 MG: 10 INJECTION, EMULSION INTRAVENOUS at 11:02

## 2024-02-29 RX ADMIN — LIDOCAINE HYDROCHLORIDE 75 MG: 20 INJECTION INTRAVENOUS at 11:02

## 2024-02-29 RX ADMIN — SODIUM CHLORIDE: 0.9 INJECTION, SOLUTION INTRAVENOUS at 09:02

## 2024-02-29 RX ADMIN — GLYCOPYRROLATE 0.2 MG: 0.2 INJECTION, SOLUTION INTRAMUSCULAR; INTRAVENOUS at 11:02

## 2024-02-29 NOTE — PROVATION PATIENT INSTRUCTIONS
Discharge Summary/Instructions after an Endoscopic Procedure  Patient Name: Marshall Almanza  Patient MRN: [id number]  Patient YOB: 1950  Thursday, February 29, 2024  Herminio Franco MD  Dear patient,  As a result of recent federal legislation (The Federal Cures Act), you may   receive lab or pathology results from your procedure in your [facility]   account before your physician is able to contact you. Your physician or   their representative will relay the results to you with their   recommendations at their soonest availability.  Thank you,  RESTRICTIONS:  During your procedure today, you received medications for sedation.  These   medications may affect your judgment, balance and coordination.  Therefore,   for 24 hours, you have the following restrictions:   - DO NOT drive a car, operate machinery, make legal/financial decisions,   sign important papers or drink alcohol.    ACTIVITY:  Today: no heavy lifting, straining or running due to procedural   sedation/anesthesia.  The following day: return to full activity including work.  DIET:  Eat and drink normally unless instructed otherwise.     TREATMENT FOR COMMON SIDE EFFECTS:  - Mild abdominal pain, nausea, belching, bloating or excessive gas:  rest,   eat lightly and use a heating pad.  - Sore Throat: treat with throat lozenges and/or gargle with warm salt   water.  - Because air was used during the procedure, expelling large amounts of air   from your rectum or belching is normal.  - If a bowel prep was taken, you may not have a bowel movement for 1-3 days.    This is normal.  SYMPTOMS TO WATCH FOR AND REPORT TO YOUR PHYSICIAN:  1. Abdominal pain or bloating, other than gas cramps.  2. Chest pain.  3. Back pain.  4. Signs of infection such as: chills or fever occurring within 24 hours   after the procedure.  5. Rectal bleeding, which would show as bright red, maroon, or black stools.   (A tablespoon of blood from the rectum is not serious, especially  if   hemorrhoids are present.)  6. Vomiting.  7. Weakness or dizziness.  GO DIRECTLY TO THE NEAREST EMERGENCY ROOM IF YOU HAVE ANY OF THE FOLLOWING:      Difficulty breathing              Chills and/or fever over 101 F   Persistent vomiting and/or vomiting blood   Severe abdominal pain   Severe chest pain   Black, tarry stools   Bleeding- more than one tablespoon   Any other symptom or condition that you feel may need urgent attention  Your doctor recommends these additional instructions:  If any biopsies were taken, your doctors clinic will contact you in 1 to 2   weeks with any results.  - Discharge patient to home.   - Repeat colonoscopy in 5 years for surveillance.   - Return to GI clinic at the next available appointment.   - The findings and recommendations were discussed with the patient.  For questions, problems or results please call your physician - Herminio Franco MD at Work:  [phone].  [facility], EMERGENCY ROOM PHONE NUMBER: [phone]  IF A COMPLICATION OR EMERGENCY SITUATION ARISES AND YOU ARE UNABLE TO REACH   YOUR PHYSICIAN - GO DIRECTLY TO THE EMERGENCY ROOM.  Herminio Franco MD  2/29/2024 11:43:34 AM  This report has been verified and signed electronically.  Dear patient,  As a result of recent federal legislation (The Federal Cures Act), you may   receive lab or pathology results from your procedure in your [facility]   account before your physician is able to contact you. Your physician or   their representative will relay the results to you with their   recommendations at their soonest availability.  Thank you,  PROVATION

## 2024-02-29 NOTE — PLAN OF CARE
Patient updated on plan of care for procedure today.  All questions addressed verbalized understanding.

## 2024-02-29 NOTE — PROVATION PATIENT INSTRUCTIONS
Discharge Summary/Instructions after an Endoscopic Procedure  Patient Name: Marshall Almanza  Patient MRN: [id number]  Patient YOB: 1950  Thursday, February 29, 2024  Herminio Franco MD  Dear patient,  As a result of recent federal legislation (The Federal Cures Act), you may   receive lab or pathology results from your procedure in your [facility]   account before your physician is able to contact you. Your physician or   their representative will relay the results to you with their   recommendations at their soonest availability.  Thank you,  RESTRICTIONS:  During your procedure today, you received medications for sedation.  These   medications may affect your judgment, balance and coordination.  Therefore,   for 24 hours, you have the following restrictions:   - DO NOT drive a car, operate machinery, make legal/financial decisions,   sign important papers or drink alcohol.    ACTIVITY:  Today: no heavy lifting, straining or running due to procedural   sedation/anesthesia.  The following day: return to full activity including work.  DIET:  Eat and drink normally unless instructed otherwise.     TREATMENT FOR COMMON SIDE EFFECTS:  - Mild abdominal pain, nausea, belching, bloating or excessive gas:  rest,   eat lightly and use a heating pad.  - Sore Throat: treat with throat lozenges and/or gargle with warm salt   water.  - Because air was used during the procedure, expelling large amounts of air   from your rectum or belching is normal.  - If a bowel prep was taken, you may not have a bowel movement for 1-3 days.    This is normal.  SYMPTOMS TO WATCH FOR AND REPORT TO YOUR PHYSICIAN:  1. Abdominal pain or bloating, other than gas cramps.  2. Chest pain.  3. Back pain.  4. Signs of infection such as: chills or fever occurring within 24 hours   after the procedure.  5. Rectal bleeding, which would show as bright red, maroon, or black stools.   (A tablespoon of blood from the rectum is not serious, especially  if   hemorrhoids are present.)  6. Vomiting.  7. Weakness or dizziness.  GO DIRECTLY TO THE NEAREST EMERGENCY ROOM IF YOU HAVE ANY OF THE FOLLOWING:      Difficulty breathing              Chills and/or fever over 101 F   Persistent vomiting and/or vomiting blood   Severe abdominal pain   Severe chest pain   Black, tarry stools   Bleeding- more than one tablespoon   Any other symptom or condition that you feel may need urgent attention  Your doctor recommends these additional instructions:  If any biopsies were taken, your doctors clinic will contact you in 1 to 2   weeks with any results.  - Discharge patient to home.   - Await pathology results.   - Telephone endoscopist for pathology results in 3 weeks.   - Repeat upper endoscopy in 3 years for surveillance based on pathology   results.   - The findings and recommendations were discussed with the patient.  For questions, problems or results please call your physician - Herminio Franco MD at Work:  [phone].  [facility], EMERGENCY ROOM PHONE NUMBER: [phone]  IF A COMPLICATION OR EMERGENCY SITUATION ARISES AND YOU ARE UNABLE TO REACH   YOUR PHYSICIAN - GO DIRECTLY TO THE EMERGENCY ROOM.  Herminio Franco MD  2/29/2024 11:22:38 AM  This report has been verified and signed electronically.  Dear patient,  As a result of recent federal legislation (The Federal Cures Act), you may   receive lab or pathology results from your procedure in your [facility]   account before your physician is able to contact you. Your physician or   their representative will relay the results to you with their   recommendations at their soonest availability.  Thank you,  PROVATION

## 2024-02-29 NOTE — H&P
Short Stay Endoscopy History and Physical    PCP - Kasia Garcia MD    Procedure - EGD and colonoscopy  ASA - per anesthesia  Mallampati - per anesthesia  History of Anesthesia problems - no  Family history Anesthesia problems - no     HPI:  Marshall evans 73 y.o. female here for evaluation of post-prandial abdominal pain and surveillance of prior colon polyps.     ROS:  Constitutional: No fevers, chills, No weight loss  ENT: No allergies  CV: No chest pain  Pulm: No shortness of breath  GI: see HPI  Derm: No rash    Medical History:  has a past medical history of Arthritis, Multiple thyroid nodules, Osteoporosis, Reflux, and 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    Surgical History:  has a past surgical history that includes Colonoscopy (N/A, 9/14/2020).    Family History: family history includes Cancer in her father; Cataracts in her mother.. Otherwise no colon cancer, inflammatory bowel disease, or GI malignancies.    Social History:  reports that she has never smoked. She has never used smokeless tobacco. She reports that she does not drink alcohol and does not use drugs.    Review of patient's allergies indicates:   Allergen Reactions    Ciprofloxacin      palpitations    Flagyl [metronidazole hcl]      Cause urinary frequency       Medications:   Medications Prior to Admission   Medication Sig Dispense Refill Last Dose    chlorhexidine (PERIDEX) 0.12 % solution    Past Week    dicyclomine (BENTYL) 20 mg tablet TAKE 1 TABLET BY MOUTH 3 TIMES A DAY AS NEEDED FOR ABDOMINAL PAIN 120 tablet 1 Past Week    esomeprazole (NEXIUM) 40 MG capsule Take 1 capsule (40 mg total) by mouth once daily. 90 capsule 3 Past Month    hydrocortisone (ANUSOL-HC) 2.5 % rectal cream Place rectally 2 (two) times daily. 30 g 1 Past Week    hyoscyamine (ANASPAZ,LEVSIN) 0.125 mg Tab Take 1 tablet (125 mcg total) by mouth every 6 (six) hours as needed (abdominal pain). 90 tablet 1 Past Week    multivitamin (THERAGRAN) per  tablet Take 1 tablet by mouth Daily.   Past Week         Objective Findings:    Vital Signs: see nursing notes    Physical Exam:  General Appearance: Well appearing in no acute distress  Eyes:    No scleral icterus  ENT: Neck supple  Lungs: CTA anteriorly  Heart:  S1, S2 normal, no murmurs heard  Abdomen: Soft, non tender, non distended with positive bowel sounds. No hepatosplenomegaly, ascites, or mass  Extremities: no edema  Skin: No rash      Labs:  Lab Results   Component Value Date    WBC 4.47 10/31/2023    HGB 14.0 10/31/2023    HCT 43.7 10/31/2023     10/31/2023    CHOL 232 (H) 03/07/2023    TRIG 106 03/07/2023    HDL 95 (H) 03/07/2023    ALT 21 10/31/2023    AST 31 10/31/2023     10/31/2023    K 4.5 10/31/2023     10/31/2023    CREATININE 0.8 10/31/2023    BUN 13 10/31/2023    CO2 24 10/31/2023    TSH 1.736 10/31/2023    PSA <0.01 09/24/2014    INR 1.0 01/17/2004    HGBA1C 5.6 11/17/2015         Assessment:   Marshall Almanza is a 73 y.o. female presenting today for an EGD/colonoscopy.       Plan:   -Proceed with scheduled procedure today. I have explained the risks and benefits of endoscopy procedures to the patient including but not limited to bleeding, perforation, infection, and death.  -Evaluation and consent for anesthesia portion of this procedure completed by anesthesia team.         Melquiades Erickson MD, PGY-VI  Gastroenterology Fellow  [facility]

## 2024-02-29 NOTE — TRANSFER OF CARE
"Anesthesia Transfer of Care Note    Patient: Marshall Almanza    Procedure(s) Performed: Procedure(s) (LRB):  EGD (ESOPHAGOGASTRODUODENOSCOPY) (N/A)  COLONOSCOPY (N/A)    Patient location: GI    Anesthesia Type: general    Transport from OR: Transported from OR on room air with adequate spontaneous ventilation    Post pain: adequate analgesia    Post assessment: no apparent anesthetic complications    Post vital signs: stable    Level of consciousness: responds to stimulation    Nausea/Vomiting: no nausea/vomiting    Complications: none    Transfer of care protocol was followed      Last vitals: Visit Vitals  BP 97/53 (BP Location: Left arm, Patient Position: Lying)   Pulse 87   Temp 36.6 °C (97.9 °F) (Temporal)   Resp 18   Ht 5' 4" (1.626 m)   Wt 44.9 kg (99 lb)   LMP 08/29/1998   SpO2 100%   Breastfeeding No   BMI 16.99 kg/m²     "

## 2024-02-29 NOTE — ANESTHESIA PREPROCEDURE EVALUATION
02/29/2024  Marshall Almanza is a 73 y.o., female.    Past Medical History:   Diagnosis Date    Arthritis     Multiple thyroid nodules     Osteoporosis     Reflux     Senile nuclear sclerosis - Both Eyes 10/1/2013     Patient Active Problem List   Diagnosis    Laryngopharyngeal reflux (LPR)    DJD (degenerative joint disease)    Senile nuclear sclerosis - Both Eyes    Multiple thyroid nodules    Senile osteoporosis    Weight loss, unintentional    Chronic pain of left thumb    Abdominal pain    Hydronephrosis    Vaginal atrophy    Change in bowel habits    Protein-calorie malnutrition    Serrated polyp of colon    Aortic atherosclerosis    Poor posture    Decreased range of motion of neck    Fatty liver    Liver lesion     Review of patient's allergies indicates:   Allergen Reactions    Ciprofloxacin      palpitations    Flagyl [metronidazole hcl]      Cause urinary frequency     Social History     Socioeconomic History    Marital status: Single    Number of children: 0   Occupational History    Occupation:      Employer: [employer]   Tobacco Use    Smoking status: Never    Smokeless tobacco: Never   Substance and Sexual Activity    Alcohol use: No    Drug use: No    Sexual activity: Not Currently     Past Surgical History:   Procedure Laterality Date    COLONOSCOPY N/A 9/14/2020    Procedure: COLONOSCOPY;  Surgeon: Herminio Franco MD;  Location: [address];  Service: Endoscopy;  Laterality: N/A;  Patient would like colonoscopy 1st case of the day with me  Please recommend MiraLax 17 g in 12 oz of water 3 days in 2 days prior  With a split bowel prep.        COVID test at [facility] on 9/11-GT     No current facility-administered medications on file prior to encounter.     Current Outpatient Medications on File Prior to Encounter   Medication Sig Dispense Refill     chlorhexidine (PERIDEX) 0.12 % solution       esomeprazole (NEXIUM) 40 MG capsule Take 1 capsule (40 mg total) by mouth once daily. 90 capsule 3    hyoscyamine (ANASPAZ,LEVSIN) 0.125 mg Tab Take 1 tablet (125 mcg total) by mouth every 6 (six) hours as needed (abdominal pain). 90 tablet 1    multivitamin (THERAGRAN) per tablet Take 1 tablet by mouth Daily.       Pre-op Assessment    I have reviewed the NPO Status.      Review of Systems  Anesthesia Hx:               Denies Personal Hx of Anesthesia complications.                    Hematology/Oncology:  Hematology Normal                                     EENT/Dental:  EENT/Dental Normal           Cardiovascular:  Cardiovascular Normal                                            Pulmonary:  Pulmonary Normal                       Renal/:             Other Renal / Gu Conditions: Hydronephrosis  Hepatic/GI:         Liver Disease, Fatty Liver        Musculoskeletal:  Arthritis               Neurological:  Neurology Normal                                      Endocrine:  Endocrine Normal            Dermatological:  Skin Normal    Psych:  Psychiatric Normal                       Anesthesia Plan  Type of Anesthesia, risks & benefits discussed:    Anesthesia Type: Gen Natural Airway  Intra-op Monitoring Plan: Standard ASA Monitors  Induction:  IV  Informed Consent: Informed consent signed with the Patient and all parties understand the risks and agree with anesthesia plan.  All questions answered.   ASA Score: 2    Ready For Surgery From Anesthesia Perspective.     .

## 2024-03-01 NOTE — ANESTHESIA POSTPROCEDURE EVALUATION
Anesthesia Post Evaluation    Patient: Marshall Almanza    Procedure(s) Performed: Procedure(s) (LRB):  EGD (ESOPHAGOGASTRODUODENOSCOPY) (N/A)  COLONOSCOPY (N/A)    Final Anesthesia Type: general      Patient location during evaluation: GI PACU  Patient participation: Yes- Able to Participate  Level of consciousness: awake and alert  Post-procedure vital signs: reviewed and stable  Pain management: adequate  Airway patency: patent  ANABELL mitigation strategies: Multimodal analgesia, Preoperative use of mandibular advancement devices or oral appliances and Intraoperative administration of CPAP, nasopharyngeal airway, or oral appliance during sedation  PONV status at discharge: No PONV  Anesthetic complications: no      Cardiovascular status: blood pressure returned to baseline, hemodynamically stable and stable  Respiratory status: unassisted and spontaneous ventilation  Hydration status: euvolemic  Follow-up not needed.              Vitals Value Taken Time   /56 02/29/24 1215   Temp 36.5 °C (97.7 °F) 02/29/24 1148   Pulse 85 02/29/24 1215   Resp 18 02/29/24 1215   SpO2 100 % 02/29/24 1215         No case tracking events are documented in the log.      Pain/Vitor Score: Vitor Score: 10 (2/29/2024 11:53 AM)

## 2024-03-05 LAB
FINAL PATHOLOGIC DIAGNOSIS: NORMAL
FINAL PATHOLOGIC DIAGNOSIS: NORMAL
GROSS: NORMAL
Lab: NORMAL
Lab: NORMAL

## 2024-03-05 NOTE — PROGRESS NOTES
History & Physical  Gynecology      SUBJECTIVE:     Chief Complaint: Abdominal Pain       History of Present Illness: Patient presents to clinic for assessment of abdominal pain. Longstanding postprandial pain and bloating, she has seen GI. Recommendation for assessment by GYN team additionally to rule out GYN etiology. No  or abnormal vaginal discharge. No reported urinary symptoms.        Review of patient's allergies indicates:   Allergen Reactions    Ciprofloxacin      palpitations    Flagyl [metronidazole hcl]      Cause urinary frequency       Past Medical History:   Diagnosis Date    Arthritis     Multiple thyroid nodules     Osteoporosis     Reflux     Senile nuclear sclerosis - Both Eyes 10/1/2013     Past Surgical History:   Procedure Laterality Date    COLONOSCOPY N/A 2020    Procedure: COLONOSCOPY;  Surgeon: Herminio Franco MD;  Location: [facility] ([address] [place] [address]);  Service: Endoscopy;  Laterality: N/A;  Patient would like colonoscopy 1st case of the day with me  Please recommend MiraLax 17 g in 12 oz of water 3 days in 2 days prior  With a split bowel prep.        COVID test at [facility] on -GT    COLONOSCOPY N/A 2024    Procedure: COLONOSCOPY;  Surgeon: Herminio Franco MD;  Location: [facility] ([address] [place] [address]);  Service: Endoscopy;  Laterality: N/A;    ESOPHAGOGASTRODUODENOSCOPY N/A 2024    Procedure: EGD (ESOPHAGOGASTRODUODENOSCOPY);  Surgeon: Herminio Franco MD;  Location: [facility] ([address] [place] [address]);  Service: Endoscopy;  Laterality: N/A;  Procedure: EGD/Colonoscopy   golytely  -precall complete-MS  -pt notified of earlier arrival time (0915am)-[facility]     OB History          0    Para   0    Term   0       0    AB   0    Living   0         SAB   0    IAB   0    Ectopic   0    Multiple   0    Live Births   0               Family History   Problem Relation Age of Onset    Cataracts Mother     Cancer Father         leukemia    Amblyopia Neg Hx     Blindness Neg Hx      Diabetes Neg Hx     Glaucoma Neg Hx     Hypertension Neg Hx     Macular degeneration Neg Hx     Retinal detachment Neg Hx     Strabismus Neg Hx     Stroke Neg Hx     Thyroid disease Neg Hx     Breast cancer Neg Hx     Colon cancer Neg Hx     Ovarian cancer Neg Hx     Celiac disease Neg Hx     Cirrhosis Neg Hx     Colon polyps Neg Hx     Crohn's disease Neg Hx     Esophageal cancer Neg Hx     Inflammatory bowel disease Neg Hx     Liver cancer Neg Hx     Rectal cancer Neg Hx     Liver disease Neg Hx     Stomach cancer Neg Hx     Ulcerative colitis Neg Hx     Pancreatic cancer Neg Hx      Social History     Tobacco Use    Smoking status: Never    Smokeless tobacco: Never   Substance Use Topics    Alcohol use: No    Drug use: No       Current Outpatient Medications   Medication Sig    esomeprazole (NEXIUM) 40 MG capsule Take 1 capsule (40 mg total) by mouth once daily.    hyoscyamine (ANASPAZ,LEVSIN) 0.125 mg Tab Take 1 tablet (125 mcg total) by mouth every 6 (six) hours as needed (abdominal pain).    multivitamin (THERAGRAN) per tablet Take 1 tablet by mouth Daily.    chlorhexidine (PERIDEX) 0.12 % solution     dicyclomine (BENTYL) 20 mg tablet TAKE 1 TABLET BY MOUTH 3 TIMES A DAY AS NEEDED FOR ABDOMINAL PAIN    hydrocortisone (ANUSOL-HC) 2.5 % rectal cream Place rectally 2 (two) times daily.    hydrocortisone (ANUSOL-HC) 25 mg suppository Place 1 suppository (25 mg total) rectally every 12 (twelve) hours as needed for Hemorrhoids.     No current facility-administered medications for this visit.         Review of Systems:  Review of Systems   Constitutional:  Negative for activity change, appetite change, chills, fatigue and fever.   HENT:  Negative for mouth sores.    Eyes:  Negative for visual disturbance.   Respiratory:  Negative for cough and shortness of breath.    Cardiovascular:  Negative for chest pain and leg swelling.   Gastrointestinal:  Positive for abdominal pain. Negative for constipation, diarrhea,  nausea, vomiting and reflux.   Endocrine: Negative for hyperthyroidism and hypothyroidism.   Genitourinary:  Negative for dysuria, flank pain, frequency, genital sores, hematuria, menstrual problem, pelvic pain, vaginal bleeding, vaginal discharge, vaginal pain, vaginal dryness and vaginal odor.   Musculoskeletal:  Negative for arthralgias, back pain and myalgias.   Integumentary:  Negative for rash, breast mass and breast tenderness.   Neurological:  Negative for headaches.   Hematological:  Negative for adenopathy. Does not bruise/bleed easily.   Psychiatric/Behavioral:  Negative for depression. The patient is not nervous/anxious.    Breast: Negative for asymmetry, mass and tenderness       OBJECTIVE:     Physical Exam:  Physical Exam  Constitutional:       General: She is not in acute distress.     Appearance: She is well-developed. She is not diaphoretic.   HENT:      Head: Normocephalic and atraumatic.      Nose: Nose normal.   Eyes:      Conjunctiva/sclera: Conjunctivae normal.      Pupils: Pupils are equal, round, and reactive to light.   Neck:      Thyroid: No thyromegaly.      Vascular: No JVD.      Trachea: No tracheal deviation.   Cardiovascular:      Rate and Rhythm: Normal rate and regular rhythm.      Heart sounds: Normal heart sounds. No murmur heard.     No friction rub. No gallop.   Pulmonary:      Effort: Pulmonary effort is normal. No respiratory distress.      Breath sounds: Normal breath sounds. No stridor. No wheezing or rales.   Chest:      Chest wall: No tenderness.   Abdominal:      General: Bowel sounds are normal. There is no distension.      Palpations: Abdomen is soft. There is no mass.      Tenderness: There is no abdominal tenderness. There is no guarding or rebound.      Hernia: No hernia is present.   Genitourinary:     General: Normal vulva.      Vagina: Normal.      Cervix: Normal.      Uterus: Normal.       Adnexa: Right adnexa normal and left adnexa normal.      Comments:  Atrophic vagina and vulva. Narrow introitus, dry mucosa.  Musculoskeletal:         General: No tenderness. Normal range of motion.      Cervical back: Normal range of motion and neck supple.   Lymphadenopathy:      Cervical: No cervical adenopathy.   Skin:     General: Skin is warm and dry.      Capillary Refill: Capillary refill takes less than 2 seconds.      Coloration: Skin is not pale.      Findings: No erythema or rash.   Neurological:      Mental Status: She is alert and oriented to person, place, and time.      Sensory: No sensory deficit.      Motor: No abnormal muscle tone.      Coordination: Coordination normal.      Deep Tendon Reflexes: Reflexes normal.   Psychiatric:         Behavior: Behavior normal.         Thought Content: Thought content normal.         Judgment: Judgment normal.           ASSESSMENT:       ICD-10-CM ICD-9-CM    1. Abdominal pain, lower  R10.30 789.09 US Pelvis Comp with Transvag NON-OB (xpd      CANCELED: US Pelvis Comp with Transvag NON-OB (xpd      2. Left lower quadrant pain  R10.32 789.04 Ambulatory referral/consult to Obstetrics / Gynecology      CANCELED: US Pelvis Comp with Transvag NON-OB (xpd      3. Postprandial abdominal bloating  R14.0 787.3 Ambulatory referral/consult to Obstetrics / Gynecology      4. Bloating  R14.0 787.3 US Pelvis Comp with Transvag NON-OB (xpd             Plan:      Pelvic examination is WNL. FU in 2-3 weeks for pelvic US.     FU in 2-3 weeks.    Counseling time: 20 minutes       BRONWYN Mcmillan

## 2024-03-26 ENCOUNTER — TELEPHONE (OUTPATIENT)
Dept: GASTROENTEROLOGY | Facility: CLINIC | Age: 74
End: 2024-03-26
Payer: MEDICARE

## 2024-03-26 NOTE — TELEPHONE ENCOUNTER
----- Message from Meliza Austin sent at 3/26/2024 10:25 AM CDT -----  Regarding: Results  Contact: [phone]  Pt calling regarding Endoscopy results from 2/29. Please call [phone]

## 2024-03-26 NOTE — PROGRESS NOTES
Daniela please tell patient that her EGD pathology was benign no celiac sprue no H pylori      1. DUODENUM, BIOPSY:  Duodenal mucosa with no significant pathologic abnormality  Villous architecture is maintained    2. STOMACH, BIOPSY:  Gastric body and antral mucosa with mild chronic gastritis and reactive changes  No evidence of intestinal metaplasia, dysplasia or malignancy  No evidence of Helicobacter pylori organisms on H&E stain    3. STOMACH, POLYPECTOMY:  Fundic gland polyps   Comment: Interp By Carlene Baker M.D., Signed on 03/05/2024

## 2024-04-02 ENCOUNTER — TELEPHONE (OUTPATIENT)
Dept: GASTROENTEROLOGY | Facility: CLINIC | Age: 74
End: 2024-04-02
Payer: MEDICARE

## 2024-04-02 NOTE — TELEPHONE ENCOUNTER
Daniela please tell patient her EGD pathology was benign no celiac sprue no H pylori.       1. DUODENUM, BIOPSY:   Duodenal mucosa with no significant pathologic abnormality   Villous architecture is maintained     2. STOMACH, BIOPSY:   Gastric body and antral mucosa with mild chronic gastritis and reactive changes   No evidence of intestinal metaplasia, dysplasia or malignancy   No evidence of Helicobacter pylori organisms on H&E stain     3. STOMACH, POLYPECTOMY:   Fundic gland polyps   Comment: Interp By Carlene Baker M.D., Signed on 03/05/2024     Spoke with patient and informed of results. Ms Almanza verbalized understanding.

## 2024-04-02 NOTE — TELEPHONE ENCOUNTER
----- Message from Amaya Huddleston sent at 4/2/2024 10:26 AM CDT -----  Regarding: results  Contact: pt@ [phone]  Pt is calling to speak to someone in the office to discuss test results. Pt is asking for a return call soon. Thanks.     Who Called: PT      Test Results for:Gastroesophageal reflux disease, unspecified whether esophagitis present [K21.9]     Best call back number:  pt@ [phone]     Additional Information: Pt states its 2nd call for results

## 2024-05-10 ENCOUNTER — OFFICE VISIT (OUTPATIENT)
Dept: PRIMARY CARE CLINIC | Facility: CLINIC | Age: 74
End: 2024-05-10
Payer: MEDICARE

## 2024-05-10 ENCOUNTER — HOSPITAL ENCOUNTER (OUTPATIENT)
Dept: RADIOLOGY | Facility: HOSPITAL | Age: 74
Discharge: HOME | End: 2024-05-10
Attending: STUDENT IN AN ORGANIZED HEALTH CARE EDUCATION/TRAINING PROGRAM
Payer: MEDICARE

## 2024-05-10 VITALS
WEIGHT: 96.56 LBS | OXYGEN SATURATION: 99 % | SYSTOLIC BLOOD PRESSURE: 110 MMHG | TEMPERATURE: 98 F | HEART RATE: 96 BPM | HEIGHT: 64 IN | BODY MASS INDEX: 16.49 KG/M2 | DIASTOLIC BLOOD PRESSURE: 60 MMHG

## 2024-05-10 DIAGNOSIS — M54.2 NECK PAIN: ICD-10-CM

## 2024-05-10 DIAGNOSIS — L65.9 OSTEOPENIA WITH SPARSE HAIR: ICD-10-CM

## 2024-05-10 DIAGNOSIS — M85.80 OSTEOPENIA WITH SPARSE HAIR: ICD-10-CM

## 2024-05-10 DIAGNOSIS — M25.512 ACUTE PAIN OF LEFT SHOULDER: ICD-10-CM

## 2024-05-10 DIAGNOSIS — Z78.0 ASYMPTOMATIC MENOPAUSAL STATE: ICD-10-CM

## 2024-05-10 DIAGNOSIS — M54.2 NECK PAIN: Primary | ICD-10-CM

## 2024-05-10 DIAGNOSIS — Z13.6 ENCOUNTER FOR SCREENING FOR CARDIOVASCULAR DISORDERS: ICD-10-CM

## 2024-05-10 DIAGNOSIS — M85.80 OSTEOPENIA, UNSPECIFIED LOCATION: ICD-10-CM

## 2024-05-10 PROCEDURE — 3288F FALL RISK ASSESSMENT DOCD: CPT | Mod: HCNC,CPTII,S$GLB, | Performed by: STUDENT IN AN ORGANIZED HEALTH CARE EDUCATION/TRAINING PROGRAM

## 2024-05-10 PROCEDURE — 73030 X-RAY EXAM OF SHOULDER: CPT | Mod: 26,HCNC,LT, | Performed by: INTERNAL MEDICINE

## 2024-05-10 PROCEDURE — 99999 PR PBB SHADOW E&M-EST. PATIENT-LVL IV: CPT | Mod: PBBFAC,HCNC,, | Performed by: STUDENT IN AN ORGANIZED HEALTH CARE EDUCATION/TRAINING PROGRAM

## 2024-05-10 PROCEDURE — 1159F MED LIST DOCD IN RCRD: CPT | Mod: HCNC,CPTII,S$GLB, | Performed by: STUDENT IN AN ORGANIZED HEALTH CARE EDUCATION/TRAINING PROGRAM

## 2024-05-10 PROCEDURE — 3074F SYST BP LT 130 MM HG: CPT | Mod: HCNC,CPTII,S$GLB, | Performed by: STUDENT IN AN ORGANIZED HEALTH CARE EDUCATION/TRAINING PROGRAM

## 2024-05-10 PROCEDURE — 99213 OFFICE O/P EST LOW 20 MIN: CPT | Mod: HCNC,S$GLB,, | Performed by: STUDENT IN AN ORGANIZED HEALTH CARE EDUCATION/TRAINING PROGRAM

## 2024-05-10 PROCEDURE — 73030 X-RAY EXAM OF SHOULDER: CPT | Mod: TC,HCNC,PN,LT

## 2024-05-10 PROCEDURE — 1101F PT FALLS ASSESS-DOCD LE1/YR: CPT | Mod: HCNC,CPTII,S$GLB, | Performed by: STUDENT IN AN ORGANIZED HEALTH CARE EDUCATION/TRAINING PROGRAM

## 2024-05-10 PROCEDURE — 72040 X-RAY EXAM NECK SPINE 2-3 VW: CPT | Mod: 26,HCNC,, | Performed by: INTERNAL MEDICINE

## 2024-05-10 PROCEDURE — 3078F DIAST BP <80 MM HG: CPT | Mod: HCNC,CPTII,S$GLB, | Performed by: STUDENT IN AN ORGANIZED HEALTH CARE EDUCATION/TRAINING PROGRAM

## 2024-05-10 PROCEDURE — 72040 X-RAY EXAM NECK SPINE 2-3 VW: CPT | Mod: TC,HCNC,PN

## 2024-05-10 PROCEDURE — 1125F AMNT PAIN NOTED PAIN PRSNT: CPT | Mod: HCNC,CPTII,S$GLB, | Performed by: STUDENT IN AN ORGANIZED HEALTH CARE EDUCATION/TRAINING PROGRAM

## 2024-05-10 PROCEDURE — 1160F RVW MEDS BY RX/DR IN RCRD: CPT | Mod: HCNC,CPTII,S$GLB, | Performed by: STUDENT IN AN ORGANIZED HEALTH CARE EDUCATION/TRAINING PROGRAM

## 2024-05-10 NOTE — PROGRESS NOTES
Office visit  Patient: Marshall Almanza   5/10/2024     Assessment:     1. Neck pain    2. Acute pain of left shoulder      Plan:       1. Neck pain  -     X-Ray Cervical Spine 2 or 3 Views; Future; Expected date: 05/10/2024  -     Comprehensive Metabolic Panel; Future; Expected date: 05/10/2024        -likely due to existing arthritis with musculoskeletal strain superimposed; based on physical exam, pain seems to be more muscular in origin    2. Acute pain of left shoulder  -     X-Ray Shoulder 2 or More Views Left; Future; Expected date: 05/10/2024        -continue NSAIDs and diclofenac gel; will hold off on PT for now, as it seems to be a muscle strain      Return to clinic in 2-4 weeks for annual physical or sooner as needed.          CHIEF COMPLAINT: neck pain    HPI: Marshall Almanza is a 74 y.o. female who presents for neck pain. She reports that she's had neck pain since 2021.  She had an xray of her cervical spine, which showed degenerative changes. She went to physical therapy for this and was given exercises. She's concerned because the pain has started running down her left arm. She's also having left shoulder pain.  The pain feels like a tightness down her arm.  Moving exacerbates the pain.  Staying still relieves the pain. She's been using Voltarin gel, which helps a little bit.  The left arm pain has been going on for about a week.  She doesn't recall any inciting event.  She takes Aleve when the pain is very bad, but she mostly uses the gel.  She reports occasional tingling on her shoulder. Denies numbness. She denies chest pain.  Sometimes, she also gets a headache.    She has trouble moving her arm due to pain.          Current Outpatient Medications   Medication Instructions    chlorhexidine (PERIDEX) 0.12 % solution No dose, route, or frequency recorded.    dicyclomine (BENTYL) 20 mg tablet TAKE 1 TABLET BY MOUTH 3 TIMES A DAY AS NEEDED FOR ABDOMINAL PAIN    esomeprazole (NEXIUM) 40 mg, Oral, Daily     "hydrocortisone (ANUSOL-HC) 2.5 % rectal cream Rectal, 2 times daily    hyoscyamine (ANASPAZ,LEVSIN) 125 mcg, Oral, Every 6 hours PRN    multivitamin (THERAGRAN) per tablet 1 tablet, Daily       Lab Results   Component Value Date    HGBA1C 5.6 11/17/2015    HGBA1C 5.7 07/12/2013    HGBA1C 5.7 07/16/2012     No results found for: "MICALBCREAT"  Lab Results   Component Value Date    LDLCALC 115.8 03/07/2023    LDLCALC 110.8 03/30/2022    CHOL 232 (H) 03/07/2023    HDL 95 (H) 03/07/2023    TRIG 106 03/07/2023       Lab Results   Component Value Date     10/31/2023    K 4.5 10/31/2023     10/31/2023    CO2 24 10/31/2023    GLU 97 10/31/2023    BUN 13 10/31/2023    CREATININE 0.8 10/31/2023    CALCIUM 9.8 11/10/2023    PROT 7.2 10/31/2023    ALBUMIN 4.2 10/31/2023    BILITOT 0.8 10/31/2023    ALKPHOS 61 10/31/2023    AST 31 10/31/2023    ALT 21 10/31/2023    ANIONGAP 14 10/31/2023    ESTGFRAFRICA >60.0 10/15/2021    EGFRNONAA >60.0 10/15/2021    WBC 4.47 10/31/2023    HGB 14.0 10/31/2023    HGB 14.1 03/07/2023    HCT 43.7 10/31/2023    MCV 94 10/31/2023     10/31/2023    TSH 1.736 10/31/2023    PSA <0.01 09/24/2014    HEPCAB Negative 11/18/2020       Lab Results   Component Value Date    OVEYHIOU57TE 54 10/31/2023    AQVOJHNP73PO 58 03/07/2023    UTIKFZJP14 1087 (H) 10/31/2023    FERRITIN 188 06/16/2016    IRON 103 06/16/2016    TRANSFERRIN 244 06/16/2016    TIBC 361 06/16/2016    FESATURATED 29 06/16/2016         Past Medical History:   Diagnosis Date    Arthritis     Multiple thyroid nodules     Osteoporosis     Reflux     Senile nuclear sclerosis - Both Eyes 10/1/2013     Past Surgical History:   Procedure Laterality Date    COLONOSCOPY N/A 9/14/2020    Procedure: COLONOSCOPY;  Surgeon: Herminio Franco MD;  Location: [facility] ([address] [place] [address]);  Service: Endoscopy;  Laterality: N/A;  Patient would like colonoscopy 1st case of the day with me  Please recommend MiraLax 17 g in 12 oz of water 3 days in 2 " "days prior  With a split bowel prep.        COVID test at [facility] on 9/11-GT    COLONOSCOPY N/A 2/29/2024    Procedure: COLONOSCOPY;  Surgeon: Herminio Franco MD;  Location: [facility] (4TH FLR);  Service: Endoscopy;  Laterality: N/A;    ESOPHAGOGASTRODUODENOSCOPY N/A 2/29/2024    Procedure: EGD (ESOPHAGOGASTRODUODENOSCOPY);  Surgeon: Herminio Franco MD;  [facility]  Service: Endoscopy;  Laterality: N/A;  Procedure: EGD/Colonoscopy   golytely  2/23-precall complete-MS  2/28-pt notified of earlier arrival time (0915am)-\[facility]\""     Vitals:    05/10/24 0856   BP: 110/60   Pulse: 96   Temp: 98.2 °F (36.8 °C)   TempSrc: Oral   SpO2: 99%   Weight: 43.8 kg (96 lb 9 oz)   Height: 5' 4" (1.626 m)   PainSc:   6   PainLoc: Neck     Objective:   Physical Exam  Vitals reviewed.   Constitutional:       General: She is not in acute distress.     Appearance: Normal appearance. She is not diaphoretic.   HENT:      Head: Normocephalic.      Right Ear: External ear normal.      Left Ear: External ear normal.   Eyes:      General: No scleral icterus.     Conjunctiva/sclera: Conjunctivae normal.   Cardiovascular:      Comments: Appears well-perfused  Pulmonary:      Effort: Pulmonary effort is normal. No respiratory distress.   Musculoskeletal:      Left shoulder: Tenderness present. No swelling, deformity or bony tenderness. Decreased range of motion.      Cervical back: Normal range of motion. Muscular tenderness present. No spinous process tenderness.   Skin:     Findings: No lesion or rash.   Neurological:      General: No focal deficit present.      Mental Status: She is alert and oriented to person, place, and time.   Psychiatric:         Mood and Affect: Mood normal.         Behavior: Behavior normal.         Thought Content: Thought content normal.             Kasia Garcia MD  Internal Medicine and Pediatrics                            "

## 2024-05-13 ENCOUNTER — TELEPHONE (OUTPATIENT)
Dept: PRIMARY CARE CLINIC | Facility: CLINIC | Age: 74
End: 2024-05-13
Payer: MEDICARE

## 2024-05-13 NOTE — TELEPHONE ENCOUNTER
----- Message from Kasia Garcia MD sent at 5/13/2024  2:26 PM CDT -----  Please call patient to let her know that she has some arthritis in her shoulder, but no broken bones.

## 2024-05-20 ENCOUNTER — HOSPITAL ENCOUNTER (OUTPATIENT)
Dept: RADIOLOGY | Facility: HOSPITAL | Age: 74
Discharge: HOME | End: 2024-05-20
Attending: STUDENT IN AN ORGANIZED HEALTH CARE EDUCATION/TRAINING PROGRAM
Payer: MEDICARE

## 2024-05-20 DIAGNOSIS — Z78.0 ASYMPTOMATIC MENOPAUSAL STATE: ICD-10-CM

## 2024-05-20 PROCEDURE — 77080 DXA BONE DENSITY AXIAL: CPT | Mod: 26,,, | Performed by: INTERNAL MEDICINE

## 2024-05-20 PROCEDURE — 77080 DXA BONE DENSITY AXIAL: CPT | Mod: TC

## 2024-05-21 ENCOUNTER — OFFICE VISIT (OUTPATIENT)
Dept: OPTOMETRY | Facility: CLINIC | Age: 74
End: 2024-05-21
Payer: MEDICARE

## 2024-05-21 ENCOUNTER — TELEPHONE (OUTPATIENT)
Dept: PRIMARY CARE CLINIC | Facility: CLINIC | Age: 74
End: 2024-05-21
Payer: MEDICARE

## 2024-05-21 ENCOUNTER — TELEPHONE (OUTPATIENT)
Dept: OPHTHALMOLOGY | Facility: CLINIC | Age: 74
End: 2024-05-21
Payer: MEDICARE

## 2024-05-21 DIAGNOSIS — H25.813 COMBINED FORMS OF AGE-RELATED CATARACT OF BOTH EYES: Primary | ICD-10-CM

## 2024-05-21 PROCEDURE — 1101F PT FALLS ASSESS-DOCD LE1/YR: CPT | Mod: CPTII,S$GLB,, | Performed by: OPTOMETRIST

## 2024-05-21 PROCEDURE — 99214 OFFICE O/P EST MOD 30 MIN: CPT | Mod: S$GLB,,, | Performed by: OPTOMETRIST

## 2024-05-21 PROCEDURE — 1126F AMNT PAIN NOTED NONE PRSNT: CPT | Mod: CPTII,S$GLB,, | Performed by: OPTOMETRIST

## 2024-05-21 PROCEDURE — 99999 PR PBB SHADOW E&M-EST. PATIENT-LVL II: CPT | Mod: PBBFAC,,, | Performed by: OPTOMETRIST

## 2024-05-21 PROCEDURE — 1159F MED LIST DOCD IN RCRD: CPT | Mod: CPTII,S$GLB,, | Performed by: OPTOMETRIST

## 2024-05-21 PROCEDURE — 3288F FALL RISK ASSESSMENT DOCD: CPT | Mod: CPTII,S$GLB,, | Performed by: OPTOMETRIST

## 2024-05-21 NOTE — PROGRESS NOTES
HPI    Pt is here today for routine eye exam. Patient denies pain/discomfort.   States that she occasionally feels that something is trapped in her eyes.  DLS: 2022  (-)Flashes   (-)Floaters   (-)Diplopia   (-)Headaches   (-)Itching   (-)Tearing  (-)Burning  (-)Dryness   (-)Photophobia  (-)Glare   (+)Blurred VA  Past Eye Sx: (-)  Eye Meds: Refresh OU PRN    Last edited by Pebbles Davis, OD on 5/21/2024 11:04 AM.            Assessment /Plan     For exam results, see Encounter Report.    Combined forms of age-related cataract of both eyes      Referral to Dr. Ford for cataract extraction evaluation.  Reviewed implant options and gave patient a copy of cataract FAQ.       RTC for cataract pre-op/post-op care

## 2024-05-21 NOTE — TELEPHONE ENCOUNTER
Cataract Eval has been schedule with Dr. Ford at the [place] location on 8/12/2024.     ----- Message from Pebbles Davis, FLO sent at 5/21/2024  2:24 PM CDT -----  Regarding: Referral  Pt would like to be set up for a cataract eval at [facility]. Pt's phone # is correct in chart. Thanks!

## 2024-05-21 NOTE — TELEPHONE ENCOUNTER
----- Message from Kasia Garcia MD sent at 5/21/2024  8:01 AM CDT -----  Please call and let patient know that her labs looked good overall. We can discuss them more in depth at her next visit.

## 2024-05-22 ENCOUNTER — TELEPHONE (OUTPATIENT)
Dept: PRIMARY CARE CLINIC | Facility: CLINIC | Age: 74
End: 2024-05-22
Payer: MEDICARE

## 2024-05-22 NOTE — TELEPHONE ENCOUNTER
----- Message from Kasia Garcia MD sent at 5/22/2024  8:07 AM CDT -----  Please call patient and let her know she has osteoporosis. We will discuss these results in detail at her upcoming appointment.

## 2024-06-06 ENCOUNTER — OFFICE VISIT (OUTPATIENT)
Dept: PRIMARY CARE CLINIC | Facility: CLINIC | Age: 74
End: 2024-06-06
Payer: MEDICARE

## 2024-06-06 VITALS
DIASTOLIC BLOOD PRESSURE: 76 MMHG | HEIGHT: 64 IN | HEART RATE: 81 BPM | WEIGHT: 96.56 LBS | OXYGEN SATURATION: 96 % | BODY MASS INDEX: 16.49 KG/M2 | SYSTOLIC BLOOD PRESSURE: 114 MMHG

## 2024-06-06 DIAGNOSIS — M81.0 AGE-RELATED OSTEOPOROSIS WITHOUT CURRENT PATHOLOGICAL FRACTURE: ICD-10-CM

## 2024-06-06 DIAGNOSIS — G89.29 CHRONIC LEFT SHOULDER PAIN: ICD-10-CM

## 2024-06-06 DIAGNOSIS — M25.512 CHRONIC LEFT SHOULDER PAIN: ICD-10-CM

## 2024-06-06 DIAGNOSIS — M81.0 SENILE OSTEOPOROSIS: ICD-10-CM

## 2024-06-06 DIAGNOSIS — Z00.00 ANNUAL PHYSICAL EXAM: Primary | ICD-10-CM

## 2024-06-06 PROCEDURE — 1159F MED LIST DOCD IN RCRD: CPT | Mod: HCNC,CPTII,S$GLB, | Performed by: STUDENT IN AN ORGANIZED HEALTH CARE EDUCATION/TRAINING PROGRAM

## 2024-06-06 PROCEDURE — 3288F FALL RISK ASSESSMENT DOCD: CPT | Mod: HCNC,CPTII,S$GLB, | Performed by: STUDENT IN AN ORGANIZED HEALTH CARE EDUCATION/TRAINING PROGRAM

## 2024-06-06 PROCEDURE — 1160F RVW MEDS BY RX/DR IN RCRD: CPT | Mod: HCNC,CPTII,S$GLB, | Performed by: STUDENT IN AN ORGANIZED HEALTH CARE EDUCATION/TRAINING PROGRAM

## 2024-06-06 PROCEDURE — 99999 PR PBB SHADOW E&M-EST. PATIENT-LVL IV: CPT | Mod: PBBFAC,HCNC,, | Performed by: STUDENT IN AN ORGANIZED HEALTH CARE EDUCATION/TRAINING PROGRAM

## 2024-06-06 PROCEDURE — 1101F PT FALLS ASSESS-DOCD LE1/YR: CPT | Mod: HCNC,CPTII,S$GLB, | Performed by: STUDENT IN AN ORGANIZED HEALTH CARE EDUCATION/TRAINING PROGRAM

## 2024-06-06 PROCEDURE — 3078F DIAST BP <80 MM HG: CPT | Mod: HCNC,CPTII,S$GLB, | Performed by: STUDENT IN AN ORGANIZED HEALTH CARE EDUCATION/TRAINING PROGRAM

## 2024-06-06 PROCEDURE — 99397 PER PM REEVAL EST PAT 65+ YR: CPT | Mod: HCNC,S$GLB,, | Performed by: STUDENT IN AN ORGANIZED HEALTH CARE EDUCATION/TRAINING PROGRAM

## 2024-06-06 PROCEDURE — 1126F AMNT PAIN NOTED NONE PRSNT: CPT | Mod: HCNC,CPTII,S$GLB, | Performed by: STUDENT IN AN ORGANIZED HEALTH CARE EDUCATION/TRAINING PROGRAM

## 2024-06-06 PROCEDURE — 3074F SYST BP LT 130 MM HG: CPT | Mod: HCNC,CPTII,S$GLB, | Performed by: STUDENT IN AN ORGANIZED HEALTH CARE EDUCATION/TRAINING PROGRAM

## 2024-06-06 NOTE — PROGRESS NOTES
Office visit  Patient: Mrashall Almanza   6/6/2024     Assessment:     1. Annual physical exam    2. Chronic left shoulder pain    3. Age-related osteoporosis without current pathological fracture      Plan:       1. Annual physical exam         -labs and DXA reviewed with patient         -Discussed healthy habits and recommended preventative screening    2. Chronic left shoulder pain  -     Ambulatory referral/consult to Orthopedics; Future; Expected date: 06/13/2024    3. Age-related osteoporosis without current pathological fracture  -     Ambulatory referral/consult to Endocrinology; Future; Expected date: 06/13/2024        -Was on Fosomax for 8 years ([id number]) and stopped in 2021. T-score is worsening on DXA -2.8 and -2.9 for femoral neck and hip. -1.5 for lumbar spine.      Return to clinic in 1 year or sooner as needed.          CHIEF COMPLAINT: annual physical and discuss lab results    HPI: Marshall Almanza is a 74 y.o. female who presents for an annual physical. She had labs done prior to this appointment.  She complains of left shoulder pain.    Tooth extraction in 2022.    The 10-year ASCVD risk score (Shelbie DK, et al., 2019) is: 15%    Values used to calculate the score:      Age: 74 years      Sex: Female      Is Non- : No      Diabetic: No      Tobacco smoker: No      Systolic Blood Pressure: 114 mmHg      Is BP treated: No      HDL Cholesterol: 93 mg/dL      Total Cholesterol: 238 mg/dL          Current Outpatient Medications   Medication Instructions    chlorhexidine (PERIDEX) 0.12 % solution No dose, route, or frequency recorded.    dicyclomine (BENTYL) 20 mg tablet TAKE 1 TABLET BY MOUTH 3 TIMES A DAY AS NEEDED FOR ABDOMINAL PAIN    esomeprazole (NEXIUM) 40 mg, Oral, Daily    hydrocortisone (ANUSOL-HC) 2.5 % rectal cream Rectal, 2 times daily    hyoscyamine (ANASPAZ,LEVSIN) 125 mcg, Oral, Every 6 hours PRN    multivitamin (THERAGRAN) per tablet 1 tablet, Daily       Lab  "Results   Component Value Date    HGBA1C 5.6 11/17/2015    HGBA1C 5.7 07/12/2013    HGBA1C 5.7 07/16/2012     No results found for: "MICALBCREAT"  Lab Results   Component Value Date    LDLCALC 130.4 05/20/2024    LDLCALC 115.8 03/07/2023    CHOL 238 (H) 05/20/2024    HDL 93 (H) 05/20/2024    TRIG 73 05/20/2024       Lab Results   Component Value Date     05/20/2024    K 4.6 05/20/2024     05/20/2024    CO2 27 05/20/2024    GLU 96 05/20/2024    BUN 19 05/20/2024    CREATININE 0.8 05/20/2024    CALCIUM 9.6 05/20/2024    PROT 6.9 05/20/2024    ALBUMIN 3.9 05/20/2024    BILITOT 0.7 05/20/2024    ALKPHOS 59 05/20/2024    AST 25 05/20/2024    ALT 19 05/20/2024    ANIONGAP 9 05/20/2024    ESTGFRAFRICA >60.0 10/15/2021    EGFRNONAA >60.0 10/15/2021    WBC 4.12 05/20/2024    HGB 13.9 05/20/2024    HGB 14.0 10/31/2023    HCT 42.6 05/20/2024    MCV 95 05/20/2024     05/20/2024    TSH 1.676 05/20/2024    PSA <0.01 09/24/2014    HEPCAB Negative 11/18/2020       Lab Results   Component Value Date    ELKWRKAL37KU 54 10/31/2023    MJNRKDRM73VH 58 03/07/2023    CKMHGMJY46 1087 (H) 10/31/2023    FERRITIN 188 06/16/2016    IRON 103 06/16/2016    TRANSFERRIN 244 06/16/2016    TIBC 361 06/16/2016    FESATURATED 29 06/16/2016         Past Medical History:   Diagnosis Date    Arthritis     Multiple thyroid nodules     Osteoporosis     Reflux     Senile nuclear sclerosis - Both Eyes 10/1/2013     Past Surgical History:   Procedure Laterality Date    COLONOSCOPY N/A 9/14/2020    Procedure: COLONOSCOPY;  Surgeon: Herminio Franco MD;  Location: [facility] (4TH FLR);  Service: Endoscopy;  Laterality: N/A;  Patient would like colonoscopy 1st case of the day with me  Please recommend MiraLax 17 g in 12 oz of water 3 days in 2 days prior  With a split bowel prep.        [facility] at [facility] on 9/11-GT    COLONOSCOPY N/A 2/29/2024    Procedure: COLONOSCOPY;  Surgeon: Herminio Franco MD;  [facility] (4TH " "FLR);  Service: Endoscopy;  Laterality: N/A;    ESOPHAGOGASTRODUODENOSCOPY N/A 2/29/2024    Procedure: EGD (ESOPHAGOGASTRODUODENOSCOPY);  Surgeon: Herminio Franco MD;  Location: [facility] (4TH FLR);  Service: Endoscopy;  Laterality: N/A;  Procedure: EGD/Colonoscopy   golytely  2/23-precall complete-MS  2/28-pt notified of earlier arrival time (0915am)-[facility] [place]     Vitals:    06/06/24 1055   BP: 114/76   Pulse: 81   SpO2: 96%   Weight: 43.8 kg (96 lb 9 oz)   Height: 5' 4" (1.626 m)   PainSc: 0-No pain     Objective:   Physical Exam  Vitals reviewed.   Constitutional:       General: She is not in acute distress.     Appearance: She is well-developed.      Comments: Thin, frail-appearing woman   HENT:      Head: Normocephalic and atraumatic.      Right Ear: External ear normal.      Left Ear: External ear normal.      Nose: Nose normal.      Mouth/Throat:      Mouth: Mucous membranes are moist.   Eyes:      General: No scleral icterus.        Right eye: No discharge.         Left eye: No discharge.      Conjunctiva/sclera: Conjunctivae normal.   Cardiovascular:      Rate and Rhythm: Normal rate and regular rhythm.      Heart sounds: Normal heart sounds. No murmur heard.     No friction rub. No gallop.   Pulmonary:      Effort: Pulmonary effort is normal. No respiratory distress.      Breath sounds: Normal breath sounds. No wheezing, rhonchi or rales.   Musculoskeletal:         General: No deformity.      Right lower leg: No edema.      Left lower leg: No edema.   Skin:     General: Skin is warm and dry.   Neurological:      General: No focal deficit present.      Mental Status: She is alert and oriented to person, place, and time.   Psychiatric:         Mood and Affect: Mood normal.         Behavior: Behavior normal.         Thought Content: Thought content normal.         Kasia Garcia MD  Internal Medicine and Pediatrics                            "

## 2024-06-07 ENCOUNTER — E-CONSULT (OUTPATIENT)
Dept: ENDOCRINOLOGY | Facility: CLINIC | Age: 74
End: 2024-06-07
Payer: MEDICARE

## 2024-06-07 DIAGNOSIS — M81.0 OSTEOPOROSIS, UNSPECIFIED OSTEOPOROSIS TYPE, UNSPECIFIED PATHOLOGICAL FRACTURE PRESENCE: Primary | ICD-10-CM

## 2024-06-07 PROCEDURE — 99451 NTRPROF PH1/NTRNET/EHR 5/>: CPT | Mod: S$GLB,,, | Performed by: INTERNAL MEDICINE

## 2024-06-07 NOTE — CONSULTS
[facility]  Response for E-Consult     Patient Name: Marshall Almanza  MRN: [id number]  Primary Care Provider: Kasia Garcia MD   Requesting Provider: Kasia Garcia MD  E-Consult to Endocrinology  Consult performed by: Jesi Cullen MD  Consult ordered by: Kasia Garcia MD          Recommendation:   1) FRAX score is not high risk (>20% and > 4.5%)   2) T scores not <-3.0  3) no fractures    Based on T scores can restart bisphosphonates (oral or IV) or SQ therapy with prolia every six months.     Total time of Consultation: 5 minute    I did not speak to the requesting provider verbally about this.     *This eConsult is based on the clinical data available to me and is furnished without benefit of a physical examination. The eConsult will need to be interpreted in light of any clinical issues or changes in patient status not available to me at the time of filing this eConsults. Significant changes in patient condition or level of acuity should result in immediate formal consultation and reevaluation. Please alert me if you have further questions.    Thank you for this eConsult referral.     Jesi L Dipp, MD  [facility]

## 2024-07-08 ENCOUNTER — OFFICE VISIT (OUTPATIENT)
Dept: SPORTS MEDICINE | Facility: CLINIC | Age: 74
End: 2024-07-08
Payer: MEDICARE

## 2024-07-08 VITALS
BODY MASS INDEX: 16.44 KG/M2 | SYSTOLIC BLOOD PRESSURE: 101 MMHG | WEIGHT: 95.81 LBS | HEART RATE: 89 BPM | DIASTOLIC BLOOD PRESSURE: 60 MMHG

## 2024-07-08 DIAGNOSIS — M75.42 SUBACROMIAL IMPINGEMENT, LEFT: ICD-10-CM

## 2024-07-08 DIAGNOSIS — M25.512 CHRONIC LEFT SHOULDER PAIN: Primary | ICD-10-CM

## 2024-07-08 DIAGNOSIS — M50.30 DDD (DEGENERATIVE DISC DISEASE), CERVICAL: ICD-10-CM

## 2024-07-08 DIAGNOSIS — G89.29 CHRONIC LEFT SHOULDER PAIN: Primary | ICD-10-CM

## 2024-07-08 PROCEDURE — 99999 PR PBB SHADOW E&M-EST. PATIENT-LVL III: CPT | Mod: PBBFAC,HCNC,, | Performed by: STUDENT IN AN ORGANIZED HEALTH CARE EDUCATION/TRAINING PROGRAM

## 2024-07-08 NOTE — PROGRESS NOTES
CC: left shoulder pain    74 y.o. Female presents today for evaluation of her left shoulder pain. Pt reports gradual onset of lateral neck pain about 1 yr ago and states pain started to refer down to lateral upper arm a few weeks ago. Pt localizes pain to left-sided lateral neck, superior shoulder and lateral/posterior upper arm. Pt reports pain is 6-7/10 today. Pt notes difficulty with reaching behind her back (internal rotation). Pt denies mechanical symptoms. Pt denies numbness/tingling.     Pt also notes intermittent right shoulder pain (superior shoulder) that is not as painful as L side.    Hand dominance: Right     SYMPTOMS:   Pain Score: 6-7/10  Pain location: left-sided lateral neck, superior shoulder and lateral/posterior upper arm  Time of onset: ~1 yr (neck), a few weeks (arm)  Trauma, injury: gradual onset    Nocturnal pain: no  Weakness: yes  Clicking, catching: none  Neck problems: left-sided neck pain    INTERVENTIONS:   Medications tried: pain relief cream, voltaren gel, salonpas patches (has some relief with all)  Physical therapy: fPT Dec '23/Jan '24 (no relief)  Injections: none    RELEVANT HISTORY:   Imaging to date: 5/10/24  Previous significant shoulder/arm injuries: none  Previous shoulder surgeries: none    Occupation: Retired     REVIEW OF SYSTEMS:   Constitution: Patient denies fever or chills.  Eyes: Patient denies eye pain or vision changes.  HEENT: Patient denies ear pain, sore throat, or nasal discharge.  CVS: Patient denies chest pain.  Lungs: Patient denies shortness of breath or cough.  Abdomen: Patient denies any stomach pain, nausea, vomiting, or diarrhea  Skin: Patient denies skin rash or itching.    Musculoskeletal: Patient denies recent injuries or trauma.  Neuro: Patient denies any numbness or tingling in upper or lower extremities.  Psych: Patient denies any current anxiety or nervousness.    PAST MEDICAL HISTORY:   Past Medical History:   Diagnosis Date    Arthritis      Multiple thyroid nodules     Osteoporosis     Reflux     Senile nuclear sclerosis - Both Eyes 10/1/2013       PAST SURGICAL HISTORY:  Past Surgical History:   Procedure Laterality Date    COLONOSCOPY N/A 9/14/2020    Procedure: COLONOSCOPY;  Surgeon: Herminio Franco MD;  Location: [facility] (4TH FLR);  Service: Endoscopy;  Laterality: N/A;  Patient would like colonoscopy 1st case of the day with me  Please recommend MiraLax 17 g in 12 oz of water 3 days in 2 days prior  With a split bowel prep.        COVID test at [facility] on 9/11-GT    COLONOSCOPY N/A 2/29/2024    Procedure: COLONOSCOPY;  Surgeon: Herminio Franco MD;  Location: [facility] (4TH FLR);  Service: Endoscopy;  Laterality: N/A;    ESOPHAGOGASTRODUODENOSCOPY N/A 2/29/2024    Procedure: EGD (ESOPHAGOGASTRODUODENOSCOPY);  Surgeon: Herminio Franco MD;  [facility]  Service: Endoscopy;  Laterality: N/A;  Procedure: EGD/Colonoscopy   golytely  2/23-precall complete-MS  2/28-pt notified of earlier arrival time (0915am)-[facility]       FAMILY HISTORY:  Family History   Problem Relation Name Age of Onset    Cataracts Mother      Cancer Father          leukemia    Amblyopia Neg Hx      Blindness Neg Hx      Diabetes Neg Hx      Glaucoma Neg Hx      Hypertension Neg Hx      Macular degeneration Neg Hx      Retinal detachment Neg Hx      Strabismus Neg Hx      Stroke Neg Hx      Thyroid disease Neg Hx      Breast cancer Neg Hx      Colon cancer Neg Hx      Ovarian cancer Neg Hx      Celiac disease Neg Hx      Cirrhosis Neg Hx      Colon polyps Neg Hx      Crohn's disease Neg Hx      Esophageal cancer Neg Hx      Inflammatory bowel disease Neg Hx      Liver cancer Neg Hx      Rectal cancer Neg Hx      Liver disease Neg Hx      Stomach cancer Neg Hx      Ulcerative colitis Neg Hx      Pancreatic cancer Neg Hx         SOCIAL HISTORY:  Social History     Socioeconomic History    Marital status: Single    Number of children: 0   Occupational  History    Occupation:      Employer: [employer]   Tobacco Use    Smoking status: Never    Smokeless tobacco: Never   Substance and Sexual Activity    Alcohol use: No    Drug use: No    Sexual activity: Not Currently       MEDICATIONS:     Current Outpatient Medications:     dicyclomine (BENTYL) 20 mg tablet, TAKE 1 TABLET BY MOUTH 3 TIMES A DAY AS NEEDED FOR ABDOMINAL PAIN, Disp: 120 tablet, Rfl: 1    esomeprazole (NEXIUM) 40 MG capsule, Take 1 capsule (40 mg total) by mouth once daily., Disp: 90 capsule, Rfl: 3    hydrocortisone (ANUSOL-HC) 2.5 % rectal cream, Place rectally 2 (two) times daily., Disp: 30 g, Rfl: 1    hyoscyamine (ANASPAZ,LEVSIN) 0.125 mg Tab, Take 1 tablet (125 mcg total) by mouth every 6 (six) hours as needed (abdominal pain)., Disp: 90 tablet, Rfl: 1    multivitamin (THERAGRAN) per tablet, Take 1 tablet by mouth Daily., Disp: , Rfl:     chlorhexidine (PERIDEX) 0.12 % solution, , Disp: , Rfl:     ALLERGIES:   Review of patient's allergies indicates:   Allergen Reactions    Ciprofloxacin      palpitations    Flagyl [metronidazole hcl]      Cause urinary frequency        PHYSICAL EXAMINATION:  /60   Pulse 89   Wt 43.5 kg (95 lb 12.6 oz)   LMP 08/29/1998   BMI 16.44 kg/m²   Vitals signs and nursing note have been reviewed.    General: In no acute distress, well developed, well nourished, no diaphoresis  Eyes: EOM full and smooth, no eye redness or discharge  HEENT: normocephalic and atraumatic, neck supple, trachea midline, no nasal discharge  Cardiovascular: no LE edema  Lungs: respirations non-labored, no conversational dyspnea   Neuro: AAOx3, CN2-12 grossly intact  Skin: No rashes, warm and dry  Psychiatric: cooperative, pleasant, mood and affect appropriate for age    Left Shoulder:  INSPECTION / PALPATION:  -Deformity   -Ecchymosis   -Atrophy   -Sulcus sign   -No TTP over anatomy including clavicle, scapular spine, ACJ, acromion, supraspinatus,  infraspinatus, bicipital groove     ROM (* = with pain):    Flex to 120° vs 170° contra   Abduct to 80° vs 150° contra   Int rot to T7 vs T7 contra   Ext rot to 60° vs 60° contra  -Scapular dyskinesis     STRENGTH:    Scaption 5/5   Flexion 5/5   Ext rot 5/5   Int rot 5/5   Sup/Pro 5/5    OTHER:   +Painful arc   -Drop arm   -Int lag  -Ext lag  +Neer's   +Valdes-Fer   +[place] active compression   -Empty Can  +Full Can  -Speed's   -Yergason's  -Apprehension    NECK:   Grossly FROM & painless in neck flex, ext, B/L torsion, B/L lat flexion   -Spurling B/L    Hands NVI B/L      IMAGIN. Shoulder X-ray ordered due to left shoulder pain. 3 views taken 5/10/24.   2. X-ray images were reviewed personally by me and then directly with patient.  3. FINDINGS: There is osseous demineralization. There is no dislocation. No evidence of fracture or osseous destruction is noted. There is mild degenerative change of the acromioclavicular joint.     4. IMPRESSION:  As above     IMAGIN. Cervical neck x-rays taken on 05/10/2024 reviewed at today's visit 5   2. X-ray images were reviewed personally by me and then directly with patient.  3. FINDINGS: There is straightening of the normal cervical lordosis. There is disc space narrowing from C4-5 through C6-7 with mild osteophyte formation. Degenerative changes are fairly similar to the prior exam. The odontoid is intact. There is vascular calcification within the soft tissues.     4. IMPRESSION:  No acute osseous abnormalities appreciated.      ASSESSMENT:      ICD-10-CM ICD-9-CM   1. Chronic left shoulder pain  M25.512 719.41    G89.29 338.29   2. Subacromial impingement, left  M75.42 726.19   3. DDD (degenerative disc disease), cervical  M50.30 722.4         PLAN:  Based on patient history, physical exam findings, and imaging my differential diagnosis includes subacromial impingement syndrome, possible rotator cuff pathology, and cervical radiculopathy.  We will start  with a home exercise program at today's visit.  Follow-up in 6 weeks.  Pending follow up, may refer to back and spine specialist or consider subacromial bursa injection or MRI.    HOME EXERCISE PROGRAM (HEP): The patient was taught a homegoing physical therapy regimen for subacromial impingement syndrome. The patient demonstrated understanding of the exercises and proper technique of their execution. This interaction took 15 minutes and included demonstration of exercise as well as counseling to encourage patient to do exercises daily.        Future planning includes - consider subacromial injection, left shoulder MRI, or Back and Spine referral.    All questions were answered to the best of my ability and all concerns were addressed at this time.    Follow up in 6 weeks in person for above, or sooner if needed.      This note is dictated using the M*Modal Fluency Direct word recognition program. There are word recognition mistakes that are occasionally missed on review.

## 2024-08-12 ENCOUNTER — OFFICE VISIT (OUTPATIENT)
Dept: OPHTHALMOLOGY | Facility: CLINIC | Age: 74
End: 2024-08-12
Payer: MEDICARE

## 2024-08-12 DIAGNOSIS — H25.13 NUCLEAR SCLEROSIS OF BOTH EYES: Primary | ICD-10-CM

## 2024-08-12 DIAGNOSIS — H52.7 REFRACTIVE ERROR: ICD-10-CM

## 2024-08-12 DIAGNOSIS — H26.9 CORTICAL CATARACT OF BOTH EYES: ICD-10-CM

## 2024-08-12 DIAGNOSIS — H43.811 VITREOUS DETACHMENT OF RIGHT EYE: ICD-10-CM

## 2024-08-12 PROCEDURE — 3288F FALL RISK ASSESSMENT DOCD: CPT | Mod: HCNC,CPTII,S$GLB, | Performed by: OPHTHALMOLOGY

## 2024-08-12 PROCEDURE — 1126F AMNT PAIN NOTED NONE PRSNT: CPT | Mod: HCNC,CPTII,S$GLB, | Performed by: OPHTHALMOLOGY

## 2024-08-12 PROCEDURE — 92004 COMPRE OPH EXAM NEW PT 1/>: CPT | Mod: HCNC,S$GLB,, | Performed by: OPHTHALMOLOGY

## 2024-08-12 PROCEDURE — 1101F PT FALLS ASSESS-DOCD LE1/YR: CPT | Mod: HCNC,CPTII,S$GLB, | Performed by: OPHTHALMOLOGY

## 2024-08-12 PROCEDURE — 99999 PR PBB SHADOW E&M-EST. PATIENT-LVL II: CPT | Mod: PBBFAC,HCNC,, | Performed by: OPHTHALMOLOGY

## 2024-08-12 PROCEDURE — 1159F MED LIST DOCD IN RCRD: CPT | Mod: HCNC,CPTII,S$GLB, | Performed by: OPHTHALMOLOGY

## 2024-08-12 PROCEDURE — 92136 OPHTHALMIC BIOMETRY: CPT | Mod: HCNC,RT,S$GLB, | Performed by: OPHTHALMOLOGY

## 2024-08-12 PROCEDURE — 1160F RVW MEDS BY RX/DR IN RCRD: CPT | Mod: HCNC,CPTII,S$GLB, | Performed by: OPHTHALMOLOGY

## 2024-08-12 NOTE — PROGRESS NOTES
Subjective:       Patient ID: Marshall Almanza is a 74 y.o. female.    Chief Complaint: Cataract    HPI    Here for cataract evaluation     DLS: 5/21/24 [place]    Eye meds:None    74 year old female states vision is blurry OU and seems to be getting   worse and sees a lot of glare at night. Pt sees floaters OU. Denies   flashes and diplopia. Pt c/o occasionally feeling a foreign body sensation   in the left eye.   Last edited by Cordelia Enrique on 8/12/2024  8:49 AM.             Assessment:       1. Nuclear sclerosis of both eyes    2. Cortical cataract of both eyes    3. Vitreous detachment of right eye    4. Refractive error        Plan:       Visually significant cataract OU -Pt. Wants Sx.    PVD OD-Stable.  RE      Cataract Surgery Consent: Patient with a visually significant cataract with difficulties of ADLs, reading, driving, night vision, glare (any and all).  Discussed with Patient/Family/Caregiver: options, risks and benefits, expectations of cataract surgery, utilized an eye model with questions and answers to facilitate discussion.  Discussed lens options and patient understands that glasses may be required for optimal vision for distance and/or near vision after cataract surgery.  The Patient/Family/Caregiver  voice good understanding and patient wishes to proceed with surgery.  The patient will likely benefit from surgery and patient signed consent for Right Eye.  CE OD 1st CNAOTO 23.5,        OS 2nd CNAOTO 23.5.

## 2024-08-14 ENCOUNTER — TELEPHONE (OUTPATIENT)
Dept: OPHTHALMOLOGY | Facility: CLINIC | Age: 74
End: 2024-08-14
Payer: MEDICARE

## 2024-08-14 DIAGNOSIS — H25.11 NS (NUCLEAR SCLEROSIS), RIGHT: Primary | ICD-10-CM

## 2024-08-26 ENCOUNTER — OFFICE VISIT (OUTPATIENT)
Dept: SPORTS MEDICINE | Facility: CLINIC | Age: 74
End: 2024-08-26
Payer: MEDICARE

## 2024-08-26 VITALS
DIASTOLIC BLOOD PRESSURE: 67 MMHG | BODY MASS INDEX: 16.39 KG/M2 | SYSTOLIC BLOOD PRESSURE: 116 MMHG | HEIGHT: 64 IN | WEIGHT: 96 LBS

## 2024-08-26 DIAGNOSIS — G89.29 CHRONIC LEFT SHOULDER PAIN: Primary | ICD-10-CM

## 2024-08-26 DIAGNOSIS — M25.512 CHRONIC LEFT SHOULDER PAIN: Primary | ICD-10-CM

## 2024-08-26 DIAGNOSIS — M75.42 SUBACROMIAL IMPINGEMENT, LEFT: ICD-10-CM

## 2024-08-26 DIAGNOSIS — M50.30 DDD (DEGENERATIVE DISC DISEASE), CERVICAL: ICD-10-CM

## 2024-08-26 PROCEDURE — 3078F DIAST BP <80 MM HG: CPT | Mod: HCNC,CPTII,S$GLB, | Performed by: STUDENT IN AN ORGANIZED HEALTH CARE EDUCATION/TRAINING PROGRAM

## 2024-08-26 PROCEDURE — 99213 OFFICE O/P EST LOW 20 MIN: CPT | Mod: HCNC,S$GLB,, | Performed by: STUDENT IN AN ORGANIZED HEALTH CARE EDUCATION/TRAINING PROGRAM

## 2024-08-26 PROCEDURE — 99999 PR PBB SHADOW E&M-EST. PATIENT-LVL III: CPT | Mod: PBBFAC,HCNC,, | Performed by: STUDENT IN AN ORGANIZED HEALTH CARE EDUCATION/TRAINING PROGRAM

## 2024-08-26 PROCEDURE — 1125F AMNT PAIN NOTED PAIN PRSNT: CPT | Mod: HCNC,CPTII,S$GLB, | Performed by: STUDENT IN AN ORGANIZED HEALTH CARE EDUCATION/TRAINING PROGRAM

## 2024-08-26 PROCEDURE — 1159F MED LIST DOCD IN RCRD: CPT | Mod: HCNC,CPTII,S$GLB, | Performed by: STUDENT IN AN ORGANIZED HEALTH CARE EDUCATION/TRAINING PROGRAM

## 2024-08-26 PROCEDURE — 1160F RVW MEDS BY RX/DR IN RCRD: CPT | Mod: HCNC,CPTII,S$GLB, | Performed by: STUDENT IN AN ORGANIZED HEALTH CARE EDUCATION/TRAINING PROGRAM

## 2024-08-26 PROCEDURE — 3074F SYST BP LT 130 MM HG: CPT | Mod: HCNC,CPTII,S$GLB, | Performed by: STUDENT IN AN ORGANIZED HEALTH CARE EDUCATION/TRAINING PROGRAM

## 2024-08-26 NOTE — PROGRESS NOTES
CC: left shoulder pain    74 y.o. Female presents today for follow up evaluation of her left shoulder pain. Pt reports feeling improved, but not resolved. Pt reports she can now reach behind her back easier, but notes continued pulling sensation with this motion. Pt reports pain is 6-7/10 today, and localizes pain to superior shoulder and posterior upper arm. Pt denies mechanical symptoms. Pt denies numbness/tingling.     Attempted treatments: HEP (daily), voltaren gel  Pain score:  6-7/10  History of trauma/injury: none since last visit  Affecting ADLs: overhead motion, reaching behind back     REVIEW OF SYSTEMS:   Constitution: Patient denies fever or chills.  Eyes: Patient denies eye pain or vision changes.  HEENT: Patient denies ear pain, sore throat, or nasal discharge.  CVS: Patient denies chest pain.  Lungs: Patient denies shortness of breath or cough.  Skin: Patient denies skin rash or itching.    Musculoskeletal: Patient denies recent falls. See HPI.  Psych: Patient denies any current anxiety or nervousness.    PAST MEDICAL HISTORY:   Past Medical History:   Diagnosis Date    Arthritis     Multiple thyroid nodules     Osteoporosis     Reflux     Senile nuclear sclerosis - Both Eyes 10/1/2013       MEDICATIONS:     Current Outpatient Medications:     dicyclomine (BENTYL) 20 mg tablet, TAKE 1 TABLET BY MOUTH 3 TIMES A DAY AS NEEDED FOR ABDOMINAL PAIN, Disp: 120 tablet, Rfl: 1    esomeprazole (NEXIUM) 40 MG capsule, Take 1 capsule (40 mg total) by mouth once daily., Disp: 90 capsule, Rfl: 3    hydrocortisone (ANUSOL-HC) 2.5 % rectal cream, Place rectally 2 (two) times daily., Disp: 30 g, Rfl: 1    multivitamin (THERAGRAN) per tablet, Take 1 tablet by mouth Daily., Disp: , Rfl:     chlorhexidine (PERIDEX) 0.12 % solution, , Disp: , Rfl:     hyoscyamine (ANASPAZ,LEVSIN) 0.125 mg Tab, Take 1 tablet (125 mcg total) by mouth every 6 (six) hours as needed (abdominal pain). (Patient not taking: Reported on  "8/26/2024), Disp: 90 tablet, Rfl: 1    ALLERGIES:   Review of patient's allergies indicates:   Allergen Reactions    Ciprofloxacin      palpitations    Flagyl [metronidazole hcl]      Cause urinary frequency        PHYSICAL EXAMINATION:  /67   Ht 5' 4" (1.626 m)   Wt 43.6 kg (96 lb 0.2 oz)   LMP 08/29/1998   BMI 16.48 kg/m²   Vitals signs and nursing note have been reviewed.    General: In no acute distress, well developed, well nourished, no diaphoresis  Eyes: EOM full and smooth, no eye redness or discharge  HENT: normocephalic and atraumatic, neck supple, trachea midline, no nasal discharge  Cardiovascular: no LE edema  Lungs: respirations non-labored, no conversational dyspnea   Neuro: AAOx3, CN2-12 grossly intact  Skin: No rashes, warm and dry  Psychiatric: cooperative, pleasant, mood and affect appropriate for age    ASSESSMENT:      ICD-10-CM ICD-9-CM   1. Chronic left shoulder pain  M25.512 719.41    G89.29 338.29   2. Subacromial impingement, left  M75.42 726.19   3. DDD (degenerative disc disease), cervical  M50.30 722.4         PLAN:  We discussed treatment options such as retry the oral NSAIDs or move forward with a subacromial bursa CSI.  Patient would like to continue exercise program and will contact our clinic if she would like to try the steroid injection.    All questions were answered to the best of my ability and all concerns were addressed at this time.    Follow up for above, or sooner if needed.      This note is dictated using the M*Modal Fluency Direct word recognition program. There are word recognition mistakes that are occasionally missed on review.        "

## 2024-10-21 DIAGNOSIS — H25.13 NUCLEAR SCLEROSIS OF BOTH EYES: Primary | ICD-10-CM

## 2024-10-21 RX ORDER — PREDNISOLONE/MOXIFLOX/BROMFEN 1 %-0.5 %
1 SUSPENSION, DROPS(FINAL DOSAGE FORM)(ML) OPHTHALMIC (EYE) 3 TIMES DAILY
Qty: 8 ML | Refills: 2 | Status: SHIPPED | OUTPATIENT
Start: 2024-11-16

## 2024-10-23 ENCOUNTER — TELEPHONE (OUTPATIENT)
Dept: OPHTHALMOLOGY | Facility: CLINIC | Age: 74
End: 2024-10-23
Payer: MEDICARE

## 2024-10-23 NOTE — TELEPHONE ENCOUNTER
----- Message from Cortney sent at 10/23/2024 10:27 AM CDT -----  Regarding: FW: Appt  Contact: Pt  [phone]    ----- Message -----  From: Lata Garcia  Sent: 10/23/2024  10:24 AM CDT  To: [facility] Staff  Subject: Appt                                                       Name of Caller:  Cami      Contact Preference:   [phone]    Nature of Call:  Requesting a call to get arrival  time for procedure scheduled for  11/19/24

## 2024-11-10 RX ORDER — CYCLOP/TROP/PROPA/PHEN/KET/WAT 1-1-0.1%
1 DROPS (EA) OPHTHALMIC (EYE)
OUTPATIENT
Start: 2024-11-19

## 2024-11-10 RX ORDER — MIDAZOLAM HYDROCHLORIDE 1 MG/ML
1 INJECTION, SOLUTION INTRAMUSCULAR; INTRAVENOUS
OUTPATIENT
Start: 2024-11-19

## 2024-11-10 RX ORDER — SODIUM CHLORIDE 9 MG/ML
INJECTION, SOLUTION INTRAVENOUS CONTINUOUS
OUTPATIENT
Start: 2024-11-19

## 2024-11-10 RX ORDER — FENTANYL CITRATE 50 UG/ML
25 INJECTION, SOLUTION INTRAMUSCULAR; INTRAVENOUS
OUTPATIENT
Start: 2024-11-19

## 2024-11-10 NOTE — H&P
[facility]  History & Physical    Subjective:      Chief Complaint/Reason for Admission:     Marshall Almanza is a 74 y.o. female.    Past Medical History:   Diagnosis Date    Arthritis     Multiple thyroid nodules     Osteoporosis     Reflux     Senile nuclear sclerosis - Both Eyes 10/1/2013     Past Surgical History:   Procedure Laterality Date    COLONOSCOPY N/A 9/14/2020    Procedure: COLONOSCOPY;  Surgeon: Herminio Franco MD;  [facility]  Service: Endoscopy;  Laterality: N/A;  Patient would like colonoscopy 1st case of the day with me  Please recommend MiraLax 17 g in 12 oz of water 3 days in 2 days prior  With a split bowel prep.        COVID test at [facility] on 9/11-GT    COLONOSCOPY N/A 2/29/2024    Procedure: COLONOSCOPY;  Surgeon: Herminio Franco MD;  Location: [facility] (4TH FLR);  Service: Endoscopy;  Laterality: N/A;    ESOPHAGOGASTRODUODENOSCOPY N/A 2/29/2024    Procedure: EGD (ESOPHAGOGASTRODUODENOSCOPY);  Surgeon: Herminio Franco MD;  [facility]  Service: Endoscopy;  Laterality: N/A;  Procedure: EGD/Colonoscopy   golytely  2/23-precall complete-MS  2/28-pt notified of earlier arrival time (0915am)-[facility]     Social History     Tobacco Use    Smoking status: Never    Smokeless tobacco: Never   Substance Use Topics    Alcohol use: No    Drug use: No       No medications prior to admission.     Review of patient's allergies indicates:   Allergen Reactions    Ciprofloxacin      palpitations    Flagyl [metronidazole hcl]      Cause urinary frequency        Review of Systems   Eyes:  Positive for blurred vision.   All other systems reviewed and are negative.        Objective:      Vital Signs (Most Recent)       Vital Signs Range (Last 24H):  BP: ()/()   Arterial Line BP: ()/()     Physical Exam  Constitutional:       Appearance: She is well-developed.   HENT:      Head: Normocephalic.   Eyes:      Conjunctiva/sclera:  Conjunctivae normal.      Pupils: Pupils are equal, round, and reactive to light.   Cardiovascular:      Rate and Rhythm: Normal rate and regular rhythm.      Heart sounds: Normal heart sounds.   Pulmonary:      Effort: Pulmonary effort is normal.      Breath sounds: Normal breath sounds.   Abdominal:      General: Bowel sounds are normal.      Palpations: Abdomen is soft.   Musculoskeletal:         General: Normal range of motion.      Cervical back: Normal range of motion and neck supple.   Skin:     General: Skin is warm.   Neurological:      Mental Status: She is alert and oriented to person, place, and time.         ASA Score: II    Mallampati Score: II    Plan for Sedation: Moderate    Patient or Family History of Anesthesia problems : No    Any changes affecting the anesthesia assessment which may have changed since the initial assessment and H and P:  No       Data Review:    ECG:     Assessment:      Cataract OD.    Plan:    CE OD.

## 2024-11-13 ENCOUNTER — TELEPHONE (OUTPATIENT)
Dept: OPHTHALMOLOGY | Facility: CLINIC | Age: 74
End: 2024-11-13
Payer: MEDICARE

## 2024-11-18 NOTE — PRE-PROCEDURE INSTRUCTIONS
Patient reviewed on 11/18/2024.  Okay to proceed at [place]. The following pre-procedure instructions and arrival time have been reviewed with patient via phone.  Patient verbalized an understanding.  Pt to be accompanied by sister day of procedure as responsible .      Dear Marshall    Below you will find basic pre-procedure instructions in preparation for your procedure on 11/19/24 with Dr. Ford          You should receive your arrival time within 24-48 hours of your scheduled procedure date from the  at your surgeon's office.    -Nothing to eat or drink after midnight the night before your procedure until after your procedure, except AM meds with small sips of water.     - HOLD all oral Diabetic medications night before and morning of procedure  - HOLD all Insulin morning of procedure  - HOLD all Fluid pills morning of procedure  - HOLD all non-insulin shots until after surgery (Ozempic, Mounjaro, Trulicity, Victoza, Byetta, Wegovy and Adlyxin) (7 days prior)  - HOLD all vitamins, minerals and herbal supplements morning of procedure   - TAKE all B/P meds, EXCEPT those that contain a fluid pill (ex. Lasix, Hydroclorothiazide/HCTZ, Spirnolactone)  - USE inhalers as needed and bring AM of surgery  - USE EYE DROPS as directed  -TAKE blood thinner meds AM of surgery unless otherwise instructed by your provider to not take     - Shower and wash face with antibacterial soap (ex. Dial) for 3 mins PM prior and AM of surgery  - No powder, lotions, creams, oils, gels, ointments, makeup,  or jewelry    - Wear comfortable clothing (button up shirt)     (Patient is required to have a responsible ride to transport home, ride may not leave while patient is in surgery)     -- [facility], 2nd floor Surgery Center, located   @ [address] [place] [address]  2nd Floor Registration      If you have any questions or concerns please feel free to contact your surgeon's  office.        Please reply to this message as receipt of delivery.    [place], LPN [facility]  Pre-Admit - Anesthesia Dept

## 2024-11-19 ENCOUNTER — HOSPITAL ENCOUNTER (OUTPATIENT)
Facility: HOSPITAL | Age: 74
Discharge: HOME | End: 2024-11-19
Attending: OPHTHALMOLOGY | Admitting: OPHTHALMOLOGY
Payer: MEDICARE

## 2024-11-19 VITALS
RESPIRATION RATE: 18 BRPM | SYSTOLIC BLOOD PRESSURE: 124 MMHG | OXYGEN SATURATION: 99 % | TEMPERATURE: 98 F | DIASTOLIC BLOOD PRESSURE: 62 MMHG | HEIGHT: 64 IN | HEART RATE: 73 BPM | WEIGHT: 98 LBS | BODY MASS INDEX: 16.73 KG/M2

## 2024-11-19 DIAGNOSIS — H25.11 NUCLEAR SCLEROTIC CATARACT OF RIGHT EYE: Primary | ICD-10-CM

## 2024-11-19 PROCEDURE — 25000003 PHARM REV CODE 250: Performed by: OPHTHALMOLOGY

## 2024-11-19 PROCEDURE — 94761 N-INVAS EAR/PLS OXIMETRY MLT: CPT

## 2024-11-19 PROCEDURE — 27201423 OPTIME MED/SURG SUP & DEVICES STERILE SUPPLY: Performed by: OPHTHALMOLOGY

## 2024-11-19 PROCEDURE — 63600175 PHARM REV CODE 636 W HCPCS: Performed by: OPHTHALMOLOGY

## 2024-11-19 PROCEDURE — 99153 MOD SED SAME PHYS/QHP EA: CPT | Performed by: OPHTHALMOLOGY

## 2024-11-19 PROCEDURE — 66984 XCAPSL CTRC RMVL W/O ECP: CPT | Mod: HCNC,RT,, | Performed by: OPHTHALMOLOGY

## 2024-11-19 PROCEDURE — 99900035 HC TECH TIME PER 15 MIN (STAT)

## 2024-11-19 PROCEDURE — 36000706: Performed by: OPHTHALMOLOGY

## 2024-11-19 PROCEDURE — V2632 POST CHMBR INTRAOCULAR LENS: HCPCS | Performed by: OPHTHALMOLOGY

## 2024-11-19 PROCEDURE — 71000015 HC POSTOP RECOV 1ST HR: Performed by: OPHTHALMOLOGY

## 2024-11-19 PROCEDURE — 99152 MOD SED SAME PHYS/QHP 5/>YRS: CPT | Performed by: OPHTHALMOLOGY

## 2024-11-19 PROCEDURE — 36000707: Performed by: OPHTHALMOLOGY

## 2024-11-19 DEVICE — CLAREON ASPHERIC HYDROPHOBIC ACRYLIC IOL WITH THE AUTONOMEAUTOMATED PRE-LOADED DELIVERY SYSTEM
Type: IMPLANTABLE DEVICE | Site: EYE | Status: FUNCTIONAL
Brand: CLAREON™

## 2024-11-19 RX ORDER — HYDROCODONE BITARTRATE AND ACETAMINOPHEN 5; 325 MG/1; MG/1
1 TABLET ORAL EVERY 4 HOURS PRN
Status: CANCELLED | OUTPATIENT
Start: 2024-11-19

## 2024-11-19 RX ORDER — MOXIFLOXACIN 5 MG/ML
1 SOLUTION/ DROPS OPHTHALMIC
Status: DISPENSED | OUTPATIENT
Start: 2024-11-19 | End: 2024-11-19

## 2024-11-19 RX ORDER — PROPARACAINE HYDROCHLORIDE 5 MG/ML
SOLUTION/ DROPS OPHTHALMIC
Status: DISCONTINUED | OUTPATIENT
Start: 2024-11-19 | End: 2024-11-19 | Stop reason: HOSPADM

## 2024-11-19 RX ORDER — MOXIFLOXACIN 5 MG/ML
SOLUTION/ DROPS OPHTHALMIC
Status: DISCONTINUED | OUTPATIENT
Start: 2024-11-19 | End: 2024-11-19 | Stop reason: HOSPADM

## 2024-11-19 RX ORDER — PHENYLEPHRINE HYDROCHLORIDE 25 MG/ML
1 SOLUTION/ DROPS OPHTHALMIC
Status: DISPENSED | OUTPATIENT
Start: 2024-11-19 | End: 2024-11-19

## 2024-11-19 RX ORDER — EPINEPHRINE 1 MG/ML
INJECTION, SOLUTION, CONCENTRATE INTRAVENOUS
Status: DISCONTINUED | OUTPATIENT
Start: 2024-11-19 | End: 2024-11-19 | Stop reason: HOSPADM

## 2024-11-19 RX ORDER — PREDNISOLONE ACETATE 10 MG/ML
SUSPENSION/ DROPS OPHTHALMIC
Status: DISCONTINUED | OUTPATIENT
Start: 2024-11-19 | End: 2024-11-19 | Stop reason: HOSPADM

## 2024-11-19 RX ORDER — LIDOCAIN/PHENYLEPH/BSS NO.2/PF 1 %-1.5 %
SYRINGE (ML) INTRAOCULAR
Status: DISCONTINUED | OUTPATIENT
Start: 2024-11-19 | End: 2024-11-19 | Stop reason: HOSPADM

## 2024-11-19 RX ORDER — CYCLOPENTOLATE HYDROCHLORIDE 10 MG/ML
1 SOLUTION/ DROPS OPHTHALMIC
Status: DISPENSED | OUTPATIENT
Start: 2024-11-19 | End: 2024-11-19

## 2024-11-19 RX ORDER — TROPICAMIDE 10 MG/ML
1 SOLUTION/ DROPS OPHTHALMIC
Status: DISPENSED | OUTPATIENT
Start: 2024-11-19 | End: 2024-11-19

## 2024-11-19 RX ORDER — LIDOCAINE HYDROCHLORIDE 40 MG/ML
INJECTION, SOLUTION RETROBULBAR
Status: DISCONTINUED | OUTPATIENT
Start: 2024-11-19 | End: 2024-11-19 | Stop reason: HOSPADM

## 2024-11-19 RX ORDER — TETRACAINE HYDROCHLORIDE 5 MG/ML
1 SOLUTION OPHTHALMIC
Status: DISPENSED | OUTPATIENT
Start: 2024-11-19 | End: 2024-11-19

## 2024-11-19 RX ORDER — FENTANYL CITRATE 50 UG/ML
25 INJECTION, SOLUTION INTRAMUSCULAR; INTRAVENOUS EVERY 5 MIN PRN
Status: DISCONTINUED | OUTPATIENT
Start: 2024-11-19 | End: 2024-11-19 | Stop reason: HOSPADM

## 2024-11-19 RX ORDER — ACETAMINOPHEN 325 MG/1
650 TABLET ORAL EVERY 4 HOURS PRN
Status: CANCELLED | OUTPATIENT
Start: 2024-11-19

## 2024-11-19 RX ORDER — MIDAZOLAM HYDROCHLORIDE 1 MG/ML
1 INJECTION, SOLUTION INTRAMUSCULAR; INTRAVENOUS
Status: DISCONTINUED | OUTPATIENT
Start: 2024-11-19 | End: 2024-11-19 | Stop reason: HOSPADM

## 2024-11-19 RX ADMIN — TROPICAMIDE 1 DROP: 10 SOLUTION/ DROPS OPHTHALMIC at 12:11

## 2024-11-19 RX ADMIN — CYCLOPENTOLATE HYDROCHLORIDE 1 DROP: 10 SOLUTION/ DROPS OPHTHALMIC at 12:11

## 2024-11-19 RX ADMIN — FENTANYL CITRATE 25 MCG: 50 INJECTION, SOLUTION INTRAMUSCULAR; INTRAVENOUS at 01:11

## 2024-11-19 RX ADMIN — MOXIFLOXACIN OPHTHALMIC 1 DROP: 5 SOLUTION/ DROPS OPHTHALMIC at 12:11

## 2024-11-19 RX ADMIN — PHENYLEPHRINE HYDROCHLORIDE 1 DROP: 25 SOLUTION/ DROPS OPHTHALMIC at 12:11

## 2024-11-19 RX ADMIN — MIDAZOLAM HYDROCHLORIDE 1 MG: 1 INJECTION, SOLUTION INTRAMUSCULAR; INTRAVENOUS at 01:11

## 2024-11-19 RX ADMIN — TETRACAINE HYDROCHLORIDE 1 DROP: 5 SOLUTION OPHTHALMIC at 12:11

## 2024-11-19 NOTE — BRIEF OP NOTE
[facility]  Brief Operative Note    Surgery Date: 11/19/2024     Surgeons and Role:     * Gerry Ford MD - Primary    Assisting Surgeon: None    Pre-op Diagnosis:  NS (nuclear sclerosis), right [H25.11]    Post-op Diagnosis:  Post-Op Diagnosis Codes:     * NS (nuclear sclerosis), right [H25.11]    Procedure(s) (LRB):  EXTRACTION, CATARACT, WITH IOL INSERTION (Right)    Anesthesia: RN IV Sedation    Operative Findings:     Estimated Blood Loss: * No values recorded between 11/19/2024  1:10 PM and 11/19/2024  1:34 PM *         Specimens:   Specimen (24h ago, onward)      None              Discharge Note    OUTCOME: Patient tolerated treatment/procedure well without complication and is now ready for discharge.    DISPOSITION: Home or Self Care    FINAL DIAGNOSIS:  Nuclear sclerotic cataract of right eye    FOLLOWUP: In clinic    DISCHARGE INSTRUCTIONS:    Discharge Procedure Orders   Other restrictions (specify):   Order Comments: No heavy lifting or bending for 1 week.

## 2024-11-19 NOTE — DISCHARGE INSTRUCTIONS
Post Operative Instructions for Cataract Surgery- [place]    STARTING THE SAME DAY OF SURGERY:    Place one (1) drop of Prednisolone-Moxifloxacin-Bromfenac (shake bottle) into the operative eye three (3) times a day.     You will use this drop for four (4) weeks following surgery.      1 DAY POST OPERATIVE APPOINTMENT:    Date: ______________________________/ Time: ___________________________    Location: [facility]- [address], [place] [address]      Please use 1 drop of PREDNISOLONE-MOXIFLOXACIN-BROMFENAC in the operative eye before arriving for your appointment; this will help keep your eye comfortable.       RESTRICTIONS FOR SEVEN (7) DAYS FOLLOWING SURGERY:    DO NOT lift anything over 10 pounds.    DO NOT bend at the waist, only at the knees (keep head in upright position).    DO NOT rub your eye.    DO NOT get any water into the eye.    DO NOT wear any makeup, lotions, or creams on/around the eye.    Wear the protective eye shield you were give after surgery anytime you go to sleep. You may remove the shield while awake.       PLEASE NOTE:    You may take over the counter pain medication such as Tylenol or Ibuprofen as directed, if needed for pain.      *** If you experience severe pain, loss of vision, sudden onset of flashes, and/or floaters, IMMEDIATELY CALL Dr. Ford's office [phone]; AFTER HOUR [phone]; OR proceed to the EMERGENCY ROOM.

## 2024-11-19 NOTE — OP NOTE
Operative Date:  11/19/2024    Discharge Date:  11/19/2024    Discharge Patient Home    Report Title: Operative Note      SURGEON: eGrry Ford MD     ASSISTANT:     PREOPERATIVE DIAGNOSIS: Visually significant NSC cataract,  Right Eye.    POSTOPERATIVE DIAGNOSIS: Visually-significant NSC cataract,  Right Eye.    PROCEDURE PERFORMED: Phacoemulsification of the cataract with posterior chamber intraocular lens Right Eye.    ANESTHESIA:  Moderate Sedation with Local    The team confirmed the patient ID and re-evaluated the patient and sedation plan confirming it is suitable for the patient's condition and procedure prior to administering sedation.    COMPLICATIONS: None    ESTIMATED BLOOD LOSS: Minimal    INDICATIONS FOR PROCEDURE:   The patient is a pleasant 74 year old woman with increasing difficulties with activities of daily living secondary to a dense visually significant cataract in the Right Eye.  Discussions have been carried out with this patient concerning the options to surgery, risks, benefits and expectations.  The patient voiced good understanding and wished to proceed with the above procedure.    PROCEDURE IN DETAIL: The patient was brought to the operating room and received topical anesthetic to the eye and then was prepped and draped in the usual sterile fashion.  Using the Yi ring and the guarded khoa blade set at 0.37 mm, a partial thickness clear cornea incision was made temporally.  The paracentesis site was made at the twelve o'clock position.  Omidria was injected into the anterior chamber through the paracentesis.  Viscoat was then injected into the anterior chamber.  The eye was then reentered at the primary surgical site with a 2.4 mm keratome followed by continuous capsulotomy, hydrodissection, hydrodelineation and phacoemulsification of the cataract.  Residual cortical material was removed using automated irrigation-aspiration technique.  Healon was injected into the  posterior chamber and a CNAOTO 23.5 diopter lens was placed in the bag without difficulty. Residual viscoelastic was removed using automated irrigation-aspiration technique. The eye was re-pressurized using BSS solution and both the paracentesis site and the primary surgical site were demonstrated to be watertight at the end of the case with Weck--Chelsy manipulation.  One drop of Ofloxacin and one drop of Pred acetate 1% was applied to the Right Eye .The eye was closed, patched and a Garcias shield placed.  The patient was taken to the recovery room in good and stable condition.  The patient tolerated the procedure well.  The patient was instructed to refrain from any heavy lifting, bending, stooping or straining activities, discharged home  and to follow-up in the morning for routine postoperative care with Gerry Ford MD.

## 2024-11-20 ENCOUNTER — OFFICE VISIT (OUTPATIENT)
Dept: OPHTHALMOLOGY | Facility: CLINIC | Age: 74
End: 2024-11-20
Payer: MEDICARE

## 2024-11-20 DIAGNOSIS — Z98.890 POST-OPERATIVE STATE: Primary | ICD-10-CM

## 2024-11-20 PROCEDURE — 99999 PR PBB SHADOW E&M-EST. PATIENT-LVL II: CPT | Mod: PBBFAC,,, | Performed by: OPHTHALMOLOGY

## 2024-11-20 NOTE — PROGRESS NOTES
Subjective:       Patient ID: Marshall Almanza is a 74 y.o. female.    Chief Complaint: Post-op Evaluation    HPI    Here for 1 day S/p Phaco w/IOL OD 11-19-24    Eye meds: PMB TID OD     74 year old female had patch removed and area cleaned. States she can see   better, but c/o feeling something in her eye. Denies ocular pain.   Last edited by Cordelia Luong on 11/20/2024  8:25 AM.             Assessment:       1. Post-operative state        Plan:       S/p CE OD- Doing well.          CPM OD.  RTC 1 wk.

## 2024-11-27 ENCOUNTER — OFFICE VISIT (OUTPATIENT)
Dept: OPHTHALMOLOGY | Facility: CLINIC | Age: 74
End: 2024-11-27
Payer: MEDICARE

## 2024-11-27 DIAGNOSIS — Z98.890 POST-OPERATIVE STATE: Primary | ICD-10-CM

## 2024-11-27 DIAGNOSIS — H25.12 NUCLEAR SCLEROSIS OF LEFT EYE: ICD-10-CM

## 2024-11-27 PROCEDURE — 99999 PR PBB SHADOW E&M-EST. PATIENT-LVL I: CPT | Mod: PBBFAC,,, | Performed by: OPHTHALMOLOGY

## 2024-11-27 NOTE — PROGRESS NOTES
Subjective:       Patient ID: Marshall Almanza is a 74 y.o. female.    Chief Complaint: Post-op Evaluation    HPI    Here for 1 week S/p Phaco w/IOL OD 11-19-24    Eye meds: PMB TID OD     74 year old female states is stable in the right eye. Denies ocular pain.   Denies flashes, floater and diplopia. States she wants to have left eye   done in January   Last edited by Cordelia Luong on 11/27/2024  8:27 AM.             Assessment:       1. Post-operative state    2. Nuclear sclerosis of left eye        Plan:       S/p CE OD- Doing well.    Visually significant cataract OS -Pt. Wants Sx in Jan/2025.      CPM OD.  Pt will call [facility] to schedule CE OS.  RTC 3 wks.

## 2024-12-02 ENCOUNTER — TELEPHONE (OUTPATIENT)
Dept: OPHTHALMOLOGY | Facility: CLINIC | Age: 74
End: 2024-12-02
Payer: MEDICARE

## 2024-12-02 DIAGNOSIS — H25.12 NS (NUCLEAR SCLEROSIS), LEFT: Primary | ICD-10-CM

## 2024-12-10 ENCOUNTER — OFFICE VISIT (OUTPATIENT)
Dept: ENDOCRINOLOGY | Facility: CLINIC | Age: 74
End: 2024-12-10
Payer: MEDICARE

## 2024-12-10 ENCOUNTER — TELEPHONE (OUTPATIENT)
Dept: SPORTS MEDICINE | Facility: CLINIC | Age: 74
End: 2024-12-10
Payer: MEDICARE

## 2024-12-10 ENCOUNTER — LAB VISIT (OUTPATIENT)
Dept: LAB | Facility: HOSPITAL | Age: 74
End: 2024-12-10
Attending: INTERNAL MEDICINE
Payer: MEDICARE

## 2024-12-10 VITALS
SYSTOLIC BLOOD PRESSURE: 130 MMHG | HEART RATE: 91 BPM | DIASTOLIC BLOOD PRESSURE: 72 MMHG | BODY MASS INDEX: 16.8 KG/M2 | WEIGHT: 97.88 LBS

## 2024-12-10 DIAGNOSIS — M81.0 SENILE OSTEOPOROSIS: Primary | ICD-10-CM

## 2024-12-10 DIAGNOSIS — M81.0 SENILE OSTEOPOROSIS: ICD-10-CM

## 2024-12-10 DIAGNOSIS — M81.0 AGE-RELATED OSTEOPOROSIS WITHOUT CURRENT PATHOLOGICAL FRACTURE: Primary | ICD-10-CM

## 2024-12-10 LAB
25(OH)D3+25(OH)D2 SERPL-MCNC: 61 NG/ML (ref 30–96)
ALBUMIN SERPL BCP-MCNC: 3.8 G/DL (ref 3.5–5.2)
ANION GAP SERPL CALC-SCNC: 6 MMOL/L (ref 8–16)
BUN SERPL-MCNC: 18 MG/DL (ref 8–23)
CALCIUM SERPL-MCNC: 9.2 MG/DL (ref 8.7–10.5)
CHLORIDE SERPL-SCNC: 100 MMOL/L (ref 95–110)
CO2 SERPL-SCNC: 30 MMOL/L (ref 23–29)
CREAT SERPL-MCNC: 0.8 MG/DL (ref 0.5–1.4)
EST. GFR  (NO RACE VARIABLE): >60 ML/MIN/1.73 M^2
GLUCOSE SERPL-MCNC: 128 MG/DL (ref 70–110)
PHOSPHATE SERPL-MCNC: 3.2 MG/DL (ref 2.7–4.5)
POTASSIUM SERPL-SCNC: 4.7 MMOL/L (ref 3.5–5.1)
PTH-INTACT SERPL-MCNC: 33.3 PG/ML (ref 9–77)
SODIUM SERPL-SCNC: 136 MMOL/L (ref 136–145)

## 2024-12-10 PROCEDURE — 99999 PR PBB SHADOW E&M-EST. PATIENT-LVL III: CPT | Mod: PBBFAC,,, | Performed by: INTERNAL MEDICINE

## 2024-12-10 PROCEDURE — 3078F DIAST BP <80 MM HG: CPT | Mod: CPTII,S$GLB,, | Performed by: INTERNAL MEDICINE

## 2024-12-10 PROCEDURE — 1159F MED LIST DOCD IN RCRD: CPT | Mod: CPTII,S$GLB,, | Performed by: INTERNAL MEDICINE

## 2024-12-10 PROCEDURE — 99204 OFFICE O/P NEW MOD 45 MIN: CPT | Mod: S$GLB,,, | Performed by: INTERNAL MEDICINE

## 2024-12-10 PROCEDURE — 3075F SYST BP GE 130 - 139MM HG: CPT | Mod: CPTII,S$GLB,, | Performed by: INTERNAL MEDICINE

## 2024-12-10 PROCEDURE — 36415 COLL VENOUS BLD VENIPUNCTURE: CPT | Performed by: INTERNAL MEDICINE

## 2024-12-10 PROCEDURE — 1126F AMNT PAIN NOTED NONE PRSNT: CPT | Mod: CPTII,S$GLB,, | Performed by: INTERNAL MEDICINE

## 2024-12-10 PROCEDURE — 3288F FALL RISK ASSESSMENT DOCD: CPT | Mod: CPTII,S$GLB,, | Performed by: INTERNAL MEDICINE

## 2024-12-10 PROCEDURE — 83970 ASSAY OF PARATHORMONE: CPT | Mod: HCNC | Performed by: INTERNAL MEDICINE

## 2024-12-10 PROCEDURE — 1101F PT FALLS ASSESS-DOCD LE1/YR: CPT | Mod: CPTII,S$GLB,, | Performed by: INTERNAL MEDICINE

## 2024-12-10 PROCEDURE — 80069 RENAL FUNCTION PANEL: CPT | Mod: HCNC | Performed by: INTERNAL MEDICINE

## 2024-12-10 PROCEDURE — 82306 VITAMIN D 25 HYDROXY: CPT | Mod: HCNC | Performed by: INTERNAL MEDICINE

## 2024-12-10 PROCEDURE — 1160F RVW MEDS BY RX/DR IN RCRD: CPT | Mod: CPTII,S$GLB,, | Performed by: INTERNAL MEDICINE

## 2024-12-10 RX ORDER — MULTIVIT,IRON,MINERALS/LUTEIN
TABLET ORAL
COMMUNITY

## 2024-12-10 NOTE — TELEPHONE ENCOUNTER
Called and left voicemail for patient in regards to appointment scheduled with Dr. Zeng on 12/16/24

## 2024-12-10 NOTE — PATIENT INSTRUCTIONS
Start taking alendronate (Fosamax) 70 milligrams one tablet each WEEK. Please take alendronate on an empty stomach with a full glass of water. After your weekly dose of alendronate, please do not eat , drink or take other medications for one hour and stay upright for at least 60 minutes in order to ensure passage of the pill to the intestine and adequate absorption.    On the days you take fosamax, take your calcium supplements in the afternoon instead of the morning. Read the links below for more information regarding side-effects and how to take the medication.    Medication guide: https://www.[employer]/product/usa/pi_circulars/f/fosamax/fosamax_mg.pdf           Take calcium total 1200 mg a day from diet and supplements.   Avoid bending forwards at the waist and deep twisting.  Continue weight bearing exercises like walking and do light weights.  Take fall precautions.  Call if you have any new fractures .  Get regular dental visit and let your dentist know that you are on osteoporosis medication.        Please see links below for further information:    Exercise/safe movement:  https://www.bonehealthandosteoporosis.org/patients/treatment/exercisesafe-movement/    Calcium content of common foods:  https://www.[facility].org/education/calcium-content-of-foods

## 2024-12-10 NOTE — PROGRESS NOTES
ENDOCRINOLOGY CLINIC PATIENT NOTE    Subjective:        Patient ID: Marshall Almanza is referred by Kasia Garcia MD     Chief Complaint:  Osteoporosis    HPI:   Marshall Almanza is a 74 y.o. female who presents for evaluation of osteoporosis.  Patient had previously seen  in [id number] for osteoporosis and thyroid nodules.      T scores   Year Lumbar Spine Hip Femoral neck   2021 -1.2 -2.7 -2.6     BMD: T-score and % change  Year Lumbar Spine %ch vs prior Hip %ch. vs prior Femoral neck   05/2024 -1.5 -3.1% -2.9 Stable -2.8       FRAX 10 year probability of fracture:   Major Osteoporotic Fracture:  8.7%   Hip Fracture:  3.2%      Lab Results   Component Value Date    CALCIUM 9.6 05/20/2024    ALBUMIN 3.9 05/20/2024    CREATININE 0.8 05/20/2024    ALKPHOS 59 05/20/2024    BHSBZPMY01CD 54 10/31/2023    TSH 1.676 05/20/2024    EGFRNORACEVR >60.0 05/20/2024      Current treatment: Denies    Previous Treatment:  Per notes, patient had taken Fosamax for 2 years, discontinued for 2 years and restarted around 5461-5653, reportedly discontinued 3 years back for dental work.  She had been recommended Prolia at the time due to decline in her BMD but did not start it.      History of previous fracture: No  Parent with fractured hip: No  Smoking status: No  Glucocorticoid use: No  History of RA: No  Alcohol 3 or more/day: No  Nephrolithiasis: No  Diabetes: No  Frequent falls: No  Loss of height: No  Menopause:  Around 48 years of age    CKD: No  Cancer/XRT: No      Body mass index is 16.8 kg/m².   H/o IBS and GERD  Appetite is good, takes small frequent meals.   Has difficulty gaining weight for the past 6-7 years.   Highest weight was 109 lbs, now at 97 lbs.     Dental visits:  Every 6 months. Completed dental work, now gets regular cleaning. Had tooth infection s/p extraction.     GERD: Yes on PPI intermittently . Started in 1999, on and off symptoms.  She had tolerated the alendronate.  PUD:  No    Supplements:   Ca: None, takes centrum silver. Takes soy milk daily.  Has lactose intolerance.    VitD: 2000 IU daily.     Exercise: Walking regularly.         H/o Multiple thyroid nodules - Following up with PCP.   Reportedly planned to repeat US in 2025.   Denies FNA biopsy    US thyroid 2020:  Impression:     Multinodular thyroid gland as detailed above.  None of the nodules meet the imaging criteria for recommendation for biopsy.  The solid nodule with punctate echogenic foci within the cephalad aspect of the left lobe has remains stable dating back to ultrasound examination dated 09/08/2015.  No additional imaging follow-up is recommended.      Family history:   Osteoporosis: Denies  Thyroid: Denies      ROS: see HPI     Objective:     Physical Exam     /72 (BP Location: Right arm, Patient Position: Sitting)   Pulse 91   Wt 44.4 kg (97 lb 14.2 oz)   LMP 08/29/1998   BMI 16.80 kg/m²     Wt Readings from Last 3 Encounters:   12/10/24 44.4 kg (97 lb 14.2 oz)   11/19/24 44.5 kg (98 lb)   08/26/24 43.6 kg (96 lb 0.2 oz)       Constitutional:  Pleasant, in no acute distress.   HENT:   Head:    Normocephalic and atraumatic.   Eyes:    EOMI. No scleral icterus.   Cardiovascular:  Normal rate  Respiratory:   Effort normal   Neurological:  Normal speech      LABORATORY REVIEW:    Chemistry        Component Value Date/Time     05/20/2024 0735    K 4.6 05/20/2024 0735     05/20/2024 0735    CO2 27 05/20/2024 0735    BUN 19 05/20/2024 0735    CREATININE 0.8 05/20/2024 0735    GLU 96 05/20/2024 0735        Component Value Date/Time    CALCIUM 9.6 05/20/2024 0735    ALKPHOS 59 05/20/2024 0735    AST 25 05/20/2024 0735    ALT 19 05/20/2024 0735    BILITOT 0.7 05/20/2024 0735    ESTGFRAFRICA >60.0 10/15/2021 0742    EGFRNONAA >60.0 10/15/2021 0742          Lab Results   Component Value Date    HGBA1C 5.6 11/17/2015    HGBA1C 5.7 07/12/2013    HGBA1C 5.7 07/16/2012         Assessment/Plan:     1.  Age-related osteoporosis without current pathological fracture  Assessment & Plan:    Denies history of fragility fractures.   Prior Fosamax use, reportedly discontinued 3 years back for dental work.  She had been recommended Prolia in 2021 due to decline in her BMD but did not start it.  Recent DXA scan showed further decrease in her BMD.  Discussed doing parenteral medications including anabolic agents, Reclast or Prolia.  Patient says she does not want to take these medications and wants to restart the Fosamax.  Discussed possible continued decline in her BMD.  She has previously tolerated the Fosamax well.    Labs ordered for evaluation of her bone loss.  Will discuss further management and alternative osteoporosis medications after the results.    Discussed her risk of fractures and benefits of osteoporosis treatment  Side effects including osteonecrosis of the jaw and atypical femoral fractures discussed with patient    Encouraged safe movements and fall precautions  Continue routine dental visits  Instructed on Calcium and vitamin D       Orders:  -     Ambulatory referral/consult to Endocrinology       Follow-up in 6 months.    Felipa Hull MD

## 2024-12-11 NOTE — ASSESSMENT & PLAN NOTE
Denies history of fragility fractures.   Prior Fosamax use, reportedly discontinued 3 years back for dental work.  She had been recommended Prolia in 2021 due to decline in her BMD but did not start it.  Recent DXA scan showed further decrease in her BMD.  Discussed doing parenteral medications including anabolic agents, Reclast or Prolia.  Patient says she does not want to take these medications and wants to restart the Fosamax.  Discussed possible continued decline in her BMD.  She has previously tolerated the Fosamax well.    Labs ordered for evaluation of her bone loss.  Will discuss further management and alternative osteoporosis medications after the results.    Discussed her risk of fractures and benefits of osteoporosis treatment  Side effects including osteonecrosis of the jaw and atypical femoral fractures discussed with patient    Encouraged safe movements and fall precautions  Continue routine dental visits  Instructed on Calcium and vitamin D

## 2024-12-12 ENCOUNTER — LAB VISIT (OUTPATIENT)
Dept: LAB | Facility: HOSPITAL | Age: 74
End: 2024-12-12
Attending: INTERNAL MEDICINE
Payer: MEDICARE

## 2024-12-12 DIAGNOSIS — M81.0 SENILE OSTEOPOROSIS: ICD-10-CM

## 2024-12-12 LAB
CALCIUM 24H UR-MRATE: 5 MG/HR (ref 4–12)
CALCIUM UR-MCNC: 6.3 MG/DL (ref 0–15)
CALCIUM URINE (MG/SPEC): 110 MG/SPEC
CREAT 24H UR-MRATE: 22.6 MG/HR (ref 40–75)
CREAT UR-MCNC: 31 MG/DL (ref 15–325)
CREATININE, URINE (MG/SPEC): 542.5 MG/SPEC
URINE COLLECTION DURATION: 24 HR
URINE COLLECTION DURATION: 24 HR
URINE VOLUME: 1750 ML
URINE VOLUME: 1750 ML

## 2024-12-12 PROCEDURE — 82570 ASSAY OF URINE CREATININE: CPT | Mod: HCNC | Performed by: INTERNAL MEDICINE

## 2024-12-12 PROCEDURE — 82340 ASSAY OF CALCIUM IN URINE: CPT | Mod: HCNC | Performed by: INTERNAL MEDICINE

## 2024-12-16 ENCOUNTER — OFFICE VISIT (OUTPATIENT)
Dept: SPORTS MEDICINE | Facility: CLINIC | Age: 74
End: 2024-12-16
Payer: MEDICARE

## 2024-12-16 VITALS — WEIGHT: 97 LBS | DIASTOLIC BLOOD PRESSURE: 79 MMHG | BODY MASS INDEX: 16.65 KG/M2 | SYSTOLIC BLOOD PRESSURE: 139 MMHG

## 2024-12-16 DIAGNOSIS — M25.512 CHRONIC LEFT SHOULDER PAIN: Primary | ICD-10-CM

## 2024-12-16 DIAGNOSIS — G89.29 CHRONIC LEFT SHOULDER PAIN: Primary | ICD-10-CM

## 2024-12-16 DIAGNOSIS — M75.42 SUBACROMIAL IMPINGEMENT, LEFT: ICD-10-CM

## 2024-12-16 DIAGNOSIS — M50.30 DDD (DEGENERATIVE DISC DISEASE), CERVICAL: ICD-10-CM

## 2024-12-16 PROCEDURE — 99499 UNLISTED E&M SERVICE: CPT | Mod: HCNC,S$GLB,, | Performed by: STUDENT IN AN ORGANIZED HEALTH CARE EDUCATION/TRAINING PROGRAM

## 2024-12-16 PROCEDURE — 20611 DRAIN/INJ JOINT/BURSA W/US: CPT | Mod: HCNC,LT,S$GLB, | Performed by: STUDENT IN AN ORGANIZED HEALTH CARE EDUCATION/TRAINING PROGRAM

## 2024-12-16 PROCEDURE — 99999 PR PBB SHADOW E&M-EST. PATIENT-LVL III: CPT | Mod: PBBFAC,HCNC,, | Performed by: STUDENT IN AN ORGANIZED HEALTH CARE EDUCATION/TRAINING PROGRAM

## 2024-12-16 RX ORDER — TRIAMCINOLONE ACETONIDE 40 MG/ML
40 INJECTION, SUSPENSION INTRA-ARTICULAR; INTRAMUSCULAR
Status: DISCONTINUED | OUTPATIENT
Start: 2024-12-16 | End: 2024-12-16 | Stop reason: HOSPADM

## 2024-12-16 RX ADMIN — TRIAMCINOLONE ACETONIDE 40 MG: 40 INJECTION, SUSPENSION INTRA-ARTICULAR; INTRAMUSCULAR at 03:12

## 2024-12-16 NOTE — PROCEDURES
Large Joint Aspiration/Injection: L subacromial bursa    Date/Time: 12/16/2024 3:00 PM    Performed by: Janene Zeng MD  Authorized by: Janene Zeng MD    Consent Done?:  Yes (Verbal)  Indications:  Pain  Site marked: the procedure site was marked    Timeout: prior to procedure the correct patient, procedure, and site was verified      Local anesthesia used?: Yes    Local anesthetic:  Co-phenylcaine spray    Details:  Needle Size:  21 G  Ultrasonic Guidance for needle placement?: Yes (Ultrasound guidance used to avoid neurovascular injury.)    Images are saved and documented.  Approach:  Lateral  Location:  Shoulder  Site:  L subacromial bursa  Medications:  40 mg triamcinolone acetonide 40 mg/mL  Medications comment:  Ropivacaine 0.2% 2mL  Patient tolerance:  Patient tolerated the procedure well with no immediate complications     TECHNIQUE: Real time ultrasound examination of the left subacromial bursa(e) was performed with SonoSite Edge 2, 9-L MHz linear probe(s). Ultrasound guidance was used for needle localization. Images were saved and stored for documentation.  Dynamic visualization of the needle was continuous throughout the procedures and maintained in good position.

## 2024-12-16 NOTE — PROGRESS NOTES
CC: left shoulder pain    74 y.o. Female presents today for follow up evaluation of her left shoulder pain. Pt reports minimal improvement with tylenol, voltaren gel, and HEP since last visit. Pt states she would like to move forward with injection. Pt localizes pain to posterior shoulder. Pt reports pain is 8/10 today. Pt denies mechanical symptoms. Pt denies numbness/tingling.     Attempted treatments: tylenol extra strength 500mg PRN (minimal relief), voltaren gel (minimal relief), HEP (daily)  Pain score: 8/10  History of trauma/injury: none since last visit  Affecting ADLs: yes      REVIEW OF SYSTEMS:   Constitution: Patient denies fever or chills.  Eyes: Patient denies eye pain or vision changes.  HEENT: Patient denies ear pain, sore throat, or nasal discharge.  CVS: Patient denies chest pain.  Lungs: Patient denies shortness of breath or cough.  Skin: Patient denies skin rash or itching.    Musculoskeletal: Patient denies recent falls. See HPI.  Psych: Patient denies any current anxiety or nervousness.    PAST MEDICAL HISTORY:   Past Medical History:   Diagnosis Date    Arthritis     Multiple thyroid nodules     Osteoporosis     Reflux     Senile nuclear sclerosis - Both Eyes 10/1/2013       MEDICATIONS:     Current Outpatient Medications:     chlorhexidine (PERIDEX) 0.12 % solution, , Disp: , Rfl:     dicyclomine (BENTYL) 20 mg tablet, TAKE 1 TABLET BY MOUTH 3 TIMES A DAY AS NEEDED FOR ABDOMINAL PAIN, Disp: 120 tablet, Rfl: 1    esomeprazole (NEXIUM) 40 MG capsule, Take 1 capsule (40 mg total) by mouth once daily., Disp: 90 capsule, Rfl: 3    multivit-min-iron-FA-vit K-lut (CENTRUM SILVER WOMEN) 8 mg iron-400 mcg-50 mcg Tab, Take by mouth., Disp: , Rfl:     prednisoLONE-moxiflox-bromfen 1-0.5-0.075 % DrpS, Place 1 drop into the right eye 3 (three) times daily., Disp: 8 mL, Rfl: 2    hydrocortisone (ANUSOL-HC) 2.5 % rectal cream, Place rectally 2 (two) times daily. (Patient not taking: Reported on  12/16/2024), Disp: 30 g, Rfl: 1    hyoscyamine (ANASPAZ,LEVSIN) 0.125 mg Tab, Take 1 tablet (125 mcg total) by mouth every 6 (six) hours as needed (abdominal pain). (Patient not taking: Reported on 12/16/2024), Disp: 90 tablet, Rfl: 1    multivitamin (THERAGRAN) per tablet, Take 1 tablet by mouth Daily. (Patient not taking: Reported on 12/16/2024), Disp: , Rfl:     ALLERGIES:   Review of patient's allergies indicates:   Allergen Reactions    Ciprofloxacin      palpitations    Flagyl [metronidazole hcl]      Cause urinary frequency        PHYSICAL EXAMINATION:  /79 (BP Location: Right arm, Patient Position: Sitting)   Wt 44 kg (97 lb)   LMP 08/29/1998   BMI 16.65 kg/m²   Vitals signs and nursing note have been reviewed.    General: In no acute distress, well developed, well nourished, no diaphoresis  Eyes: EOM full and smooth, no eye redness or discharge  HENT: normocephalic and atraumatic, neck supple, trachea midline, no nasal discharge  Cardiovascular: no LE edema  Lungs: respirations non-labored, no conversational dyspnea   Neuro: AAOx3, CN2-12 grossly intact  Skin: No rashes, warm and dry  Psychiatric: cooperative, pleasant, mood and affect appropriate for age    ASSESSMENT:      ICD-10-CM ICD-9-CM   1. Chronic left shoulder pain  M25.512 719.41    G89.29 338.29   2. Subacromial impingement, left  M75.42 726.19   3. DDD (degenerative disc disease), cervical  M50.30 722.4         PLAN:  Ultrasound-guided injections of the left subacromial bursa with triamcinolone performed at today's visit.    Risks and benefits were discussed with patient prior to receiving injection.  Depending on injection type, risks include the possibility of infection, pain, disruptions in blood pressure and blood sugar, and cosmetic deformity at site of injection.    All questions were answered to the best of my ability and all concerns were addressed at this time.    Follow up in 6-8 weeks, or sooner if need be.    This note is  dictated using the [employer] Fluency Direct word recognition program. There are word recognition mistakes that are occasionally missed on review.

## 2024-12-17 ENCOUNTER — TELEPHONE (OUTPATIENT)
Dept: ENDOCRINOLOGY | Facility: CLINIC | Age: 74
End: 2024-12-17
Payer: MEDICARE

## 2024-12-17 NOTE — TELEPHONE ENCOUNTER
Called patient and discussed her recent lab results which were within normal ranges. Continue recommended calcium and vitamin-D.    Patient has been hesitant about doing IV medication.  I discussed again starting either Prolia or Reclast. She does not want to do the Prolia as she is concerned about side effects and has known people who have taken the medication.  I discussed doing Reclast once a year infusion and discussed the risk and benefits of the medication.  Discussed side effects including flu-like symptoms, muscle and bone pain, dehydration and hypocalcemia.  Discussed risk of osteonecrosis with antiresorptive medications.    Patient says she wants to look into the medication and think about it for a few days.  She will contact my office with her decision if wants to start the medication.  Alternatively, she wants to restart the Fosamax.

## 2024-12-18 ENCOUNTER — OFFICE VISIT (OUTPATIENT)
Dept: OPHTHALMOLOGY | Facility: CLINIC | Age: 74
End: 2024-12-18
Payer: MEDICARE

## 2024-12-18 DIAGNOSIS — H25.12 NUCLEAR SCLEROSIS OF LEFT EYE: ICD-10-CM

## 2024-12-18 DIAGNOSIS — Z98.890 POST-OPERATIVE STATE: Primary | ICD-10-CM

## 2024-12-18 PROCEDURE — 1160F RVW MEDS BY RX/DR IN RCRD: CPT | Mod: CPTII,S$GLB,, | Performed by: OPHTHALMOLOGY

## 2024-12-18 PROCEDURE — 92136 OPHTHALMIC BIOMETRY: CPT | Mod: 26,LT,S$GLB, | Performed by: OPHTHALMOLOGY

## 2024-12-18 PROCEDURE — 99999 PR PBB SHADOW E&M-EST. PATIENT-LVL I: CPT | Mod: PBBFAC,,, | Performed by: OPHTHALMOLOGY

## 2024-12-18 PROCEDURE — 99024 POSTOP FOLLOW-UP VISIT: CPT | Mod: S$GLB,,, | Performed by: OPHTHALMOLOGY

## 2024-12-18 PROCEDURE — 1159F MED LIST DOCD IN RCRD: CPT | Mod: CPTII,S$GLB,, | Performed by: OPHTHALMOLOGY

## 2024-12-18 NOTE — PROGRESS NOTES
"Subjective:       Patient ID: Marshall Almanza is a 74 y.o. female.    Chief Complaint: Post-op Evaluation    HPI    Here for 3 week S/p Phaco w/IOL OD 11-19-24    Eye meds: None    74 year old female states is stable in the right eye and looking forward   to getting OS done 1/7/25. Denies ocular pain. Denies flashes, floater and   diplopia. Pt feels her OD is "overworking",  bc OS is blurry   Last edited by Cordelia Luong on 12/18/2024  1:06 PM.             Assessment:       1. Post-operative state    2. Nuclear sclerosis of left eye        Plan:       S/p CE OD- Doing well.    Visually significant cataract OS -Pt. Wants Sx.        CPM OD.  Cataract Surgery Consent: Patient with a visually significant cataract with difficulties of ADLs, reading, driving, night vision, glare (any and all).  Discussed with Patient/Family/Caregiver: options, risks and benefits, expectations of cataract surgery, utilized an eye model with questions and answers to facilitate discussion.  Discussed lens options and patient understands that glasses may be required for optimal vision for distance and/or near vision after cataract surgery.  The Patient/Family/Caregiver  voice good understanding and patient wishes to proceed with surgery.  The patient will likely benefit from surgery and patient signed consent for Left Eye.  CE OS 1/7/25.    "

## 2024-12-30 ENCOUNTER — TELEPHONE (OUTPATIENT)
Dept: OPHTHALMOLOGY | Facility: CLINIC | Age: 74
End: 2024-12-30
Payer: MEDICARE

## 2025-01-05 NOTE — H&P
[facility]  History & Physical    Subjective:      Chief Complaint/Reason for Admission:     Marshall Almanza is a 74 y.o. female.    Past Medical History:   Diagnosis Date    Arthritis     Multiple thyroid nodules     Osteoporosis     Reflux     Senile nuclear sclerosis - Both Eyes 10/1/2013     Past Surgical History:   Procedure Laterality Date    CATARACT EXTRACTION W/  INTRAOCULAR LENS IMPLANT Right 11/19/2024    Procedure: EXTRACTION, CATARACT, WITH IOL INSERTION;  Surgeon: Gerry Ford MD;  Location: [facility] OR;  Service: Ophthalmology;  Laterality: Right;    COLONOSCOPY N/A 9/14/2020    Procedure: COLONOSCOPY;  Surgeon: Herminio Franco MD;  Location: [facility] (4TH FLR);  Service: Endoscopy;  Laterality: N/A;  Patient would like colonoscopy 1st case of the day with me  Please recommend MiraLax 17 g in 12 oz of water 3 days in 2 days prior  With a split bowel prep.        COVID test at [facility] on 9/11-GT    COLONOSCOPY N/A 2/29/2024    Procedure: COLONOSCOPY;  Surgeon: Herminio Franco MD;  [facility]  Service: Endoscopy;  Laterality: N/A;    ESOPHAGOGASTRODUODENOSCOPY N/A 2/29/2024    Procedure: EGD (ESOPHAGOGASTRODUODENOSCOPY);  Surgeon: Herminio Franco MD;  [facility]  Service: Endoscopy;  Laterality: N/A;  Procedure: EGD/Colonoscopy   golytely  2/23-precall complete-MS  2/28-pt notified of earlier arrival time (0915am)-[facility]     Social History     Tobacco Use    Smoking status: Never    Smokeless tobacco: Never   Substance Use Topics    Alcohol use: No    Drug use: No       No medications prior to admission.     Review of patient's allergies indicates:   Allergen Reactions    Ciprofloxacin      palpitations    Flagyl [metronidazole hcl]      Cause urinary frequency        Review of Systems   Eyes:  Positive for blurred vision.   All other systems reviewed and are negative.        Objective:      Vital Signs (Most  Recent)       Vital Signs Range (Last 24H):  BP: ()/()   Arterial Line BP: ()/()     Physical Exam  Constitutional:       Appearance: She is well-developed.   HENT:      Head: Normocephalic.   Eyes:      Conjunctiva/sclera: Conjunctivae normal.      Pupils: Pupils are equal, round, and reactive to light.   Cardiovascular:      Rate and Rhythm: Normal rate and regular rhythm.      Heart sounds: Normal heart sounds.   Pulmonary:      Effort: Pulmonary effort is normal.      Breath sounds: Normal breath sounds.   Abdominal:      General: Bowel sounds are normal.      Palpations: Abdomen is soft.   Musculoskeletal:         General: Normal range of motion.      Cervical back: Normal range of motion and neck supple.   Skin:     General: Skin is warm.   Neurological:      Mental Status: She is alert and oriented to person, place, and time.         ASA Score: II    Mallampati Score: II    Plan for Sedation: Moderate    Patient or Family History of Anesthesia problems : No    Any changes affecting the anesthesia assessment which may have changed since the initial assessment and H and P:  No       Data Review:    ECG:     Assessment:      Cataract OS.    Plan:    CE OS.

## 2025-01-06 NOTE — PRE-PROCEDURE INSTRUCTIONS
You will receive an arrival time from your surg's office that is roughly 1 hour before your anticipated procedure time to allow sufficient time for pre-op..      - Nothing to eat or drink after midinight, except AM meds with small sips of water or Gatorade/Powerade (no red)  - Hold all Diabetic meds AM of surgery  - Hold all Insulin AM of surgery  - Hold all Fluid pills AM of surgery  - Hold all non-insulin shots until after surgery (Ozempic, Mounjaro, Trulicity, Victoza, Byetta, Wegovy, Adlyxin, etc...) (7 days prior)  - Take all B/P meds, except those that contain a fluid pill  - Hold all vits and herbal meds for 7 days prior to surgery, if possible  - Use inhalers as needed and bring AM of surgery  - Take blood thinner meds AM of surgery (unless directed otherwise)  - Use eye drops as directed and/or instructed  - Shower or Bathe and wash face with dial soap for 3 mins PM prior and AM of surgery  - No powder, lotions, creams, oils, gels, ointments, makeup,  or jewelry    - Wear comfortable clothing (button up shirt)      (Patients ride may not leave while patient is in surgery)      -- 2nd floor surgery ctr at [facility] @ [address] [facility] [address] [place] [address]      Pt voiced understanding    Thank you,  Eloisa

## 2025-01-07 ENCOUNTER — HOSPITAL ENCOUNTER (OUTPATIENT)
Facility: HOSPITAL | Age: 75
Discharge: HOME | End: 2025-01-07
Attending: OPHTHALMOLOGY | Admitting: OPHTHALMOLOGY
Payer: MEDICARE

## 2025-01-07 VITALS
HEART RATE: 73 BPM | TEMPERATURE: 97 F | OXYGEN SATURATION: 100 % | HEIGHT: 64 IN | RESPIRATION RATE: 16 BRPM | DIASTOLIC BLOOD PRESSURE: 60 MMHG | WEIGHT: 99 LBS | SYSTOLIC BLOOD PRESSURE: 124 MMHG | BODY MASS INDEX: 16.9 KG/M2

## 2025-01-07 DIAGNOSIS — H25.12 NUCLEAR SCLEROTIC CATARACT OF LEFT EYE: Primary | ICD-10-CM

## 2025-01-07 PROCEDURE — 94761 N-INVAS EAR/PLS OXIMETRY MLT: CPT

## 2025-01-07 PROCEDURE — 99900035 HC TECH TIME PER 15 MIN (STAT)

## 2025-01-07 PROCEDURE — 27201423 OPTIME MED/SURG SUP & DEVICES STERILE SUPPLY: Performed by: OPHTHALMOLOGY

## 2025-01-07 PROCEDURE — 63600175 PHARM REV CODE 636 W HCPCS: Performed by: OPHTHALMOLOGY

## 2025-01-07 PROCEDURE — V2632 POST CHMBR INTRAOCULAR LENS: HCPCS | Performed by: OPHTHALMOLOGY

## 2025-01-07 PROCEDURE — 25000003 PHARM REV CODE 250: Performed by: OPHTHALMOLOGY

## 2025-01-07 PROCEDURE — 99152 MOD SED SAME PHYS/QHP 5/>YRS: CPT | Performed by: OPHTHALMOLOGY

## 2025-01-07 PROCEDURE — 36000706: Performed by: OPHTHALMOLOGY

## 2025-01-07 PROCEDURE — 71000015 HC POSTOP RECOV 1ST HR: Performed by: OPHTHALMOLOGY

## 2025-01-07 PROCEDURE — 99153 MOD SED SAME PHYS/QHP EA: CPT | Performed by: OPHTHALMOLOGY

## 2025-01-07 PROCEDURE — 66984 XCAPSL CTRC RMVL W/O ECP: CPT | Mod: 79,HCNC,LT, | Performed by: OPHTHALMOLOGY

## 2025-01-07 PROCEDURE — 36000707: Performed by: OPHTHALMOLOGY

## 2025-01-07 DEVICE — LENS CLAREON AUTONOME 23.5D: Type: IMPLANTABLE DEVICE | Site: EYE | Status: FUNCTIONAL

## 2025-01-07 RX ORDER — HYDROCODONE BITARTRATE AND ACETAMINOPHEN 5; 325 MG/1; MG/1
1 TABLET ORAL EVERY 4 HOURS PRN
Status: DISCONTINUED | OUTPATIENT
Start: 2025-01-07 | End: 2025-01-07 | Stop reason: HOSPADM

## 2025-01-07 RX ORDER — LIDOCAINE HYDROCHLORIDE 40 MG/ML
INJECTION, SOLUTION RETROBULBAR
Status: DISCONTINUED | OUTPATIENT
Start: 2025-01-07 | End: 2025-01-07 | Stop reason: HOSPADM

## 2025-01-07 RX ORDER — MOXIFLOXACIN 5 MG/ML
1 SOLUTION/ DROPS OPHTHALMIC EVERY 5 MIN PRN
Status: COMPLETED | OUTPATIENT
Start: 2025-01-07 | End: 2025-01-07

## 2025-01-07 RX ORDER — TETRACAINE HYDROCHLORIDE 5 MG/ML
1 SOLUTION OPHTHALMIC
Status: DISCONTINUED | OUTPATIENT
Start: 2025-01-07 | End: 2025-01-07

## 2025-01-07 RX ORDER — FENTANYL CITRATE 50 UG/ML
25 INJECTION, SOLUTION INTRAMUSCULAR; INTRAVENOUS
Status: DISCONTINUED | OUTPATIENT
Start: 2025-01-07 | End: 2025-01-07 | Stop reason: HOSPADM

## 2025-01-07 RX ORDER — SODIUM CHLORIDE 9 MG/ML
INJECTION, SOLUTION INTRAVENOUS CONTINUOUS
Status: DISCONTINUED | OUTPATIENT
Start: 2025-01-07 | End: 2025-01-07 | Stop reason: HOSPADM

## 2025-01-07 RX ORDER — LIDOCAIN/PHENYLEPH/BSS NO.2/PF 1 %-1.5 %
SYRINGE (ML) INTRAOCULAR
Status: DISCONTINUED | OUTPATIENT
Start: 2025-01-07 | End: 2025-01-07 | Stop reason: HOSPADM

## 2025-01-07 RX ORDER — PREDNISOLONE ACETATE 10 MG/ML
SUSPENSION/ DROPS OPHTHALMIC
Status: DISCONTINUED | OUTPATIENT
Start: 2025-01-07 | End: 2025-01-07 | Stop reason: HOSPADM

## 2025-01-07 RX ORDER — MIDAZOLAM HYDROCHLORIDE 1 MG/ML
1 INJECTION, SOLUTION INTRAMUSCULAR; INTRAVENOUS
Status: DISCONTINUED | OUTPATIENT
Start: 2025-01-07 | End: 2025-01-07 | Stop reason: HOSPADM

## 2025-01-07 RX ORDER — ACETAMINOPHEN 325 MG/1
650 TABLET ORAL EVERY 4 HOURS PRN
Status: DISCONTINUED | OUTPATIENT
Start: 2025-01-07 | End: 2025-01-07 | Stop reason: HOSPADM

## 2025-01-07 RX ORDER — CYCLOP/TROP/PROPA/PHEN/KET/WAT 1-1-0.1%
1 DROPS (EA) OPHTHALMIC (EYE)
Status: COMPLETED | OUTPATIENT
Start: 2025-01-07 | End: 2025-01-07

## 2025-01-07 RX ORDER — EPINEPHRINE 1 MG/ML
INJECTION, SOLUTION, CONCENTRATE INTRAVENOUS
Status: DISCONTINUED | OUTPATIENT
Start: 2025-01-07 | End: 2025-01-07 | Stop reason: HOSPADM

## 2025-01-07 RX ORDER — TETRACAINE HYDROCHLORIDE 5 MG/ML
SOLUTION OPHTHALMIC
Status: DISCONTINUED | OUTPATIENT
Start: 2025-01-07 | End: 2025-01-07 | Stop reason: HOSPADM

## 2025-01-07 RX ADMIN — MOXIFLOXACIN OPHTHALMIC 1 DROP: 5 SOLUTION/ DROPS OPHTHALMIC at 10:01

## 2025-01-07 RX ADMIN — FENTANYL CITRATE 25 MCG: 50 INJECTION, SOLUTION INTRAMUSCULAR; INTRAVENOUS at 11:01

## 2025-01-07 RX ADMIN — Medication 1 DROP: at 10:01

## 2025-01-07 RX ADMIN — MIDAZOLAM HYDROCHLORIDE 1 MG: 1 INJECTION, SOLUTION INTRAMUSCULAR; INTRAVENOUS at 11:01

## 2025-01-07 NOTE — OP NOTE
Operative Date:  01/07/2025    Discharge Date:  01/07/2025    Discharge Patient Home    Report Title: Operative Note      SURGEON: Gerry Ford MD     ASSISTANT:     PREOPERATIVE DIAGNOSIS: Visually significant NSC cataract,  Left Eye.    POSTOPERATIVE DIAGNOSIS: Visually-significant NSC cataract,  Left Eye.    PROCEDURE PERFORMED: Phacoemulsification of the cataract with posterior chamber intraocular lens Left Eye.    ANESTHESIA:  Moderate Sedation with Local    The team confirmed the patient ID and re-evaluated the patient and sedation plan confirming it is suitable for the patient's condition and procedure prior to administering sedation.    COMPLICATIONS: None    ESTIMATED BLOOD LOSS: Minimal    INDICATIONS FOR PROCEDURE:   The patient is a pleasant 74 year old woman with increasing difficulties with activities of daily living secondary to a dense visually significant cataract in the Left Eye.  Discussions have been carried out with this patient concerning the options to surgery, risks, benefits and expectations.  The patient voiced good understanding and wished to proceed with the above procedure.    PROCEDURE IN DETAIL: The patient was brought to the operating room and received topical anesthetic to the eye and then was prepped and draped in the usual sterile fashion.  Using the Yi ring and the guarded khoa blade set at 0.37 mm, a partial thickness clear cornea incision was made temporally.  The paracentesis site was made at the six o'clock position.  Omidria was injected into the anterior chamber through the paracentesis.  Viscoat was then injected into the anterior chamber.  The eye was then reentered at the primary surgical site with a 2.4 mm keratome followed by continuous capsulotomy, hydrodissection, hydrodelineation and phacoemulsification of the cataract.  Residual cortical material was removed using automated irrigation-aspiration technique.  Healon was injected into the posterior  chamber and a CNAOTO 23.5 diopter lens was placed in the bag without difficulty. Residual viscoelastic was removed using automated irrigation-aspiration technique. The eye was re-pressurized using BSS solution and both the paracentesis site and the primary surgical site were demonstrated to be watertight at the end of the case with Weck--Chelsy manipulation.  One drop of Ofloxacin and one drop of Pred acetate 1% was applied to the Left Eye .The eye was closed, patched and a Garcias shield placed.  The patient was taken to the recovery room in good and stable condition.  The patient tolerated the procedure well.  The patient was instructed to refrain from any heavy lifting, bending, stooping or straining activities, discharged home  and to follow-up in the morning for routine postoperative care with Gerry Ford MD.

## 2025-01-07 NOTE — DISCHARGE INSTRUCTIONS
Dr. Ford   Post op Cataract instructions   Relax at home do not exert yourself for the rest of the day.   Resume taking all your medications after surgery.   Do NOT rub your eye   Do not allow water into the eye and do not wear eye make up for one week after surgery.   Wear eye shield at bedtime for 1 week.   You may take an over-the-counter pain medication such as Tylenol or Advil as needed for pain.   Mild discomfort is normal. However, if you have severe throbbing pain, loss of vision, onset of flashes and/or floaters call Dr. Ford at   [phone] or proceed to the Emergency room   You may resume driving the day after your procedure to your postop appointment if your vision is clear. Do not wear your eye shield while driving.   Plan to see Dr. Ford tomorrow at his office :   [address] [place] [address].   [phone]   Day of surgery:   Start your eye drops in the operative eye as soon as you get home and continue until Dr. Ford instructs you otherwise. (remove eye shield to instill drops)   Ofloxacin (tan top) 1 drop in operative eye 3 times a day   Durezol (pink top) 1 drop in operative eye 3 times a day.   Ilevro (gray top) 1 drop in operative eye once a day

## 2025-01-07 NOTE — BRIEF OP NOTE
[facility]  Brief Operative Note    Surgery Date: 1/7/2025     Surgeons and Role:     * Gerry Ford MD - Primary    Assisting Surgeon: None    Pre-op Diagnosis:  NS (nuclear sclerosis), left [H25.12]    Post-op Diagnosis:  Post-Op Diagnosis Codes:     * NS (nuclear sclerosis), left [H25.12]    Procedure(s) (LRB):  EXTRACTION, CATARACT, WITH IOL INSERTION (Left)    Anesthesia: RN IV Sedation    Operative Findings:     Estimated Blood Loss: * No values recorded between 1/7/2025 11:40 AM and 1/7/2025 12:03 PM *         Specimens:   Specimen (24h ago, onward)      None              Discharge Note    OUTCOME: Patient tolerated treatment/procedure well without complication and is now ready for discharge.    DISPOSITION: Home or Self Care    FINAL DIAGNOSIS:  Nuclear sclerotic cataract of left eye    FOLLOWUP: In clinic    DISCHARGE INSTRUCTIONS:    Discharge Procedure Orders   Other restrictions (specify):   Order Comments: No heavy lifting or bending for 1 week.

## 2025-01-07 NOTE — PLAN OF CARE
Marshall Almanza  has met all discharge criteria from Phase II. Vital Signs are stable, ambulating  without difficulty. Discharge instructions given, patient verbalized understanding. Discharged from facility via wheelchair in stable condition.

## 2025-01-08 ENCOUNTER — OFFICE VISIT (OUTPATIENT)
Dept: OPHTHALMOLOGY | Facility: CLINIC | Age: 75
End: 2025-01-08
Payer: MEDICARE

## 2025-01-08 DIAGNOSIS — Z98.890 POST-OPERATIVE STATE: Primary | ICD-10-CM

## 2025-01-08 PROCEDURE — 1126F AMNT PAIN NOTED NONE PRSNT: CPT | Mod: CPTII,S$GLB,, | Performed by: OPHTHALMOLOGY

## 2025-01-08 PROCEDURE — 3288F FALL RISK ASSESSMENT DOCD: CPT | Mod: CPTII,S$GLB,, | Performed by: OPHTHALMOLOGY

## 2025-01-08 PROCEDURE — 99024 POSTOP FOLLOW-UP VISIT: CPT | Mod: S$GLB,,, | Performed by: OPHTHALMOLOGY

## 2025-01-08 PROCEDURE — 1101F PT FALLS ASSESS-DOCD LE1/YR: CPT | Mod: CPTII,S$GLB,, | Performed by: OPHTHALMOLOGY

## 2025-01-08 PROCEDURE — 99999 PR PBB SHADOW E&M-EST. PATIENT-LVL II: CPT | Mod: PBBFAC,,, | Performed by: OPHTHALMOLOGY

## 2025-01-08 PROCEDURE — 1160F RVW MEDS BY RX/DR IN RCRD: CPT | Mod: CPTII,S$GLB,, | Performed by: OPHTHALMOLOGY

## 2025-01-08 PROCEDURE — 1159F MED LIST DOCD IN RCRD: CPT | Mod: CPTII,S$GLB,, | Performed by: OPHTHALMOLOGY

## 2025-01-08 NOTE — PROGRESS NOTES
Subjective:       Patient ID: Marshall Almanza is a 74 y.o. female.    Chief Complaint: Post-op Evaluation    HPI    Patient is here today for 1 day phaco OS. OS is doing well since surgery   and vision looks good. No pain, but occasional tearing in OS.     PMB TID OS  Last edited by Pebbles Taylor on 1/8/2025  8:13 AM.             Assessment:       1. Post-operative state        Plan:       S/p CE OU- Doing well.         CPM OS.  RTC 1 wk.

## 2025-01-14 DIAGNOSIS — K21.9 GASTROESOPHAGEAL REFLUX DISEASE, UNSPECIFIED WHETHER ESOPHAGITIS PRESENT: Primary | ICD-10-CM

## 2025-01-14 NOTE — TELEPHONE ENCOUNTER
No care due was identified.  [facility] Embedded Care Due Messages. Reference number: [id number].   1/14/2025 1:16:58 PM CST

## 2025-01-15 ENCOUNTER — OFFICE VISIT (OUTPATIENT)
Dept: OPHTHALMOLOGY | Facility: CLINIC | Age: 75
End: 2025-01-15
Payer: MEDICARE

## 2025-01-15 DIAGNOSIS — Z98.890 POST-OPERATIVE STATE: Primary | ICD-10-CM

## 2025-01-15 PROCEDURE — 1126F AMNT PAIN NOTED NONE PRSNT: CPT | Mod: CPTII,S$GLB,, | Performed by: OPHTHALMOLOGY

## 2025-01-15 PROCEDURE — 1101F PT FALLS ASSESS-DOCD LE1/YR: CPT | Mod: CPTII,S$GLB,, | Performed by: OPHTHALMOLOGY

## 2025-01-15 PROCEDURE — 99999 PR PBB SHADOW E&M-EST. PATIENT-LVL II: CPT | Mod: PBBFAC,,, | Performed by: OPHTHALMOLOGY

## 2025-01-15 PROCEDURE — 1159F MED LIST DOCD IN RCRD: CPT | Mod: CPTII,S$GLB,, | Performed by: OPHTHALMOLOGY

## 2025-01-15 PROCEDURE — 1160F RVW MEDS BY RX/DR IN RCRD: CPT | Mod: CPTII,S$GLB,, | Performed by: OPHTHALMOLOGY

## 2025-01-15 PROCEDURE — 99024 POSTOP FOLLOW-UP VISIT: CPT | Mod: S$GLB,,, | Performed by: OPHTHALMOLOGY

## 2025-01-15 PROCEDURE — 3288F FALL RISK ASSESSMENT DOCD: CPT | Mod: CPTII,S$GLB,, | Performed by: OPHTHALMOLOGY

## 2025-01-15 RX ORDER — ESOMEPRAZOLE MAGNESIUM 40 MG/1
40 CAPSULE, DELAYED RELEASE ORAL DAILY
Qty: 90 CAPSULE | Refills: 3 | Status: SHIPPED | OUTPATIENT
Start: 2025-01-15

## 2025-01-15 NOTE — PROGRESS NOTES
Subjective:       Patient ID: Marshall Almanza is a 74 y.o. female.    Chief Complaint: Post-op Evaluation (Patient Marshall Almanza is a 74 year old female./S/p Phaco w/IOL OD: 11/19/2024/S/p Phaco w/IOL OS: 01/07/2025)    HPI     Post-op Evaluation     Additional comments: Patient Mrashall Almanza is a 74 year old female.  S/p Phaco w/IOL OD: 11/19/2024  S/p Phaco w/IOL OS: 01/07/2025           Comments    Pt here for 1 week PCIOL OS (2nd eye) post-op visit. Pt states that [facility] IOU   is good but has occasional FBS. Pt denies any eye pain.    DLS: 01/08/2025 with Dr. Ford    Meds: PMB TID OS (until 02/04/2025)          Last edited by Gerry Ford MD on 1/15/2025  8:24 AM.             Assessment:       1. Post-operative state        Plan:       S/p CE OU- Doing well.      CPM OS.  AT's.  RTC 3 wks.

## 2025-02-05 ENCOUNTER — OFFICE VISIT (OUTPATIENT)
Dept: OPHTHALMOLOGY | Facility: CLINIC | Age: 75
End: 2025-02-05
Payer: MEDICARE

## 2025-02-05 DIAGNOSIS — H52.7 REFRACTIVE ERROR: ICD-10-CM

## 2025-02-05 DIAGNOSIS — Z98.890 POST-OPERATIVE STATE: Primary | ICD-10-CM

## 2025-02-05 PROCEDURE — 99999 PR PBB SHADOW E&M-EST. PATIENT-LVL II: CPT | Mod: PBBFAC,,, | Performed by: OPHTHALMOLOGY

## 2025-02-05 PROCEDURE — 99024 POSTOP FOLLOW-UP VISIT: CPT | Mod: S$GLB,,, | Performed by: OPHTHALMOLOGY

## 2025-02-05 PROCEDURE — 1101F PT FALLS ASSESS-DOCD LE1/YR: CPT | Mod: CPTII,S$GLB,, | Performed by: OPHTHALMOLOGY

## 2025-02-05 PROCEDURE — 1159F MED LIST DOCD IN RCRD: CPT | Mod: CPTII,S$GLB,, | Performed by: OPHTHALMOLOGY

## 2025-02-05 PROCEDURE — 1126F AMNT PAIN NOTED NONE PRSNT: CPT | Mod: CPTII,S$GLB,, | Performed by: OPHTHALMOLOGY

## 2025-02-05 PROCEDURE — 1160F RVW MEDS BY RX/DR IN RCRD: CPT | Mod: CPTII,S$GLB,, | Performed by: OPHTHALMOLOGY

## 2025-02-05 PROCEDURE — 3288F FALL RISK ASSESSMENT DOCD: CPT | Mod: CPTII,S$GLB,, | Performed by: OPHTHALMOLOGY

## 2025-02-05 NOTE — PROGRESS NOTES
Subjective:       Patient ID: Marshall Almanza is a 74 y.o. female.    Chief Complaint: Post-op Evaluation (Patient Marshall Almanza is a 74 year old female./S/p Phaco w/IOL OD: 11/19/2024/S/p Phaco w/IOL OS: 01/07/2025/)    HPI     Post-op Evaluation     Additional comments: Patient Marshall Almanza is a 74 year old female.  S/p Phaco w/IOL OD: 11/19/2024  S/p Phaco w/IOL OS: 01/07/2025             Comments    Pt here for 3 week PCIOL OU post-op visit. Pt states continued   intermittent FBS OD, IVIZIA provides minimal relief. Pt denies any eye   pain.    DLS: 01/15/2025 with Dr. Ford  PD: 62.0 mm    Meds:  1. PMB TID OS (used this A.M.)  2. IVIZIA BID OU          Last edited by Sandra Huddleston on 2/5/2025 10:05 AM.             Assessment:       1. Post-operative state    2. Refractive error        Plan:       S/p CE OU- Doing well.  RE-Pt wants MRx for distance.        Give MRx for distance.  Readers.  RTC Dr Davis in 6 mos.

## 2025-02-22 DIAGNOSIS — Z00.00 ENCOUNTER FOR MEDICARE ANNUAL WELLNESS EXAM: ICD-10-CM

## 2025-03-28 ENCOUNTER — TELEPHONE (OUTPATIENT)
Dept: OPHTHALMOLOGY | Facility: CLINIC | Age: 75
End: 2025-03-28
Payer: MEDICARE

## 2025-03-28 NOTE — TELEPHONE ENCOUNTER
Spoke to pt and she says she always has tears and scratchy feeling OU. States she is only using gtts BID and sometimes forgets. Denies flashes, floaters or diplopia. I told pt I will send message to [facility] staff for 6 mth f/u

## 2025-03-28 NOTE — TELEPHONE ENCOUNTER
----- Message from Sofía Storey sent at 3/27/2025  4:49 PM CDT -----    ----- Message -----  From: Jen Garcia  Sent: 3/27/2025  10:45 AM CDT  To: [facility] Staff    Type:  Sooner Apoointment RequestCaller is requesting a sooner appointment.  Name of Caller:Mikhail is the first available appointment?soon Symptoms:Both eyes feel like she has something in them Would the patient rather a call back or a response via [facility]? [facility] Call Back Number: [phone] Information:

## 2025-04-09 ENCOUNTER — OFFICE VISIT (OUTPATIENT)
Dept: OPTOMETRY | Facility: CLINIC | Age: 75
End: 2025-04-09
Payer: MEDICARE

## 2025-04-09 DIAGNOSIS — H04.123 DRY EYE SYNDROME OF BILATERAL LACRIMAL GLANDS: Primary | ICD-10-CM

## 2025-04-09 PROCEDURE — 99999 PR PBB SHADOW E&M-EST. PATIENT-LVL II: CPT | Mod: PBBFAC,,, | Performed by: OPTOMETRIST

## 2025-04-09 PROCEDURE — 3288F FALL RISK ASSESSMENT DOCD: CPT | Mod: CPTII,S$GLB,, | Performed by: OPTOMETRIST

## 2025-04-09 PROCEDURE — 1159F MED LIST DOCD IN RCRD: CPT | Mod: CPTII,S$GLB,, | Performed by: OPTOMETRIST

## 2025-04-09 PROCEDURE — G2211 COMPLEX E/M VISIT ADD ON: HCPCS | Mod: S$GLB,,, | Performed by: OPTOMETRIST

## 2025-04-09 PROCEDURE — 99213 OFFICE O/P EST LOW 20 MIN: CPT | Mod: S$GLB,,, | Performed by: OPTOMETRIST

## 2025-04-09 PROCEDURE — 1126F AMNT PAIN NOTED NONE PRSNT: CPT | Mod: CPTII,S$GLB,, | Performed by: OPTOMETRIST

## 2025-04-09 PROCEDURE — 1101F PT FALLS ASSESS-DOCD LE1/YR: CPT | Mod: CPTII,S$GLB,, | Performed by: OPTOMETRIST

## 2025-04-09 NOTE — PROGRESS NOTES
HPI    Patient is here today due to eye irritation. States that ever since having   cataract sx, she has felt as though she has been having something stuck in   her eyes daily. She sometimes feels that her eye lids are also heavy. Dr. Ford recommended iVizia and she has been using it for several months   with no results. Also reports increased photophobia and tearing. Denies   pain.   iVizia BID OU  Last edited by Pebbles Davis, OD on 4/9/2025  8:20 AM.            Assessment /Plan     For exam results, see Encounter Report.    Dry eye syndrome of bilateral lacrimal glands      Educated pt on dry eye flare up post CE OU. Pt to begin FML tid OU x 1 week, then bid OU x 1 week, then qd OU x 1 week (until follow up)  Pt OK to continue iVizia PRN    RTC x 3-4 weeks for dry eye recheck    Today's visit is associated with current and anticipated ongoing medical care related to this patient's single serious/complex condition (REBECCA). Follow up is to be continued indefinitely to monitor the condition.

## 2025-05-01 ENCOUNTER — OFFICE VISIT (OUTPATIENT)
Dept: OPTOMETRY | Facility: CLINIC | Age: 75
End: 2025-05-01
Payer: MEDICARE

## 2025-05-01 DIAGNOSIS — H04.123 DRY EYE SYNDROME OF BILATERAL LACRIMAL GLANDS: Primary | ICD-10-CM

## 2025-05-01 PROCEDURE — 1159F MED LIST DOCD IN RCRD: CPT | Mod: CPTII,S$GLB,, | Performed by: OPTOMETRIST

## 2025-05-01 PROCEDURE — 99999 PR PBB SHADOW E&M-EST. PATIENT-LVL II: CPT | Mod: PBBFAC,,, | Performed by: OPTOMETRIST

## 2025-05-01 PROCEDURE — 3288F FALL RISK ASSESSMENT DOCD: CPT | Mod: CPTII,S$GLB,, | Performed by: OPTOMETRIST

## 2025-05-01 PROCEDURE — 99213 OFFICE O/P EST LOW 20 MIN: CPT | Mod: S$GLB,,, | Performed by: OPTOMETRIST

## 2025-05-01 PROCEDURE — 1101F PT FALLS ASSESS-DOCD LE1/YR: CPT | Mod: CPTII,S$GLB,, | Performed by: OPTOMETRIST

## 2025-05-01 PROCEDURE — 1126F AMNT PAIN NOTED NONE PRSNT: CPT | Mod: CPTII,S$GLB,, | Performed by: OPTOMETRIST

## 2025-05-01 NOTE — PROGRESS NOTES
HPI    Patient is here today for dry eye recheck. Denies pain. States that the   dryness has been feeling better since using FML. States that today is her   last day using them.   Last edited by Tahmina Ramirez on 5/1/2025 10:46 AM.            Assessment /Plan     For exam results, see Encounter Report.    Dry eye syndrome of bilateral lacrimal glands      Educated pt on today's improved findings OU. Pt to D/C FML but OK to use sparingly qd PRN for future flare ups.     RTC in 1 year for annual eye exam unless changes noted sooner.

## 2025-05-22 ENCOUNTER — TELEPHONE (OUTPATIENT)
Dept: PRIMARY CARE CLINIC | Facility: CLINIC | Age: 75
End: 2025-05-22

## 2025-05-22 ENCOUNTER — OFFICE VISIT (OUTPATIENT)
Dept: PRIMARY CARE CLINIC | Facility: CLINIC | Age: 75
End: 2025-05-22
Payer: MEDICARE

## 2025-05-22 VITALS
SYSTOLIC BLOOD PRESSURE: 120 MMHG | HEART RATE: 85 BPM | OXYGEN SATURATION: 100 % | DIASTOLIC BLOOD PRESSURE: 60 MMHG | RESPIRATION RATE: 18 BRPM | HEIGHT: 64 IN | TEMPERATURE: 98 F | WEIGHT: 94.56 LBS | BODY MASS INDEX: 16.14 KG/M2

## 2025-05-22 DIAGNOSIS — J02.9 ACUTE PHARYNGITIS, UNSPECIFIED ETIOLOGY: Primary | ICD-10-CM

## 2025-05-22 DIAGNOSIS — Z00.00 ANNUAL PHYSICAL EXAM: Primary | ICD-10-CM

## 2025-05-22 LAB
CTP QC/QA: YES
CTP QC/QA: YES
MOLECULAR STREP A: NEGATIVE
SARS-COV-2 RDRP RESP QL NAA+PROBE: NEGATIVE

## 2025-05-22 PROCEDURE — 99999 PR PBB SHADOW E&M-EST. PATIENT-LVL IV: CPT | Mod: PBBFAC,,, | Performed by: NURSE PRACTITIONER

## 2025-05-22 NOTE — TELEPHONE ENCOUNTER
JUSTUS For Pt to Call the office requesting fasting labs before her Annual 6/2/25  Also Will to be seen 1 Wk F/u With Ana

## 2025-05-22 NOTE — PROGRESS NOTES
[facility]      Assessment     1. Acute pharyngitis, unspecified etiology    2. Body mass index (BMI) less than 16.5         Plan     Marshall Almanza is a pleasant 75 y.o.female. The following was addressed today.    Acute pharyngitis, unspecified etiology  -     POCT Strep A, Molecular  -     POCT COVID-19 Rapid Screening  Office Visit on 05/22/2025   Component Date Value Ref Range Status    Molecular Strep A, POC 05/22/2025 Negative  Negative Final     Acceptable 05/22/2025 Yes   Final    POC Rapid COVID 05/22/2025 Negative  Negative Final     Acceptable 05/22/2025 Yes   Final     Discussed differences of viral vs bacterial illness and treatment option for each.  95% of upper respiratory illness are initially caused by viruses or allergies, which ARE NOT treated with antibiotics.  Expect gradual improvement of your symptoms over the next 7-10 days.  Recommend: Tylenol 325 mg - 500 mg acetaminophen (Tylenol) every 6 hours for pain or fever.   Warm saltwater gargles, warm tea with honey, or Chloraseptic throat spray.  Drink enough water to keep your urine pale yellow to clear.  Make a follow-up appointment If your symptoms persist past 7-10 days, worsen significantly, or you develop a fever.  Discussed: go to ED with difficulty swallowing, drooling, difficulty breathing, high fever.    Body mass index (BMI) less than 16.5  - weight stable/similar since 2016.      Assessment & Plan    IMPRESSION:  Considered possible throat infection given symptoms of sore throat, hoarseness, and mild pain on swallowing.  Evaluated for potential strep throat or COVID-19 infection.  Assessed for possible exacerbation of history of silent reflux, though current symptoms differ from previous episodes.  Considered fatigue and lack of sleep as potential contributing factors to condition.    PLAN SUMMARY:  Ordered strep test  Ordered COVID-19 test    ORDERS:  Ordered strep test and COVID-19 test.            Follow up if symptoms worsen or fail to improve.    Future Appointments   Date Time Provider Department Center   6/4/2025 10:40 AM Kasia Garcia MD [facility]           Subjective      Patient ID:  Marshall Almanza is a 75 y.o. female who is here today for: Sore Throat     History of Present Illness:    History of Present Illness    CHIEF COMPLAINT:  Marshall presents today for sore throat and hoarse voice.    HISTORY OF PRESENT ILLNESS:  She presents with sore throat, pain on swallowing, and throat itching causing urge to cough that started two days ago. She also reports hoarse voice. She denies fever, chills, ear pain, nasal congestion, breathing difficulty, and sinus problems. She reports feeling run down and experiencing poor sleep, which she attributes to recent travel to [place] for a family wedding.    MEDICAL HISTORY:  She has a history of silent reflux from 7-8 years ago, during which she experienced hoarse voice without sore throat. Started Nexium 2 days ago without improvement of symptoms.        Review of Systems:    Review of Systems   Constitutional:  Negative for chills, diaphoresis and fever.   HENT:  Positive for sore throat. Negative for congestion, ear discharge, ear pain and sinus pain.    Respiratory:  Negative for cough, shortness of breath and wheezing.    Gastrointestinal:  Negative for abdominal pain, diarrhea, nausea and vomiting.   Musculoskeletal:  Negative for myalgias.   Skin:  Negative for rash.   Neurological:  Negative for headaches.       Problem list:    Active Problem List with Overview Notes    Diagnosis Date Noted    Nuclear sclerotic cataract of left eye 11/19/2024    Fatty liver 01/24/2024    Liver lesion 01/24/2024    Poor posture 09/13/2023    Decreased range of motion of neck 09/13/2023    Aortic atherosclerosis 03/06/2023    Serrated polyp of colon 11/13/2020    Protein-calorie malnutrition 10/07/2020    Change in bowel habits 09/14/2020     "Vaginal atrophy 06/03/2020    Abdominal pain 05/20/2020    Hydronephrosis 05/20/2020    Chronic pain of left thumb 06/08/2018    Weight loss, unintentional 10/19/2016    Age-related osteoporosis without current pathological fracture 07/07/2015    Multiple thyroid nodules 04/15/2015     Us 10/16 shows sub centimeter nodules -no change- recheck 2 years      Senile nuclear sclerosis - Both Eyes 10/01/2013    Laryngopharyngeal reflux (LPR) 07/18/2012    DJD (degenerative joint disease) 07/18/2012        Medications:    Current Outpatient Medications   Medication Instructions    dicyclomine (BENTYL) 20 mg tablet TAKE 1 TABLET BY MOUTH 3 TIMES A DAY AS NEEDED FOR ABDOMINAL PAIN    esomeprazole (NEXIUM) 40 mg, Oral, Daily    multivitamin (THERAGRAN) per tablet 1 tablet, Daily    prednisoLONE-moxiflox-bromfen 1-0.5-0.075 % DrpS 1 drop, Right Eye, 3 times daily         Allergies:    Review of patient's allergies indicates:   Allergen Reactions    Ciprofloxacin      palpitations    Flagyl [metronidazole hcl]      Cause urinary frequency         Objective     Vital Signs:      Vital Signs  Temp: 98.3 °F (36.8 °C)  Temp Source: Oral  Pulse: 85  Resp: 18  SpO2: 100 %  BP: 120/60  BP Location: Right arm  Patient Position: Sitting  Pain Score:   7  Pain Loc: Throat  Height and Weight  Height: 5' 4" (162.6 cm)  Weight: 42.9 kg (94 lb 9.2 oz)  BSA (Calculated - sq m): 1.39 sq meters  BMI (Calculated): 16.2  Weight in (lb) to have BMI = 25: 145.3]          Physical Exam:    Physical Exam  Vitals reviewed.   Constitutional:       General: She is not in acute distress.     Appearance: She is ill-appearing.   HENT:      Head: Normocephalic.      Right Ear: Tympanic membrane, ear canal and external ear normal. No tenderness.      Left Ear: Tympanic membrane, ear canal and external ear normal. No tenderness.      Nose: Nose normal. No congestion or rhinorrhea.      Right Sinus: No maxillary sinus tenderness or frontal sinus tenderness.    "   Left Sinus: No maxillary sinus tenderness or frontal sinus tenderness.      Mouth/Throat:      Lips: Pink.      Mouth: Mucous membranes are moist. No oral lesions.      Pharynx: Uvula midline. Posterior oropharyngeal erythema present. No oropharyngeal exudate or uvula swelling.      Tonsils: No tonsillar exudate or tonsillar abscesses. 0 on the right. 0 on the left.   Eyes:      Conjunctiva/sclera: Conjunctivae normal.      Pupils: Pupils are equal, round, and reactive to light.   Neck:      Trachea: No tracheal deviation.   Cardiovascular:      Rate and Rhythm: Normal rate and regular rhythm.      Heart sounds: Normal heart sounds.   Pulmonary:      Effort: Pulmonary effort is normal. No accessory muscle usage.      Breath sounds: Normal breath sounds.   Lymphadenopathy:      Head:      Right side of head: No submandibular or tonsillar adenopathy.      Left side of head: No submandibular or tonsillar adenopathy.      Cervical: No cervical adenopathy.   Skin:     General: Skin is warm.      Findings: No rash.   Neurological:      Mental Status: She is alert.   Psychiatric:         Mood and Affect: Mood normal.         Behavior: Behavior is cooperative.         Thought Content: Thought content normal.          This note was generated with the assistance of ambient listening technology. Verbal consent was obtained by the patient and accompanying visitor(s) for the recording of patient appointment to facilitate this note. I attest to having reviewed and edited the generated note for accuracy, though some syntax or spelling errors may persist. Please contact the author of this note for any clarification.

## 2025-05-22 NOTE — PATIENT INSTRUCTIONS
Orders Placed This Encounter    POCT Strep A, Molecular    POCT COVID-19 Rapid Screening         95% of upper respiratory illness are initially caused by viruses or allergies, which ARE NOT treated with antibiotics.  Expect gradual improvement of your symptoms over the next 7-10 days.  Tylenol 325 mg - 500 mg acetaminophen (Tylenol) every 6 hours for pain or fever.   Warm saltwater gargles, warm tea with honey, or Chloraseptic throat spray.  Drink enough water to keep your urine pale yellow to clear.  Make a follow-up appointment If your symptoms persist past 7-10 days, worsen significantly, or you develop a fever.

## 2025-05-22 NOTE — TELEPHONE ENCOUNTER
----- Message from Jeanne sent at 5/22/2025 10:58 AM CDT -----  Pt saw ALEKSANDR Brown today is wanting to do a f/u appt in 1 week w/Dr. Garcia. Also, pt is wanting orders put in to do labs before Annual visit w/Dr. Garcia on 6/4.Thanks

## 2025-06-02 ENCOUNTER — LAB VISIT (OUTPATIENT)
Dept: LAB | Facility: HOSPITAL | Age: 75
End: 2025-06-02
Attending: STUDENT IN AN ORGANIZED HEALTH CARE EDUCATION/TRAINING PROGRAM
Payer: MEDICARE

## 2025-06-02 DIAGNOSIS — Z00.00 ANNUAL PHYSICAL EXAM: ICD-10-CM

## 2025-06-02 LAB
ABSOLUTE EOSINOPHIL (OHS): 0.02 K/UL
ABSOLUTE MONOCYTE (OHS): 0.27 K/UL (ref 0.3–1)
ABSOLUTE NEUTROPHIL COUNT (OHS): 2.63 K/UL (ref 1.8–7.7)
ALBUMIN SERPL BCP-MCNC: 3.9 G/DL (ref 3.5–5.2)
ALP SERPL-CCNC: 57 UNIT/L (ref 40–150)
ALT SERPL W/O P-5'-P-CCNC: 18 UNIT/L (ref 10–44)
ANION GAP (OHS): 8 MMOL/L (ref 8–16)
AST SERPL-CCNC: 27 UNIT/L (ref 11–45)
BASOPHILS # BLD AUTO: 0.02 K/UL
BASOPHILS NFR BLD AUTO: 0.4 %
BILIRUB SERPL-MCNC: 0.7 MG/DL (ref 0.1–1)
BUN SERPL-MCNC: 15 MG/DL (ref 8–23)
CALCIUM SERPL-MCNC: 9.2 MG/DL (ref 8.7–10.5)
CHLORIDE SERPL-SCNC: 103 MMOL/L (ref 95–110)
CHOLEST SERPL-MCNC: 238 MG/DL (ref 120–199)
CHOLEST/HDLC SERPL: 2.7 {RATIO} (ref 2–5)
CO2 SERPL-SCNC: 28 MMOL/L (ref 23–29)
CREAT SERPL-MCNC: 0.8 MG/DL (ref 0.5–1.4)
EAG (OHS): 114 MG/DL (ref 68–131)
ERYTHROCYTE [DISTWIDTH] IN BLOOD BY AUTOMATED COUNT: 13.4 % (ref 11.5–14.5)
GFR SERPLBLD CREATININE-BSD FMLA CKD-EPI: >60 ML/MIN/1.73/M2
GLUCOSE SERPL-MCNC: 90 MG/DL (ref 70–110)
HBA1C MFR BLD: 5.6 % (ref 4–5.6)
HCT VFR BLD AUTO: 43.2 % (ref 37–48.5)
HDLC SERPL-MCNC: 88 MG/DL (ref 40–75)
HDLC SERPL: 37 % (ref 20–50)
HGB BLD-MCNC: 13.5 GM/DL (ref 12–16)
IMM GRANULOCYTES # BLD AUTO: 0.01 K/UL (ref 0–0.04)
IMM GRANULOCYTES NFR BLD AUTO: 0.2 % (ref 0–0.5)
LDLC SERPL CALC-MCNC: 126.6 MG/DL (ref 63–159)
LYMPHOCYTES # BLD AUTO: 1.63 K/UL (ref 1–4.8)
MCH RBC QN AUTO: 29.9 PG (ref 27–31)
MCHC RBC AUTO-ENTMCNC: 31.3 G/DL (ref 32–36)
MCV RBC AUTO: 96 FL (ref 82–98)
NONHDLC SERPL-MCNC: 150 MG/DL
NUCLEATED RBC (/100WBC) (OHS): 0 /100 WBC
PLATELET # BLD AUTO: 260 K/UL (ref 150–450)
PMV BLD AUTO: 10.2 FL (ref 9.2–12.9)
POTASSIUM SERPL-SCNC: 4.5 MMOL/L (ref 3.5–5.1)
PROT SERPL-MCNC: 6.7 GM/DL (ref 6–8.4)
RBC # BLD AUTO: 4.51 M/UL (ref 4–5.4)
RELATIVE EOSINOPHIL (OHS): 0.4 %
RELATIVE LYMPHOCYTE (OHS): 35.6 % (ref 18–48)
RELATIVE MONOCYTE (OHS): 5.9 % (ref 4–15)
RELATIVE NEUTROPHIL (OHS): 57.5 % (ref 38–73)
SODIUM SERPL-SCNC: 139 MMOL/L (ref 136–145)
TRIGL SERPL-MCNC: 117 MG/DL (ref 30–150)
TSH SERPL-ACNC: 2.37 UIU/ML (ref 0.4–4)
WBC # BLD AUTO: 4.58 K/UL (ref 3.9–12.7)

## 2025-06-02 PROCEDURE — 80061 LIPID PANEL: CPT

## 2025-06-02 PROCEDURE — 84443 ASSAY THYROID STIM HORMONE: CPT

## 2025-06-02 PROCEDURE — 36415 COLL VENOUS BLD VENIPUNCTURE: CPT

## 2025-06-02 PROCEDURE — 80053 COMPREHEN METABOLIC PANEL: CPT

## 2025-06-02 PROCEDURE — 85025 COMPLETE CBC W/AUTO DIFF WBC: CPT

## 2025-06-02 PROCEDURE — 83036 HEMOGLOBIN GLYCOSYLATED A1C: CPT

## 2025-06-03 ENCOUNTER — RESULTS FOLLOW-UP (OUTPATIENT)
Dept: PRIMARY CARE CLINIC | Facility: CLINIC | Age: 75
End: 2025-06-03

## 2025-06-04 ENCOUNTER — OFFICE VISIT (OUTPATIENT)
Dept: PRIMARY CARE CLINIC | Facility: CLINIC | Age: 75
End: 2025-06-04
Payer: MEDICARE

## 2025-06-04 VITALS
BODY MASS INDEX: 16.37 KG/M2 | WEIGHT: 95.88 LBS | HEART RATE: 77 BPM | DIASTOLIC BLOOD PRESSURE: 78 MMHG | SYSTOLIC BLOOD PRESSURE: 130 MMHG | OXYGEN SATURATION: 99 % | HEIGHT: 64 IN

## 2025-06-04 DIAGNOSIS — M54.2 NECK PAIN: ICD-10-CM

## 2025-06-04 DIAGNOSIS — B00.9 HSV-1 INFECTION: ICD-10-CM

## 2025-06-04 DIAGNOSIS — K58.8 OTHER IRRITABLE BOWEL SYNDROME: ICD-10-CM

## 2025-06-04 DIAGNOSIS — Z00.00 ANNUAL PHYSICAL EXAM: Primary | ICD-10-CM

## 2025-06-04 PROCEDURE — 99999 PR PBB SHADOW E&M-EST. PATIENT-LVL IV: CPT | Mod: PBBFAC,,, | Performed by: STUDENT IN AN ORGANIZED HEALTH CARE EDUCATION/TRAINING PROGRAM

## 2025-06-04 RX ORDER — ACYCLOVIR 50 MG/G
OINTMENT TOPICAL
Qty: 30 G | Refills: 2 | Status: SHIPPED | OUTPATIENT
Start: 2025-06-04

## 2025-06-04 NOTE — PATIENT INSTRUCTIONS
Start eating protein powder at least once a day.  Eat walnuts and almonds every day. Consider eating two eggs a day.  Keep eating the half of avocado a day.  If you can, eat peanut butter every day. You can put it on an apple. Try to increase your white meat intake.

## 2025-06-04 NOTE — PROGRESS NOTES
Office visit  Patient: Marshall Almanza   6/4/2025       Assessment/Plan:       1. Annual physical exam        -recent screening labs discussed with patient        -Discussed healthy habits and recommended preventative screening        -Discussed elevated ASCVD risk and potential statin therapy; patient would like to avoid medication at this time    2. Other irritable bowel syndrome       -stable, continue follow up with GI       -continue Bentyl    3. Neck pain       -continue treatment with OTC medications      -suspect muscle strain vs arthritis    4. HSV-1 infection  -     acyclovir 5% (ZOVIRAX) 5 % ointment; Apply topically 5 (five) times daily.  Dispense: 30 g; Refill: 2      Return to clinic in 6 months or sooner as needed.        CHIEF COMPLAINT: annual physical    HPI: Marshall Almanza is a 75 y.o. female who presents for an annual physical.     She reports that she still has pain in her neck - she uses cream to help the pain.    She also states that she has IBS - bloating and abdominal pain after eating.    She feels tired a lot - this has been going on for the past 2-3 years.  She's also slower at doing chores.    She states that she lost weight when she retired in 2018.  She reports she eats a half an avocado a day.  She occasionally eats almonds and walnuts, but not every day.  She does not want to eat much meat because she is worried that her cholesterol will increase.  She eats small portions of meat.  For example, she eats less than one chicken drumstick when she eats it.  She eats fish every week - salmon every other week.    The 10-year ASCVD risk score (Shelbie BRAVO, et al., 2019) is: 21.2%    Values used to calculate the score:      Age: 75 years      Sex: Female      Is Non- : No      Diabetic: No      Tobacco smoker: No      Systolic Blood Pressure: 130 mmHg      Is BP treated: No      HDL Cholesterol: 88 mg/dL      Total Cholesterol: 238 mg/dL        Current Outpatient Medications  "  Medication Instructions    acyclovir 5% (ZOVIRAX) 5 % ointment Topical (Top), 5 times daily    dicyclomine (BENTYL) 20 mg tablet TAKE 1 TABLET BY MOUTH 3 TIMES A DAY AS NEEDED FOR ABDOMINAL PAIN    esomeprazole (NEXIUM) 40 mg, Oral, Daily    multivitamin (THERAGRAN) per tablet 1 tablet, Daily    polyethylene glycol 400 (VISINE DRY EYE RELIEF OPHT) Apply to eye.    prednisoLONE-moxiflox-bromfen 1-0.5-0.075 % DrpS 1 drop, Right Eye, 3 times daily       Lab Results   Component Value Date    HGBA1C 5.6 06/02/2025    HGBA1C 5.6 11/17/2015    HGBA1C 5.7 07/12/2013     No results found for: "MICALBCREAT"  Lab Results   Component Value Date    LDLCALC 126.6 06/02/2025    LDLCALC 130.4 05/20/2024    CHOL 238 (H) 06/02/2025    HDL 88 (H) 06/02/2025    TRIG 117 06/02/2025       Lab Results   Component Value Date     06/02/2025    K 4.5 06/02/2025     06/02/2025    CO2 28 06/02/2025    GLU 90 06/02/2025    BUN 15 06/02/2025    CREATININE 0.8 06/02/2025    CALCIUM 9.2 06/02/2025    PROT 6.7 06/02/2025    ALBUMIN 3.9 06/02/2025    BILITOT 0.7 06/02/2025    ALKPHOS 57 06/02/2025    AST 27 06/02/2025    ALT 18 06/02/2025    ANIONGAP 8 06/02/2025    ESTGFRAFRICA >60.0 10/15/2021    EGFRNONAA >60.0 10/15/2021    WBC 4.58 06/02/2025    HGB 13.5 06/02/2025    HGB 13.9 05/20/2024    HCT 43.2 06/02/2025    MCV 96 06/02/2025     06/02/2025    TSH 2.371 06/02/2025    PSA <0.01 09/24/2014    HEPCAB Negative 11/18/2020       Lab Results   Component Value Date    BWOHKVLP54NX 61 12/10/2024    EYOSGLVS44EI 54 10/31/2023    XOBULASN65 1087 (H) 10/31/2023    FERRITIN 188 06/16/2016    IRON 103 06/16/2016    TRANSFERRIN 244 06/16/2016    TIBC 361 06/16/2016    FESATURATED 29 06/16/2016         Past Medical History:   Diagnosis Date    Arthritis     Multiple thyroid nodules     Osteoporosis     Reflux     Senile nuclear sclerosis - Both Eyes 10/1/2013     Past Surgical History:   Procedure Laterality Date    CATARACT " "EXTRACTION W/  INTRAOCULAR LENS IMPLANT Right 11/19/2024    Procedure: EXTRACTION, CATARACT, WITH IOL INSERTION;  Surgeon: Gerry Ford MD;  Location: [facility] OR;  Service: Ophthalmology;  Laterality: Right;    CATARACT EXTRACTION W/  INTRAOCULAR LENS IMPLANT Left 1/7/2025    Procedure: EXTRACTION, CATARACT, WITH IOL INSERTION;  Surgeon: Gerry Ford MD;  Location: [facility] OR;  Service: Ophthalmology;  Laterality: Left;    COLONOSCOPY N/A 9/14/2020    Procedure: COLONOSCOPY;  Surgeon: Herminio Franco MD;  Location: [facility] ENDO (4TH FLR);  Service: Endoscopy;  Laterality: N/A;  Patient would like colonoscopy 1st case of the day with me  Please recommend MiraLax 17 g in 12 oz of water 3 days in 2 days prior  With a split bowel prep.        COVID test at [facility] on 9/11-GT    COLONOSCOPY N/A 2/29/2024    Procedure: COLONOSCOPY;  Surgeon: Herminio Franco MD;  Location: [facility] (4TH FLR);  Service: Endoscopy;  Laterality: N/A;    ESOPHAGOGASTRODUODENOSCOPY N/A 2/29/2024    Procedure: EGD (ESOPHAGOGASTRODUODENOSCOPY);  Surgeon: Herminio Franco MD;  Location: [facility] ENDO (4TH FLR);  Service: Endoscopy;  Laterality: N/A;  Procedure: EGD/Colonoscopy   golytely  2/23-precall complete-MS  2/28-pt notified of earlier arrival time (0915am)-[facility]       Social History[1]    family history includes Cancer in her father; Cataracts in her mother.    Vitals:    06/04/25 1039   BP: 130/78   Pulse: 77   SpO2: 99%   Weight: 43.5 kg (95 lb 14.4 oz)   Height: 5' 4" (1.626 m)   PainSc: 0-No pain       Body mass index is 16.46 kg/m².      Objective:   Physical Exam  Vitals reviewed.   Constitutional:       General: She is not in acute distress.     Appearance: Normal appearance. She is well-developed.      Comments: cachectic   HENT:      Head: Normocephalic and atraumatic.      Right Ear: External ear normal.      Left Ear: External ear normal.      Nose: Nose normal.      Mouth/Throat:      Mouth: Mucous membranes " are moist.   Eyes:      General: No scleral icterus.        Right eye: No discharge.         Left eye: No discharge.      Conjunctiva/sclera: Conjunctivae normal.   Cardiovascular:      Rate and Rhythm: Normal rate and regular rhythm.      Heart sounds: Normal heart sounds. No murmur heard.     No friction rub. No gallop.   Pulmonary:      Effort: Pulmonary effort is normal. No respiratory distress.      Breath sounds: Normal breath sounds. No wheezing, rhonchi or rales.   Abdominal:      General: Abdomen is flat. Bowel sounds are normal. There is no distension.      Palpations: Abdomen is soft. There is no mass.      Tenderness: There is no abdominal tenderness. There is no guarding or rebound.      Hernia: No hernia is present.   Musculoskeletal:         General: No deformity.      Right lower leg: No edema.      Left lower leg: No edema.   Skin:     General: Skin is warm and dry.   Neurological:      General: No focal deficit present.      Mental Status: She is alert and oriented to person, place, and time.   Psychiatric:         Mood and Affect: Mood normal.         Behavior: Behavior normal.         Thought Content: Thought content normal.           Kasia Garcia MD  Internal Medicine and Pediatrics                                 [1]   Social History  Tobacco Use    Smoking status: Never    Smokeless tobacco: Never   Substance Use Topics    Alcohol use: No    Drug use: No

## (undated) DEVICE — BLADE SURG BVL ANG COAX 2.4MM

## (undated) DEVICE — SYR LUER LOCK 1CC

## (undated) DEVICE — GLOVE SENSICARE PI MICRO 7.5

## (undated) DEVICE — SOL IRR STRL WATER 500ML

## (undated) DEVICE — SOL BETADINE 5%

## (undated) DEVICE — Device

## (undated) DEVICE — DUOVISC

## (undated) DEVICE — SHEILD & GARTERS FOX METAL EYE

## (undated) DEVICE — SYR SLIP TIP 1CC